# Patient Record
Sex: FEMALE | Race: WHITE | Employment: OTHER | URBAN - METROPOLITAN AREA
[De-identification: names, ages, dates, MRNs, and addresses within clinical notes are randomized per-mention and may not be internally consistent; named-entity substitution may affect disease eponyms.]

---

## 2017-11-08 PROBLEM — M25.511 CHRONIC RIGHT SHOULDER PAIN: Status: ACTIVE | Noted: 2017-11-08

## 2017-11-08 PROBLEM — R00.1 BRADYCARDIA: Status: ACTIVE | Noted: 2017-11-08

## 2017-11-08 PROBLEM — Z86.69 HISTORY OF MIGRAINE HEADACHES: Status: ACTIVE | Noted: 2017-11-08

## 2017-11-08 PROBLEM — Z12.11 COLON CANCER SCREENING: Status: ACTIVE | Noted: 2017-11-08

## 2017-11-08 PROBLEM — Z12.39 BREAST CANCER SCREENING, HIGH RISK PATIENT: Status: ACTIVE | Noted: 2017-11-08

## 2017-11-08 PROBLEM — E78.2 MIXED HYPERLIPIDEMIA: Status: ACTIVE | Noted: 2017-11-08

## 2017-11-08 PROBLEM — G89.29 CHRONIC RIGHT SHOULDER PAIN: Status: ACTIVE | Noted: 2017-11-08

## 2018-01-04 ENCOUNTER — HOSPITAL ENCOUNTER (OUTPATIENT)
Dept: MAMMOGRAPHY | Age: 63
Discharge: HOME | End: 2018-01-04
Attending: INTERNAL MEDICINE
Payer: COMMERCIAL

## 2018-01-04 DIAGNOSIS — R92.8 ABNORMAL MAMMOGRAM OF RIGHT BREAST: ICD-10-CM

## 2018-01-04 PROCEDURE — 77065 DX MAMMO INCL CAD UNI: CPT

## 2018-01-23 ENCOUNTER — HOSPITAL ENCOUNTER (OUTPATIENT)
Dept: LAB | Age: 63
Discharge: HOME | End: 2018-01-23

## 2018-01-23 PROCEDURE — 88305 TISSUE EXAM BY PATHOLOGIST: CPT | Performed by: INTERNAL MEDICINE

## 2018-01-23 PROCEDURE — 88312 SPECIAL STAINS GROUP 1: CPT | Performed by: INTERNAL MEDICINE

## 2018-03-21 PROBLEM — G43.009 MIGRAINE WITHOUT AURA OR STATUS MIGRAINOSUS: Status: ACTIVE | Noted: 2018-03-21

## 2018-03-21 PROBLEM — H61.21 IMPACTED CERUMEN OF RIGHT EAR: Status: ACTIVE | Noted: 2018-03-21

## 2018-03-21 PROBLEM — M25.50 ARTHRALGIA: Status: ACTIVE | Noted: 2018-03-21

## 2018-03-21 PROBLEM — R42 VERTIGO: Status: ACTIVE | Noted: 2018-03-21

## 2018-03-21 PROBLEM — M85.89 OSTEOPENIA OF MULTIPLE SITES: Status: ACTIVE | Noted: 2018-03-21

## 2018-03-21 PROBLEM — R51.9 HEADACHE: Status: ACTIVE | Noted: 2018-03-21

## 2018-06-04 ENCOUNTER — HOSPITAL ENCOUNTER (OUTPATIENT)
Dept: MAMMOGRAPHY | Age: 63
Discharge: HOME | End: 2018-06-04
Attending: INTERNAL MEDICINE
Payer: COMMERCIAL

## 2018-06-04 DIAGNOSIS — N63.10 MASS OF RIGHT BREAST: ICD-10-CM

## 2018-06-04 DIAGNOSIS — R92.8 ABNORMAL MAMMOGRAM: ICD-10-CM

## 2018-06-04 PROCEDURE — 77066 DX MAMMO INCL CAD BI: CPT

## 2018-06-04 PROCEDURE — 76642 ULTRASOUND BREAST LIMITED: CPT

## 2018-06-15 PROBLEM — F41.9 ANXIETY AND DEPRESSION: Status: ACTIVE | Noted: 2018-06-15

## 2018-06-15 PROBLEM — F32.A ANXIETY AND DEPRESSION: Status: ACTIVE | Noted: 2018-06-15

## 2018-09-25 PROBLEM — H91.92 DECREASED HEARING OF LEFT EAR: Status: ACTIVE | Noted: 2018-09-25

## 2018-09-25 PROBLEM — H61.22 IMPACTED CERUMEN OF LEFT EAR: Status: ACTIVE | Noted: 2018-09-25

## 2018-09-25 PROBLEM — R09.81 SINUS CONGESTION: Status: ACTIVE | Noted: 2018-09-25

## 2018-11-14 ENCOUNTER — HOSPITAL ENCOUNTER (OUTPATIENT)
Dept: CT IMAGING | Age: 63
Discharge: HOME | End: 2018-11-14
Payer: COMMERCIAL

## 2018-11-14 DIAGNOSIS — R10.84 ABDOMINAL PAIN, GENERALIZED: ICD-10-CM

## 2018-11-14 DIAGNOSIS — K21.9 ESOPHAGEAL REFLUX: ICD-10-CM

## 2018-11-14 DIAGNOSIS — K58.0 IRRITABLE BOWEL SYNDROME WITH DIARRHEA: ICD-10-CM

## 2018-11-14 LAB — CREAT BLD-MCNC: 0.6 MG/DL (ref 0.8–1.5)

## 2018-11-14 PROCEDURE — 74177 CT ABD & PELVIS W/CONTRAST: CPT

## 2018-11-14 PROCEDURE — 74011000258 HC RX REV CODE- 258

## 2018-11-14 PROCEDURE — 82565 ASSAY OF CREATININE: CPT

## 2018-11-14 PROCEDURE — 74011636320 HC RX REV CODE- 636/320

## 2018-11-14 RX ORDER — SODIUM CHLORIDE 0.9 % (FLUSH) 0.9 %
10 SYRINGE (ML) INJECTION
Status: COMPLETED | OUTPATIENT
Start: 2018-11-14 | End: 2018-11-14

## 2018-11-14 RX ADMIN — DIATRIZOATE MEGLUMINE AND DIATRIZOATE SODIUM 15 ML: 660; 100 LIQUID ORAL; RECTAL at 10:40

## 2018-11-14 RX ADMIN — IOPAMIDOL 100 ML: 755 INJECTION, SOLUTION INTRAVENOUS at 10:40

## 2018-11-14 RX ADMIN — Medication 10 ML: at 10:40

## 2018-11-14 RX ADMIN — SODIUM CHLORIDE 100 ML: 900 INJECTION, SOLUTION INTRAVENOUS at 10:40

## 2018-11-28 PROBLEM — G47.9 SLEEP DISTURBANCE: Status: ACTIVE | Noted: 2018-11-28

## 2019-03-22 PROBLEM — R32 URINARY INCONTINENCE: Status: ACTIVE | Noted: 2019-03-22

## 2019-08-02 ENCOUNTER — HOSPITAL ENCOUNTER (OUTPATIENT)
Dept: MAMMOGRAPHY | Age: 64
Discharge: HOME | End: 2019-08-02
Attending: INTERNAL MEDICINE
Payer: MEDICARE

## 2019-08-02 DIAGNOSIS — Z12.31 VISIT FOR SCREENING MAMMOGRAM: ICD-10-CM

## 2019-08-02 PROCEDURE — 77063 BREAST TOMOSYNTHESIS BI: CPT

## 2019-12-03 PROBLEM — Z00.00 WELCOME TO MEDICARE PREVENTIVE VISIT: Status: ACTIVE | Noted: 2019-12-03

## 2020-09-08 PROBLEM — R00.1 BRADYCARDIA: Status: RESOLVED | Noted: 2017-11-08 | Resolved: 2020-09-08

## 2020-09-08 PROBLEM — Z12.39 BREAST CANCER SCREENING, HIGH RISK PATIENT: Status: RESOLVED | Noted: 2017-11-08 | Resolved: 2020-09-08

## 2020-09-08 PROBLEM — G47.00 INSOMNIA: Status: ACTIVE | Noted: 2020-09-08

## 2020-09-08 PROBLEM — J30.1 SEASONAL ALLERGIC RHINITIS DUE TO POLLEN: Status: ACTIVE | Noted: 2018-09-25

## 2020-09-08 PROBLEM — H61.21 IMPACTED CERUMEN OF RIGHT EAR: Status: RESOLVED | Noted: 2018-03-21 | Resolved: 2020-09-08

## 2020-09-08 PROBLEM — Z00.00 WELCOME TO MEDICARE PREVENTIVE VISIT: Status: RESOLVED | Noted: 2019-12-03 | Resolved: 2020-09-08

## 2020-09-08 PROBLEM — Z86.69 HISTORY OF MIGRAINE HEADACHES: Status: RESOLVED | Noted: 2017-11-08 | Resolved: 2020-09-08

## 2020-09-08 PROBLEM — Z78.0 MENOPAUSE: Status: ACTIVE | Noted: 2020-09-08

## 2020-09-08 PROBLEM — M25.50 ARTHRALGIA: Status: RESOLVED | Noted: 2018-03-21 | Resolved: 2020-09-08

## 2020-09-08 PROBLEM — G47.9 SLEEP DISTURBANCE: Status: RESOLVED | Noted: 2018-11-28 | Resolved: 2020-09-08

## 2020-09-08 PROBLEM — H61.22 IMPACTED CERUMEN OF LEFT EAR: Status: RESOLVED | Noted: 2018-09-25 | Resolved: 2020-09-08

## 2020-09-08 PROBLEM — H91.92 DECREASED HEARING OF LEFT EAR: Status: RESOLVED | Noted: 2018-09-25 | Resolved: 2020-09-08

## 2020-09-08 PROBLEM — Z12.11 COLON CANCER SCREENING: Status: RESOLVED | Noted: 2017-11-08 | Resolved: 2020-09-08

## 2020-09-08 PROBLEM — R51.9 HEADACHE: Status: RESOLVED | Noted: 2018-03-21 | Resolved: 2020-09-08

## 2020-09-08 PROBLEM — I10 ESSENTIAL HYPERTENSION: Status: ACTIVE | Noted: 2020-09-08

## 2020-10-12 ENCOUNTER — HOSPITAL ENCOUNTER (OUTPATIENT)
Dept: MAMMOGRAPHY | Age: 65
Discharge: HOME | End: 2020-10-12
Attending: INTERNAL MEDICINE
Payer: MEDICARE

## 2020-10-12 DIAGNOSIS — N95.1 POSTMENOPAUSAL DISORDER: ICD-10-CM

## 2020-10-12 DIAGNOSIS — Z12.31 VISIT FOR SCREENING MAMMOGRAM: ICD-10-CM

## 2020-10-12 DIAGNOSIS — M85.89 OSTEOPENIA OF MULTIPLE SITES: ICD-10-CM

## 2020-10-12 PROCEDURE — 77063 BREAST TOMOSYNTHESIS BI: CPT

## 2020-10-12 PROCEDURE — 77080 DXA BONE DENSITY AXIAL: CPT

## 2020-10-13 NOTE — PROGRESS NOTES
This has been fully explained to the patient, who indicates understanding that per Ollie Chu noted mammo stable, recheck in 1 year

## 2020-10-13 NOTE — PROGRESS NOTES
This has been fully explained to the patient, who indicates understanding that per Dr. Sarabjit Aquino noted mammo stable, recheck in 1 year

## 2021-05-17 ENCOUNTER — HOSPITAL ENCOUNTER (OUTPATIENT)
Dept: CT IMAGING | Age: 66
Discharge: HOME | End: 2021-05-17
Attending: INTERNAL MEDICINE
Payer: MEDICARE

## 2021-05-17 DIAGNOSIS — G44.89 OTHER HEADACHE SYNDROME: ICD-10-CM

## 2021-05-17 DIAGNOSIS — R41.3 MEMORY CHANGES: ICD-10-CM

## 2021-05-17 PROCEDURE — 70450 CT HEAD/BRAIN W/O DYE: CPT

## 2021-05-20 NOTE — PROGRESS NOTES
LMOVM informing pt, per Dr. Tomás Zuleta, CT head negative for mass, hematoma, or acute abnormality.

## 2021-10-22 ENCOUNTER — HOSPITAL ENCOUNTER (OUTPATIENT)
Dept: GENERAL RADIOLOGY | Age: 66
Discharge: HOME | End: 2021-10-22
Payer: MEDICARE

## 2021-10-22 DIAGNOSIS — M54.31 SCIATICA, RIGHT SIDE: ICD-10-CM

## 2021-10-22 DIAGNOSIS — M25.551 ACUTE HIP PAIN, RIGHT: ICD-10-CM

## 2021-10-22 PROCEDURE — 73502 X-RAY EXAM HIP UNI 2-3 VIEWS: CPT

## 2021-10-22 PROCEDURE — 72100 X-RAY EXAM L-S SPINE 2/3 VWS: CPT

## 2021-10-22 NOTE — PROGRESS NOTES
Please let Troy Mercado know that her xrays are normal. If her symptoms persist please have her schedule a follow up in the office for further evaluation.

## 2021-10-22 NOTE — PROGRESS NOTES
Pt notified that her xrays are normal. If her symptoms persist please have her schedule a follow up in the office for further evaluation. Pt verbalized understanding.

## 2021-11-04 ENCOUNTER — HOSPITAL ENCOUNTER (OUTPATIENT)
Dept: CT IMAGING | Age: 66
Discharge: HOME | End: 2021-11-04
Attending: PHYSICIAN ASSISTANT
Payer: MEDICARE

## 2021-11-04 DIAGNOSIS — R10.30 LOWER ABDOMINAL PAIN: ICD-10-CM

## 2021-11-04 DIAGNOSIS — Z91.041 ALLERGY TO IODINATED CONTRAST MEDIA: ICD-10-CM

## 2021-11-04 PROCEDURE — 74176 CT ABD & PELVIS W/O CONTRAST: CPT

## 2021-11-04 PROCEDURE — 74011000250 HC RX REV CODE- 250: Performed by: PHYSICIAN ASSISTANT

## 2021-11-04 RX ORDER — BARIUM SULFATE 20 MG/ML
450 SUSPENSION ORAL
Status: COMPLETED | OUTPATIENT
Start: 2021-11-04 | End: 2021-11-04

## 2021-11-04 RX ADMIN — BARIUM SULFATE 450 ML: 20 SUSPENSION ORAL at 10:23

## 2021-11-04 NOTE — PROGRESS NOTES
Pt notified to temporarily discontinue calcium supplements while taking antibiotic to avoid drug interactions that can reduce efficacy of antibiotic. She may resume them once she is done with antibiotics. Pt verbalized understanding.

## 2021-11-04 NOTE — PROGRESS NOTES
Addended by: NIDIA OLMEDO on: 7/29/2019 03:40 PM     Modules accepted: Orders     Pt notified that her scan is suggestive of some inflammation in her colon. Saadia Duy would like her to take Cipro x 10 days and see if her symptoms resolve. Pt verbalized understanding and is scheduled for a follow-up.

## 2021-11-04 NOTE — PROGRESS NOTES
Her scan is suggestive of some inflammation in her colon. I would like her to take Cipro x 10 days and see if her symptoms resolve. I will send in the antibiotic. Please schedule her a follow-up appt in about 4 weeks.

## 2021-11-04 NOTE — PROGRESS NOTES
Prescription sent but please advise patient that she will need to temporarily discontinue calcium supplements while taking antibiotic to avoid drug interactions that can reduce efficacy of antibiotic. She may resume them once she is done with antibiotics.

## 2021-11-04 NOTE — PROGRESS NOTES
Correction - follow-up in 3 weeks. Also need to know which local pharmacy she wants antibiotic sent to. She has 2 in her chart.

## 2021-12-15 ENCOUNTER — TRANSCRIBE ORDER (OUTPATIENT)
Dept: SCHEDULING | Age: 66
End: 2021-12-15

## 2021-12-15 DIAGNOSIS — Z12.31 VISIT FOR SCREENING MAMMOGRAM: Primary | ICD-10-CM

## 2022-02-14 ENCOUNTER — HOSPITAL ENCOUNTER (OUTPATIENT)
Dept: MAMMOGRAPHY | Age: 67
Discharge: HOME | End: 2022-02-14
Attending: INTERNAL MEDICINE
Payer: MEDICARE

## 2022-02-14 DIAGNOSIS — Z12.31 VISIT FOR SCREENING MAMMOGRAM: ICD-10-CM

## 2022-02-14 PROCEDURE — 77063 BREAST TOMOSYNTHESIS BI: CPT

## 2022-03-18 PROBLEM — R32 URINARY INCONTINENCE: Status: ACTIVE | Noted: 2019-03-22

## 2022-03-18 PROBLEM — I10 ESSENTIAL HYPERTENSION: Status: ACTIVE | Noted: 2020-09-08

## 2022-03-18 PROBLEM — Z78.0 MENOPAUSE: Status: ACTIVE | Noted: 2020-09-08

## 2022-03-18 PROBLEM — G47.00 INSOMNIA: Status: ACTIVE | Noted: 2020-09-08

## 2022-03-19 PROBLEM — M25.511 CHRONIC RIGHT SHOULDER PAIN: Status: ACTIVE | Noted: 2017-11-08

## 2022-03-19 PROBLEM — F41.9 ANXIETY AND DEPRESSION: Status: ACTIVE | Noted: 2018-06-15

## 2022-03-19 PROBLEM — J30.1 SEASONAL ALLERGIC RHINITIS DUE TO POLLEN: Status: ACTIVE | Noted: 2018-09-25

## 2022-03-19 PROBLEM — R42 VERTIGO: Status: ACTIVE | Noted: 2018-03-21

## 2022-03-19 PROBLEM — G43.009 MIGRAINE WITHOUT AURA OR STATUS MIGRAINOSUS: Status: ACTIVE | Noted: 2018-03-21

## 2022-03-19 PROBLEM — M85.89 OSTEOPENIA OF MULTIPLE SITES: Status: ACTIVE | Noted: 2018-03-21

## 2022-03-19 PROBLEM — G89.29 CHRONIC RIGHT SHOULDER PAIN: Status: ACTIVE | Noted: 2017-11-08

## 2022-03-19 PROBLEM — F32.A ANXIETY AND DEPRESSION: Status: ACTIVE | Noted: 2018-06-15

## 2022-03-20 PROBLEM — E78.2 MIXED HYPERLIPIDEMIA: Status: ACTIVE | Noted: 2017-11-08

## 2022-03-31 ENCOUNTER — HOSPITAL ENCOUNTER (OUTPATIENT)
Dept: PHYSICAL THERAPY | Age: 67
Discharge: HOME | End: 2022-03-31
Payer: MEDICARE

## 2022-03-31 DIAGNOSIS — M54.41 ACUTE MIDLINE LOW BACK PAIN WITH RIGHT-SIDED SCIATICA: ICD-10-CM

## 2022-03-31 PROCEDURE — 97162 PT EVAL MOD COMPLEX 30 MIN: CPT

## 2022-03-31 NOTE — THERAPY EVALUATION
: 1955      Payor: Cindy Scruggs / Plan: [address] HMO / Product Type: Managed Care Medicare /  [id number] [facility] [address]. [facility] [address], [facility], [id number] [place] [address]  [phone]   [phone]       OUTPATIENT PHYSICAL THERAPY:Initial Assessment 3/31/2022      ICD-10: Treatment Diagnosis:   Low back pain (M54.5)  Muscle Weakness, Generalized (M62.81)                 Precautions/Allergies:   Adhesive, Codeine, Iodinated contrast media, and Other medication   Fall Risk Score: 0 (? 5 = High Risk)  MD Orders: Eval and Treat  MEDICAL/REFERRING DIAGNOSIS:  Acute midline low back pain with right-sided sciatica [M54.41]   DATE OF ONSET: one month  REFERRING PHYSICIAN: Edith Castillo NP  RETURN PHYSICIAN APPOINTMENT: TBD     INITIAL ASSESSMENT:  Ms. Yanet Goodwin presents to physical therapy with decreased postural and hip/core strength, ROM, joint mobility, flexibility, functional mobility, and increased pain. No pelvic malalignment upon initial evaluation but decreased posture. These S/S are consistent with referring diagnosis. Yanet Goodwin will benefit from skilled physical therapy (medically necessary) to address above deficits affecting participation in basic ADLs and functional mobility/tolerance. Patient will benefit from manual therapeutic techniques (stretching, joint mobilizations, soft tissue mobilization/myofascial release), therapeutic exercises and activities, postural strengthening/education, and comprehensive home exercises program to address current impairments and functional limitations. PROBLEM LIST (Impacting functional limitations):  1. Decreased Strength  2. Decreased ADL/Functional Activities  3. Decreased Transfer Abilities  4. Decreased Ambulation Ability/Technique  5. Decreased Balance  6. Increased Pain  7. Decreased Activity Tolerance  8. Decreased Flexibility/Joint Mobility  9.  Decreased [place] with Home Exercise Program INTERVENTIONS PLANNED:    1. Family Education  2. Home Exercise Program (HEP)  3. Manual Therapy  4. Range of Motion (ROM)  5. Therapeutic Activites  6. Therapeutic Exercise/Strengthening     TREATMENT PLAN:  Effective Dates: 3/31/2022 TO 5/30/2022 (60 days). Frequency/Duration: 2 times a week for 60 Days  GOALS: (Goals have been discussed and agreed upon with patient.)     Short-Term Goals~4-6 weeks  Goal Met   1. Ava Castellon will be independent with HEP 1.  [] Date:   2. Ava Castellon will participate in LE stretching program to increase flexibility    2. [] Date:   3. Ava Castellon will improve NEREYDA by 5 points. 3.  [] Date:   4. Ava Castellon will improve 30 sec Sit to Stand Test by 5 reps indicating increase in overall functional strength. 4.  [] Date:   5. Ava Castellon will be able to lift and carry 30 pounds without difficulty. 5.  [] Date:   6. Ava Castellon will return to [facility] for regular strength training. 6.  [] Date:                 Outcome Measure: Tool Used: Modified Oswestry Low Back Pain Questionnaire    Score:  Initial: 13/50  Most Recent: X/50 (Date: -- )   Interpretation of Score: Each section is scored on a 0-5 scale, 5 representing the greatest disability. The scores of each section are added together for a total score of 50. Medical Necessity:   · Skilled intervention continues to be required due to above deficits affecting participation in basic ADLs and overall functional tolerance. Reason for Services/Other Comments:  · Patient continues to require skilled intervention due to  above deficits affecting participation in basic ADLs and overall functional tolerance.     Total Treatment Duration:  PT Patient Time In/Time Out  Time In: 0900  Time Out: 0945    Rehabilitation Potential For Stated Goals: GOOD  Regarding Vera 6 therapy, I certify that the treatment plan above will be carried out by a therapist or under their direction. Thank you for this referral,  Madina Perry, PT     Referring Physician Signature: Cheryl Ramirez, KATIE _______________________________ Date _____________            HISTORY:   History of Present Injury/Illness (Reason for Referral): Two month history of low back pain with radiation into right thigh. States that she had been doing a lot of painting in her house. Pain not affected by time of day. Was doing some yard work until a month ago. -Present symptoms/complaints (on day of evaluation) bilateral hip and back pain  Pain Scale:  · Current: 4/10  · Best: 2/10  · Worst: 8/10      · Aggravating factors: bending, Lifting and twisting motions right leg can get pain in groin   · Relieving factors: Rest, Heat and Tens  · Irritability: Medium (Onset of pain is equal to alleviation)      Past Medical History/Comorbidities:   Ms. Eugenia Pereira  has a past medical history of Ear problems, GERD (gastroesophageal reflux disease) (2006), Hearing reduced, History of mammogram (08/2019), Hyperlipidemia (2016), Migraine headache, Osteoarthritis (1995), and Sinus problem. Ms. Eugenia Pereira  has a past surgical history that includes hx shoulder arthroscopy (Right, 02/2017); hx orthopaedic (Right, 03/2016); hx orthopaedic (Left, 10/2015); hx tonsillectomy (1959); hx bunionectomy (Left, 2004); hx total laparoscopic hysterectomy (2005); hx other surgical (1991); hx breast biopsy (Right); and hx colonoscopy (2017). Social History/Living Environment:    Walks a mile a day, has not done any strength training in 4 months. Used to go to [facility].     Social History     Socioeconomic History    Marital status:      Spouse name: Not on file    Number of children: 1    Years of education: Not on file    Highest education level: Not on file   Occupational History    Occupation: retired, laser optic technician   Tobacco Use    Smoking status: Former Smoker     Years: 22.00     Types: Cigarettes     Quit date: 1/1/1991 Years since quittin.2    Smokeless tobacco: Never Used   Vaping Use    Vaping Use: Never used   Substance and Sexual Activity    Alcohol use: Yes     Alcohol/week: 7.0 - 8.0 standard drinks     Types: 7 - 8 Glasses of wine per week    Drug use: No    Sexual activity: Yes   Other Topics Concern    Not on file   Social History Narrative    Not on file     Social Determinants of Health     Financial Resource Strain:     Difficulty of Paying Living Expenses: Not on file   Food Insecurity:     Worried About Running Out of Food in the Last Year: Not on file    Soo of Food in the Last Year: Not on file   Transportation Needs:     Lack of Transportation (Medical): Not on file    Lack of Transportation (Non-Medical):  Not on file   Physical Activity:     Days of Exercise per Week: Not on file    Minutes of Exercise per Session: Not on file   Stress:     Feeling of Stress : Not on file   Social Connections:     Frequency of Communication with Friends and Family: Not on file    Frequency of Social Gatherings with Friends and Family: Not on file    Attends Oriental orthodox Services: Not on file    Active Member of [address] [place] [address] or Organizations: Not on file    Attends Club or Organization Meetings: Not on file    Marital Status: Not on file   Intimate Partner Violence:     Fear of Current or Ex-Partner: Not on file    Emotionally Abused: Not on file    Physically Abused: Not on file    Sexually Abused: Not on file   Housing Stability:     Unable to Pay for Housing in the Last Year: Not on file    Number of [place] in the Last Year: Not on file    Unstable Housing in the Last Year: Not on file     Prior Level of Function/Work/Activity:  Unrestricted  Previous Treatment Approach  self treat      Active Ambulatory Problems     Diagnosis Date Noted    GERD (gastroesophageal reflux disease) 2006    Osteoarthritis 1995    Mixed hyperlipidemia 2017    Chronic right shoulder pain 11/08/2017    Vertigo 03/21/2018    Migraine without aura or status migrainosus 03/21/2018    Osteopenia of multiple sites 03/21/2018    Anxiety and depression 06/15/2018    Seasonal allergic rhinitis due to pollen 09/25/2018    Urinary incontinence 03/22/2019    Essential hypertension 09/08/2020    Menopause 09/08/2020    Insomnia 09/08/2020     Resolved Ambulatory Problems     Diagnosis Date Noted    Bradycardia 11/08/2017    Breast cancer screening, high risk patient 11/08/2017    Colon cancer screening 11/08/2017    History of migraine headaches 11/08/2017    Headache 03/21/2018    Impacted cerumen of right ear 03/21/2018    Arthralgia 03/21/2018    Impacted cerumen of left ear 09/25/2018    Decreased hearing of left ear 09/25/2018    Sleep disturbance 11/28/2018    Welcome to Medicare preventive visit 12/03/2019     Past Medical History:   Diagnosis Date    Ear problems     Hearing reduced     History of mammogram 08/2019    Hyperlipidemia 2016    Migraine headache     Sinus problem      Note: Patient denies any increase of symptoms with cough, sneeze or valsalva. Patient denies any saddle paresthesia or bowel/bladder deficits. Current Medications:    Current Outpatient Medications:     omeprazole (PRILOSEC) 20 mg capsule, Take 1 Capsule by mouth daily. , Disp: 90 Capsule, Rfl: 1    naproxen (NAPROSYN) 500 mg tablet, Take 1 Tablet by mouth two (2) times daily (with meals). , Disp: 30 Tablet, Rfl: 1    rizatriptan (MAXALT-MLT) 5 mg rapid dissolve tablet, , Disp: , Rfl:     traZODone (DESYREL) 50 mg tablet, TAKE 1 TABLET BY MOUTH EVERY DAY AT NIGHT, Disp: 90 Tablet, Rfl: 3    estradioL (ESTRACE) 0.01 % (0.1 mg/gram) vaginal cream, INSERT 2 G INTO VAGINA DAILY. , Disp: 42.5 g, Rfl: 0    atenoloL (TENORMIN) 50 mg tablet, Take 1 Tablet by mouth daily. , Disp: 90 Tablet, Rfl: 1    polyethylene glycol (Miralax) 17 gram/dose powder, Take 17 g by mouth daily. , Disp: , Rfl:     escitalopram oxalate (LEXAPRO) 20 mg tablet, TAKE 1 TABLET EVERY DAY, Disp: 90 Tablet, Rfl: 1    busPIRone (BUSPAR) 5 mg tablet, Take 1 Tab by mouth two (2) times daily as needed (anxiety). , Disp: 60 Tab, Rfl: 0    valACYclovir (Valtrex) 1 gram tablet, Take 1 Tab by mouth two (2) times daily as needed for Other (HSV). , Disp: 60 Tab, Rfl: 11    Bifidobacterium infantis (ALIGN PO), Take  by mouth., Disp: , Rfl:     TURMERIC PO, Take  by mouth., Disp: , Rfl:     methylcellulose (CITRUCEL PO), Take  by mouth., Disp: , Rfl:     inulin (FIBER GUMMIES PO), Take  by mouth., Disp: , Rfl:     calcium citrate-vitamin d3 (CITRACAL+D) 315-200 mg-unit tab, Take 1 Tab by mouth daily (with breakfast). , Disp: , Rfl:     Calcium-Cholecalciferol, D3, (CALCIUM 600 WITH VITAMIN D3) 600 mg(1,500mg) -400 unit cap, Take  by mouth., Disp: , Rfl:     multivitamin (ONE A DAY) tablet, Take 1 Tab by mouth daily. , Disp: , Rfl:     fluticasone (FLONASE) 50 mcg/actuation nasal spray, 2 Sprays by Both Nostrils route daily. , Disp: , Rfl:     biotin 5,000 mcg TbDi, Take  by mouth., Disp: , Rfl:     lysine 1,000 mg tab, Take  by mouth., Disp: , Rfl:       Ambulatory/Rehab Services H2 Model Falls Risk Assessment    Risk Factors:       No Risk Factors Identified Ability to Rise from Chair:       (0)  Ability to rise in a single movement    Falls Prevention Plan:       No modifications necessary   Total: (5 or greater = High Risk): 0    ©2010 [facility] [place] [address] [place] [address] States Patent #[id number].  Federal Law prohibits the replication, distribution or use without written permission from [facility] [employer]       Date Last Reviewed:  3/31/2022   Number of Personal Factors/Comorbidities that affect the Plan of Care: 1-2: MODERATE COMPLEXITY   EXAMINATION:   Observation/Orthostatic Postural Assessment:        Normal presentattion    ROM:            AROM/PROM       RIGHT LEFT   Knee Extension  WNL  WNL   Knee Flexion  WNL  WNL Hip Flexion  WNL  WNL   Hip Abduction  WNL  WNL   Hip ER  45  60   Hip IR  10  20     Lumbar ROM     Movement Range Descriptor Degrees Notes   Flexion Hands to Floor Pain with Return  Central LB   Extension Shoulder > Midline Unremarkable         Repeated Motion:  Direction    Frequency Symptoms Prior Symptons Post   Flexion Repeated 10 times  Increased Symptoms   Extension Sustained Standing  Unremarkable         Strength:      RIGHT LEFT   Knee Flexion (L5-S2)  5/5  5/5   Knee Extension (L3, L4)  4+/5  4+/5   Hip Flexion (L1, L2)  4-/5  4-/5   Hip Extension  4-/5  4-/5   Hip Abduction (L5, S1)  4-/5  4-/5   Ankle Dorsiflexion (L4)  5/5  5/5   Great Toe Extension (L5)  5/5  5/5   Ankle Plantar Flexion (S1-S2)  4+/5  4+/5       Special Tests:  Lumbar:  SLR: Negative  SLUMP: Negative   SI Joint:  Not Tested   Hip:  FADIRs:Positive for pain on right         Neurological Screen:    RADIATING SYMPTOMS: Yes groin pain right > left intermittent radiation to knee      Functional Mobility:  Affecting participation in basic ADLs and functional tasks. Squat limited by hip pain right to 100 knee flexion. Balance and Mobility:    Test Result   Timed up and Go 5.58 Seconds   30 second Sit to Stand 10   6 Minute Walk Test    Single Leg Balance Right: <30 seconds     Left:<30 seconds          Body Structures Involved:  1. Bones  2. Joints  3. Muscles   Body Functions Affected:  1. Sensory/Pain  2. Neuromusculoskeletal  3. Movement Related Activities and Participation Affected:  1.  Mobility  2.   lifting   Number of elements that affect the Plan of Care: 3: MODERATE COMPLEXITY   CLINICAL PRESENTATION:   Presentation: Evolving clinical presentation with changing clinical characteristics: MODERATE COMPLEXITY   CLINICAL DECISION MAKING:      Use of outcome tool(s) and clinical judgement create a POC that gives a: Questionable prediction of patient's progress: MODERATE COMPLEXITY     See associated treatment note for treatment provided today      Future Appointments   Date Time Provider Edmund Arriaza   4/8/2022 12:00 PM Johnnie Torres, CHRISTIAN [facility]   1/4/2023  7:45 AM MAT LAB [facility]   1/11/2023 11:00 AM Emily Hopper MD [facility]         Johnnie Torres, CHRISTIAN     remember to save as eval note

## 2022-04-08 ENCOUNTER — HOSPITAL ENCOUNTER (OUTPATIENT)
Dept: PHYSICAL THERAPY | Age: 67
Discharge: HOME | End: 2022-04-08
Payer: MEDICARE

## 2022-04-08 PROCEDURE — 97110 THERAPEUTIC EXERCISES: CPT

## 2022-04-08 NOTE — PROGRESS NOTES
Whitney Prajapati  : 1955  Payor: Sonia Kaufman / Plan: [address] HMO / Product Type: Managed Care Medicare /  Jess Apley at [address] [place]. [address] [place] [address], [facility], [address] [place] [address] Road  [phone]   [phone]                                                          Trell Raymond NP      OUTPATIENT PHYSICAL THERAPY: Daily Treatment Note 2022 Visit Count:  2    Tx Diagnosis:  *Low back pain (M54.5)  Muscle Weakness, Generalized (M62.81)**      Pre-treatment Symptoms/Complaints: bent over to move table strained back yesterday   Pain: Initial:3/10  Medications Last Reviewed:  2022     Post Session: 2/10   Updated Objective Findings:  lumbar flexion to toes, extension scapula past mid line. Right hip external rotation restrictions. Central back pain with extension        TREATMENT:   THERAPEUTIC EXERCISE: (33 minutes):  Exercises per grid below to improve mobility, strength and balance. Required minimal visual, verbal and manual cues to promote proper body alignment and promote proper body posture. Progressed resistance and complexity of movement as indicated. Date:  2022 Date:   Date:     Activity/Exercise Parameters Parameters Parameters   Education HEP, POC, PT goals, anatomy/pathology     L stretch 10 x     Cat camel  10 x     Hip hinge quadruped 10 x     LTR 10 x ea     Open book 10 x ea     Figure 4 stretch 1 min ea     Standing hip flexion with band 10 x ea     Resisted sidewalking 30' yellow heavy                 THERAPEUTIC ACTIVITY: ( 0 minutes): Activities per gid below to improve functional movement related mobility, strength and balance to improve neuro-muscular carryover to daily functional activities for improving patient's quality of life. Required visual, verbal and manual cues to promote proper body alignment and promote proper body posture/mechanics. Progressed resistance and complexity of movement as indicated. Date:  4/8/2022 Date:   Date:     Activity/Exercise Parameters Parameters Parameters                                                                               MANUAL THERAPY: (0 minutes): Joint mobilization, Soft tissue mobilization was utilized and necessary because of the patient's restricted joint motion and restricted motion of soft tissue mobility. Date  4/8/2022    Technique Used Grade  Level # Time(s) Effect while being performed                                                                 HEP Log Date 1. See treatment flowsheet  4/8/2022   2.  4/8/2022   3. 4/8/2022   4.    5.           [employer] [facility]  Treatment/Session Summary:    Response to Treatment: Pt demonstrated understanding of POC and initial HEP. No increase in pain or adverse reactions. Communication/Consultation:  POC, HEP, PT goals, Faxed initial evaluation to MD.   Equipment provided today: HEP Handout   Recommendations/Intent for next treatment session:   Next visit will focus on back mobility, functional strengthening. Treatment Plan of Care Effective Dates: 3/31/2022 TO 5/302022 (60 days).   Frequency/Duration: 2 times a week for 60 Days             Total Treatment Billable Duration:   33  Rx  PT Patient Time In/Time Out  Time In: 1211  Time Out: Deuce 258, PT    Future Appointments   Date Time Provider Edmund Arriaza   1/4/2023  7:45 AM MAT LAB [facility]   1/11/2023 11:00 AM Yu Acharya MD [facility]

## 2022-04-12 ENCOUNTER — HOSPITAL ENCOUNTER (OUTPATIENT)
Dept: PHYSICAL THERAPY | Age: 67
Discharge: HOME | End: 2022-04-12
Payer: MEDICARE

## 2022-04-12 PROCEDURE — 97110 THERAPEUTIC EXERCISES: CPT

## 2022-04-12 NOTE — PROGRESS NOTES
Gina Channel  : 1955  Payor: Simeon Geigernghia / Plan: [address] HMO / Product Type: Managed Care Medicare /  [address] Demetrice Inocente, [address] [facility], [facility], [address] [place] [address] Road  [phone]   [phone]                                                          Ritika Zacarias NP      OUTPATIENT PHYSICAL THERAPY: Daily Treatment Note 2022 Visit Count:  3    Tx Diagnosis:  *Low back pain (M54.5)  Muscle Weakness, Generalized (M62.81)**      Pre-treatment Symptoms/Complaints: bent over to move table strained back yesterday   Pain: Initial:3/10  Medications Last Reviewed:  2022     Post Session: 2/10   Updated Objective Findings:  lumbar flexion to toes, extension scapula past mid line. Right hip external rotation restrictions. TREATMENT:   THERAPEUTIC EXERCISE: (38 minutes):  Exercises per grid below to improve mobility, strength and balance. Required minimal visual, verbal and manual cues to promote proper body alignment and promote proper body posture. Progressed resistance and complexity of movement as indicated. Date:  2022 Date:  22 Date:     Activity/Exercise Parameters Parameters Parameters   Education HEP, POC, PT goals, anatomy/pathology Post ex recovery walking    L stretch 10 x 10 x    Cat camel  10 x 10 x  Pre and post work out    Hip hinge quadruped 10 x 10 x    LTR 10 x ea     Open book 10 x ea     Figure 4 stretch 1 min ea     Standing hip flexion with band 10 x ea 2 x 10 yellow    Resisted sidewalking 30' yellow heavy 50' purple band    Bird dog  10 x ea side    Hip hinge with pole  10 x     Box squat with overhead reach   2 x10    RDL   7# 2 x 10                THERAPEUTIC ACTIVITY: ( 0 minutes): Activities per gid below to improve functional movement related mobility, strength and balance to improve neuro-muscular carryover to daily functional activities for improving patient's quality of life. Required visual, verbal and manual cues to promote proper body alignment and promote proper body posture/mechanics. Progressed resistance and complexity of movement as indicated. Date:  4/12/2022 Date:   Date:     Activity/Exercise Parameters Parameters Parameters                                                                               MANUAL THERAPY: (0 minutes): Joint mobilization, Soft tissue mobilization was utilized and necessary because of the patient's restricted joint motion and restricted motion of soft tissue mobility. Date  4/12/2022    Technique Used Grade  Level # Time(s) Effect while being performed                                                                 HEP Log Date 1. See treatment flowsheet  4/8/2022   2.  4/12/2022   3. 4/12/2022   4.    5.           [employer] Portal  Treatment/Session Summary:    Response to Treatment: No pain complaints   Communication/Consultation:  POC, HEP, PT goals, Faxed initial evaluation to MD.   Equipment provided today: None today   Recommendations/Intent for next treatment session:   Next visit will focus on back mobility, functional strengthening. Treatment Plan of Care Effective Dates: 3/31/2022 TO 5/302022 (60 days).   Frequency/Duration: 2 times a week for 60 Days             Total Treatment Billable Duration:   38  Rx  PT Patient Time In/Time Out  Time In: 1115  Time Out: [address] [id number], PT    Future Appointments   Date Time Provider Edmund Arriaza   4/15/2022  9:45 AM James Torres, PT [facility]   4/19/2022 11:15 AM James Torres, PT [facility]   4/21/2022 10:30 AM James Torres, PT [facility]   4/26/2022 11:15 AM James Torres, PT [facility]   4/28/2022  4:30 PM Klever Wright, PT [facility]   5/3/2022 11:15 AM James Torres, PT [facility]   5/5/2022 11:15 AM James Torres, PT [facility]   5/10/2022 11:15 AM Fede Torres, PT [facility]   5/12/2022 11:15 AM Fede Torres PT [facility]   1/4/2023  7:45 AM MAT LAB [facility]   1/11/2023 11:00 AM Linus Ellis MD [facility]

## 2022-04-15 ENCOUNTER — HOSPITAL ENCOUNTER (OUTPATIENT)
Dept: PHYSICAL THERAPY | Age: 67
Discharge: HOME | End: 2022-04-15
Payer: MEDICARE

## 2022-04-15 PROCEDURE — 97110 THERAPEUTIC EXERCISES: CPT

## 2022-04-15 NOTE — PROGRESS NOTES
Whitney Prajapati  : 1955  Payor: Sonia Kaufman / Plan: [address] HMO / Product Type: Managed Care Medicare /  Jess Apley at [address]. [address], [facility] [place], [address] [place] [address] Road  [phone]   [phone]                                                          Trell Raymond NP      OUTPATIENT PHYSICAL THERAPY: Daily Treatment Note 4/15/2022 Visit Count:  4    Tx Diagnosis:  *Low back pain (M54.5)  Muscle Weakness, Generalized (M62.81)**      Pre-treatment Symptoms/Complaints: bent over to move table strained back yesterday   Pain: Initial:3/10  Medications Last Reviewed:  4/15/2022     Post Session: 2/10   Updated Objective Findings:  lumbar flexion to toes, extension scapula past mid line. Right hip external rotation restrictions. TREATMENT:   THERAPEUTIC EXERCISE: (38 minutes):  Exercises per grid below to improve mobility, strength and balance. Required minimal visual, verbal and manual cues to promote proper body alignment and promote proper body posture. Progressed resistance and complexity of movement as indicated. Date:  2022 Date:  22 Date:  4/15/22   Activity/Exercise Parameters Parameters Parameters   Education HEP, POC, PT goals, anatomy/pathology Post ex recovery walking    L stretch 10 x 10 x 10 x   Cat camel  10 x 10 x  Pre and post work out 20 x   Hip hinge quadruped 10 x 10 x 10 x   LTR 10 x ea     Open book 10 x ea     Figure 4 stretch 1 min ea     Standing hip flexion with band 10 x ea 2 x 10 yellow 20 x    Resisted sidewalking 30' yellow heavy 50' purple band 75# purple band   Bird dog  10 x ea side 10 x ea   Hip hinge with pole  10 x  10 x    Box squat with overhead reach   2 x10 Box squat 5# 4 x 5 RPE 7   RDL   7# 2 x 10 10# 10 x  15# 2 x 5   Walking treadmill   5 min warmup         THERAPEUTIC ACTIVITY: ( 8 minutes):  Activities per gid below to improve functional movement related mobility, strength and balance to improve neuro-muscular carryover to daily functional activities for improving patient's quality of life. Required visual, verbal and manual cues to promote proper body alignment and promote proper body posture/mechanics. Progressed resistance and complexity of movement as indicated. Date:  4/15/2022 Date:   Date:     Activity/Exercise Parameters Parameters Parameters    Sled push pull   50# 180'         Benavidez carry  9#/8# 380'                                                               MANUAL THERAPY: (0 minutes): Joint mobilization, Soft tissue mobilization was utilized and necessary because of the patient's restricted joint motion and restricted motion of soft tissue mobility. Date  4/15/2022    Technique Used Grade  Level # Time(s) Effect while being performed                                                                 HEP Log Date 1. See treatment flowsheet  4/8/2022   2.  4/15/2022   3. 4/15/2022   4.    5.           [employer] Portal  Treatment/Session Summary:    Response to Treatment: No pain complaints fatigue only   Communication/Consultation:  Band ex and squats at home   Equipment provided today: None today   Recommendations/Intent for next treatment session:   Next visit will focus on back mobility, functional strengthening. Treatment Plan of Care Effective Dates: 3/31/2022 TO 5/302022 (60 days).   Frequency/Duration: 2 times a week for 60 Days             Total Treatment Billable Duration:  46    Rx  PT Patient Time In/Time Out  Time In: 1863  Time Out: 1 Mack Garsia PT    Future Appointments   Date Time Provider Edmund Arriaza   4/19/2022 11:15 AM Johnnie Torres, PT [facility]   4/21/2022 10:30 AM Johnnie Torres, PT [facility]   4/26/2022 11:15 AM Johnnie Torres, PT [facility]   4/28/2022  4:30 PM Edmundo Kawasaki, PT [facility]   5/3/2022 11:15 AM Johnnie Torres, PT [facility]   5/5/2022 11:15 AM Siomara Torres, PT [facility]   5/10/2022 11:15 AM Siomara Torres, PT [facility]   5/12/2022 11:15 AM Siomara Torres, PT [facility]   1/4/2023  7:45 AM MAT LAB [facility]   1/11/2023 11:00 AM Pippa Hall MD [facility]

## 2022-04-19 ENCOUNTER — HOSPITAL ENCOUNTER (OUTPATIENT)
Dept: PHYSICAL THERAPY | Age: 67
Discharge: HOME | End: 2022-04-19
Payer: MEDICARE

## 2022-04-19 PROCEDURE — 97530 THERAPEUTIC ACTIVITIES: CPT

## 2022-04-19 PROCEDURE — 97110 THERAPEUTIC EXERCISES: CPT

## 2022-04-19 NOTE — PROGRESS NOTES
Fany Lisapresley  : 1955  Payor: Santo Kapoor / Plan: [address] HMO / Product Type: Managed Care Medicare /  [address]. [address], [address] [facility]  [phone]   [phone]                                                          Ivette Mcclain NP      OUTPATIENT PHYSICAL THERAPY: Daily Treatment Note 2022 Visit Count:  5    Tx Diagnosis:  *Low back pain (M54.5)  Muscle Weakness, Generalized (M62.81)**      Pre-treatment Symptoms/Complaints: Mild muscle soreness after ex today   Pain: Initial:3/10  Medications Last Reviewed:  2022     Post Session: 2/10   Updated Objective Findings:  lumbar flexion to toes, extension scapula past mid line. Right hip external rotation restrictions. TREATMENT:   THERAPEUTIC EXERCISE: (23 minutes):  Exercises per grid below to improve mobility, strength and balance. Required minimal visual, verbal and manual cues to promote proper body alignment and promote proper body posture. Progressed resistance and complexity of movement as indicated. Date:  2022 Date:  22 Date:  4/15/22 Date  22   Activity/Exercise Parameters Parameters Parameters    Education HEP, POC, PT goals, anatomy/pathology Post ex recovery walking     L stretch 10 x 10 x 10 x 10 x   Cat camel  10 x 10 x  Pre and post work out 20 x 10 x   Hip hinge quadruped 10 x 10 x 10 x    LTR 10 x ea      Open book 10 x ea      Figure 4 stretch 1 min ea      Standing hip flexion with band 10 x ea 2 x 10 yellow 20 x  20 x ea   Resisted sidewalking 30' yellow heavy 50' purple band 75# purple band Purple 60'    Bird dog  10 x ea side 10 x ea    Hip hinge with pole  10 x  10 x  10 x   Box squat with overhead reach   2 x10 Box squat 5# 4 x 5 RPE 7 Paused box squat 3 x 8    RDL   7# 2 x 10 10# 10 x  15# 2 x 5    Walking treadmill   5 min warmup 5 min          THERAPEUTIC ACTIVITY: ( 20 minutes):  Activities per gid below to improve functional movement related mobility, strength and balance to improve neuro-muscular carryover to daily functional activities for improving patient's quality of life. Required visual, verbal and manual cues to promote proper body alignment and promote proper body posture/mechanics. Progressed resistance and complexity of movement as indicated. Date:  4/15/2022 Date:  4/19/22 Date:     Activity/Exercise Parameters Parameters Parameters    Sled push pull   50# 180'   75# 61'      Nick Batista carry  9#/8# 380'  15# 200'      Rack pull to RDL     20# 2 x 5   30# 3 x 5 RPE 7                                                   MANUAL THERAPY: (0 minutes): Joint mobilization, Soft tissue mobilization was utilized and necessary because of the patient's restricted joint motion and restricted motion of soft tissue mobility. Date  4/19/2022    Technique Used Grade  Level # Time(s) Effect while being performed                                                                 HEP Log Date 1. See treatment flowsheet  4/8/2022   2.  4/19/2022   3. 4/19/2022   4.    5.           [employer] Portal  Treatment/Session Summary:    Response to Treatment: Mild soreness and fatigue   Communication/Consultation:  Band ex and squats at home   Equipment provided today: None today   Recommendations/Intent for next treatment session:   Next visit will focus on back mobility, functional strengthening. Treatment Plan of Care Effective Dates: 3/31/2022 TO 5/302022 (60 days).   Frequency/Duration: 2 times a week for 60 Days             Total Treatment Billable Duration: 43    Rx  PT Patient Time In/Time Out  Time In: [address], PT    Future Appointments   Date Time Provider Edmund Arriaza   4/21/2022 10:30 AM Sabino Torres PT [facility]   4/26/2022 11:15 AM Sabino Torres, PT [facility]   4/28/2022  4:30 PM CHRISTIAN Benavidez [facility]   5/3/2022 11:15 AM Ev Torres, PT [facility]   5/5/2022 11:15 AM Ev Torres, PT [facility]   5/10/2022 11:15 AM [facility] Torres[facility]   5/12/2022 11:15 AM Ev Torres, PT [facility]   1/4/2023  7:45 AM MAT LAB [facility]   1/11/2023 11:00 AM Gaston Kim MD [facility]

## 2022-04-21 ENCOUNTER — HOSPITAL ENCOUNTER (OUTPATIENT)
Dept: PHYSICAL THERAPY | Age: 67
Discharge: HOME | End: 2022-04-21
Payer: MEDICARE

## 2022-04-21 PROCEDURE — 97110 THERAPEUTIC EXERCISES: CPT

## 2022-04-21 PROCEDURE — 97530 THERAPEUTIC ACTIVITIES: CPT

## 2022-04-21 NOTE — PROGRESS NOTES
Wagner Toro  : 1955  Payor: Ruslan Smiths / Plan: [address] HMO / Product Type: Managed Care Medicare /  [address] Lucinda Garcia, [address], [address]  [phone]   [phone]                                                          Roque Hernandez NP      OUTPATIENT PHYSICAL THERAPY: Daily Treatment Note 2022 Visit Count:  6    Tx Diagnosis:  *Low back pain (M54.5)  Muscle Weakness, Generalized (M62.81)**      Pre-treatment Symptoms/Complaints: Mild muscle soreness after ex today   Pain: Initial:3/10  Medications Last Reviewed:  2022     Post Session: 2/10   Updated Objective Findings:  lumbar flexion to toes, extension scapula past mid line. Right hip external rotation restrictions. TREATMENT:   THERAPEUTIC EXERCISE: (13 minutes):  Exercises per grid below to improve mobility, strength and balance. Required minimal visual, verbal and manual cues to promote proper body alignment and promote proper body posture. Progressed resistance and complexity of movement as indicated.      Date:  2022 Date:  22 Date:  4/15/22 Date  22 Date  22   Activity/Exercise Parameters Parameters Parameters     Education HEP, POC, PT goals, anatomy/pathology Post ex recovery walking      L stretch 10 x 10 x 10 x 10 x 10   Cat camel  10 x 10 x  Pre and post work out 20 x 10 x    Hip hinge quadruped 10 x 10 x 10 x     LTR 10 x ea       Open book 10 x ea       Figure 4 stretch 1 min ea       Standing hip flexion with band 10 x ea 2 x 10 yellow 20 x  20 x ea    Resisted sidewalking 30' yellow heavy 50' purple band 75# purple band Purple 60'     Bird dog  10 x ea side 10 x ea     Hip hinge with pole  10 x  10 x  10 x    Box squat with overhead reach   2 x10 Box squat 5# 4 x 5 RPE 7 Paused box squat 3 x 8  3 x 10 16\"   RDL   7# 2 x 10 10# 10 x  15# 2 x 5     Walking treadmill   5 min warmup 5 min  5 min THERAPEUTIC ACTIVITY: (30 minutes): Activities per gid below to improve functional movement related mobility, strength and balance to improve neuro-muscular carryover to daily functional activities for improving patient's quality of life. Required visual, verbal and manual cues to promote proper body alignment and promote proper body posture/mechanics. Progressed resistance and complexity of movement as indicated. Date:  4/15/2022 Date:  4/19/22 Date:  4/21/22   Activity/Exercise Parameters Parameters Parameters    Sled push pull   50# 180'   75# 61'      Benavidez carry  9#/8# 380'  15# 200'      Rack pull to RDL     20# 2 x 5   30# 3 x 5 RPE 7      Assisted box squat      Rings to 12\" box 2 x 10     Dead lift      20# 5 x   25# 5x   30# 5 x    Modified burbee       From floor 6 x    Split squat      2 x 5 ea use of bar for support         MANUAL THERAPY: (0 minutes): Joint mobilization, Soft tissue mobilization was utilized and necessary because of the patient's restricted joint motion and restricted motion of soft tissue mobility. Date  4/21/2022    Technique Used Grade  Level # Time(s) Effect while being performed                                                                 HEP Log Date 1. See treatment flowsheet  4/8/2022   2.  4/21/2022   3. 4/21/2022   4.    5.           [employer] Portal  Treatment/Session Summary:    Response to Treatment: Mild soreness and fatigue requires assist of rings to get up from 12\" box    Communication/Consultation:  Band ex and squats at home, modified burpee   Equipment provided today: None today   Recommendations/Intent for next treatment session:   Next visit will focus on back mobility, functional strengthening. Treatment Plan of Care Effective Dates: 3/31/2022 TO 5/302022 (60 days).   Frequency/Duration: 2 times a week for 60 Days             Total Treatment Billable Duration: 43    Rx  PT Patient Time In/Time Out  Time In: 1030  Time Out: 115 Celeste Garsia, PT    Future Appointments   Date Time Provider Edmund Sofiya   4/26/2022 11:15 AM Papenfuss, Orson Mcburney, PT [facility] AND [facility]   4/28/2022  4:30 PM Dilia Dhillon, PT [facility] AND [facility]   5/3/2022 11:15 AM Papenfuss, Orson Mcburney, PT [facility]   5/5/2022 11:15 AM Papenfuss, Orson Mcburney, PT [facility]   5/10/2022 11:15 AM Papenfuss, Orson Mcburney, PT [facility]   5/12/2022 11:15 AM Papenfuss, Orson Mcburney, PT [facility]   1/4/2023  7:45 AM MAT LAB [facility]   1/11/2023 11:00 AM aSm Schaefer MD [facility]

## 2022-04-26 ENCOUNTER — HOSPITAL ENCOUNTER (OUTPATIENT)
Dept: PHYSICAL THERAPY | Age: 67
Discharge: HOME | End: 2022-04-26
Payer: MEDICARE

## 2022-04-26 PROCEDURE — 97110 THERAPEUTIC EXERCISES: CPT

## 2022-04-26 PROCEDURE — 97530 THERAPEUTIC ACTIVITIES: CPT

## 2022-04-26 NOTE — PROGRESS NOTES
Arin Valencia  : 1955  Payor: Shira Brown / Plan: [address] HMO / Product Type: Managed Care Medicare /  [address] [place], [address] [facility], [facility], [address] [place] [address] Road  [phone]   [phone]                                                          Jonathan Walters NP      OUTPATIENT PHYSICAL THERAPY: Daily Treatment Note 2022 Visit Count:  7    Tx Diagnosis:  *Low back pain (M54.5)  Muscle Weakness, Generalized (M62.81)**      Pre-treatment Symptoms/Complaints: Mild muscle soreness after ex today. Patient goal to be able to  her grandchild. Pain: Initial:3/10  Medications Last Reviewed:  2022     Post Session: 2/10   Updated Objective Findings:  lumbar flexion to toes, extension scapula past mid line. Squat to 12\" box. Hip  dominant squat with bilateral knee valgus. TREATMENT:   THERAPEUTIC EXERCISE: (15 minutes):  Exercises per grid below to improve mobility, strength and balance. Required minimal visual, verbal and manual cues to promote proper body alignment and promote proper body posture. Progressed resistance and complexity of movement as indicated.      Date:  2022 Date:  22 Date:  4/15/22 Date  22 Date  22 Date  22   Activity/Exercise Parameters Parameters Parameters      Education HEP, POC, PT goals, anatomy/pathology Post ex recovery walking       L stretch 10 x 10 x 10 x 10 x 10 10 x   Cat camel  10 x 10 x  Pre and post work out 20 x 10 x     Hip hinge quadruped 10 x 10 x 10 x      LTR 10 x ea        Open book 10 x ea        Figure 4 stretch 1 min ea        Standing hip flexion with band 10 x ea 2 x 10 yellow 20 x  20 x ea     Resisted sidewalking 30' yellow heavy 50' purple band 75# purple band Purple 60'   60'   Bird dog  10 x ea side 10 x ea      Hip hinge with pole  10 x  10 x  10 x     Box squat with overhead reach   2 x10 Box squat 5# 4 x 5 RPE 7 Paused box squat 3 x 8 3 x 10 16\" 3 x 10 16\"    RDL   7# 2 x 10 10# 10 x  15# 2 x 5      Walking treadmill   5 min warmup 5 min  5 min 5 min          THERAPEUTIC ACTIVITY: (25 minutes): Activities per gid below to improve functional movement related mobility, strength and balance to improve neuro-muscular carryover to daily functional activities for improving patient's quality of life. Required visual, verbal and manual cues to promote proper body alignment and promote proper body posture/mechanics. Progressed resistance and complexity of movement as indicated. Date:  4/15/2022 Date:  4/19/22 Date:  4/21/22 Date  4/26/22   Activity/Exercise Parameters Parameters Parameters     Sled push pull   50# 180'   75# 61'    50# 80'    Benavidez carry  9#/8# 380'  15# 200'        Rack pull to RDL     20# 2 x 5   30# 3 x 5 RPE 7        Assisted box squat      Rings to 12\" box 2 x 10   16\" to 12\" 2 x 5    Dead lift      20# 5 x   25# 5x   30# 5 x  30# 5 x    45# 3 x 5    Modified burbee       From floor 6 x      Split squat      2 x 5 ea use of bar for support           MANUAL THERAPY: (0 minutes): Joint mobilization, Soft tissue mobilization was utilized and necessary because of the patient's restricted joint motion and restricted motion of soft tissue mobility. Date  4/26/2022    Technique Used Grade  Level # Time(s) Effect while being performed                                                                 HEP Log Date 1. See treatment flowsheet  4/8/2022   2.  4/26/2022   3. 4/26/2022   4.    5.           [employer] Portal  Treatment/Session Summary:    Response to Treatment: Mild soreness and fatigue did not require cherry todayt get up from 12\" box. Used green band for cueing during box squat. Patient removed shoes during deadlift to allow better balance over feet. Noted improvement of bar path without shoes.     Communication/Consultation:  Work on squat from low chair or stool without hands Equipment provided today: None today   Recommendations/Intent for next treatment session:   Next visit will focus on functional strengthening. Treatment Plan of Care Effective Dates: 3/31/2022 TO 5/302022 (60 days).   Frequency/Duration: 2 times a week for 60 Days             Total Treatment Billable Duration: 40 min    Rx  PT Patient Time In/Time Out  Time In: 1115  Time Out: [address], PT    Future Appointments   Date Time Provider Edmund Arriaza   4/28/2022  4:30 PM Eryn Tomlin, PT [facility] AND [facility]   5/3/2022 11:15 AM PapRoxanne barrow, PT [facility]   5/5/2022 11:15 AM PapRoxanne barrow, PT [facility]   5/10/2022 11:15 AM Pap[facility]oxanne barrow, PT [facility]   5/12/2022 11:15 AM PapRoxanne barrow, PT [facility]   1/4/2023  7:45 AM MAT LAB [facility]   1/11/2023 11:00 AM Stephanie Cardozo MD [facility]

## 2022-04-28 ENCOUNTER — HOSPITAL ENCOUNTER (OUTPATIENT)
Dept: PHYSICAL THERAPY | Age: 67
Discharge: HOME | End: 2022-04-28
Payer: MEDICARE

## 2022-04-28 PROCEDURE — 97110 THERAPEUTIC EXERCISES: CPT

## 2022-04-28 PROCEDURE — 97530 THERAPEUTIC ACTIVITIES: CPT

## 2022-04-28 NOTE — PROGRESS NOTES
Stephany Rodriguez  : 1955  Payor: Brandon Faith / Plan: [address] HMO / Product Type: Managed Care Medicare /  [address] at [address] [place]. [address], [facility], [address] [place] [address] Road  [phone]   [phone]                                                          Allyson Dempsey NP      OUTPATIENT PHYSICAL THERAPY: Daily Treatment Note 2022 Visit Count:  8    Tx Diagnosis:  *Low back pain (M54.5)  Muscle Weakness, Generalized (M62.81)**      Pre-treatment Symptoms/Complaints: Pt reports that her back has been feeling much better and she feels stronger. Today is the first time she has had pain going to her knee was a couple of hour ago but since has improved. Pain: Initial:3/10  Medications Last Reviewed:  2022     Post Session: 2/10   Updated Objective Findings:  lumbar flexion to toes, extension scapula past mid line. Squat to 12\" box. Hip  dominant squat with bilateral knee valgus. TREATMENT:   THERAPEUTIC EXERCISE: (10 minutes):  Exercises per grid below to improve mobility, strength and balance. Required minimal visual, verbal and manual cues to promote proper body alignment and promote proper body posture. Progressed resistance and complexity of movement as indicated.      Date:  22 Date:  4/15/22 Date  22 Date  22 Date  22 Date  2022   Activity/Exercise Parameters Parameters       Education Post ex recovery walking        L stretch 10 x 10 x 10 x 10 10 x 10xs   Cat camel  10 x  Pre and post work out 20 x 10 x      Hip hinge quadruped 10 x 10 x       LTR         Open book         Figure 4 stretch         Standing hip flexion with band 2 x 10 yellow 20 x  20 x ea      Resisted sidewalking 50' purple band 75# purple band Purple 60'   60' Purple 60 ft    Bird dog 10 x ea side 10 x ea       Hip hinge with pole 10 x  10 x  10 x      Box squat with overhead reach  2 x10 Box squat 5# 4 x 5 RPE 7 Paused box squat 3 x 8  3 x 10 16\" 3 x 10 16\"     RDL  7# 2 x 10 10# 10 x  15# 2 x 5       Walking treadmill  5 min warmup 5 min  5 min 5 min  5 min         THERAPEUTIC ACTIVITY: (30 minutes): Activities per gid below to improve functional movement related mobility, strength and balance to improve neuro-muscular carryover to daily functional activities for improving patient's quality of life. Required visual, verbal and manual cues to promote proper body alignment and promote proper body posture/mechanics. Progressed resistance and complexity of movement as indicated. Date:  4/15/2022 Date:  4/19/22 Date:  4/21/22 Date  4/26/22 Date  4/28/2022   Activity/Exercise Parameters Parameters Parameters      Sled push pull   50# 180'   75# 60'    50# 120' 50 lbs 210    Benavidez carry  9#/8# 380'  15# 200'          Rack pull to RDL     20# 2 x 5   30# 3 x 5 RPE 7          Assisted box squat      Rings to 12\" box 2 x 10   16\" to 12\" 2 x 5  16\" to 12\" 2 x 5    Dead lift      20# 5 x   25# 5x   30# 5 x  30# 5 x    45# 3 x 5 45lbs 1x5  50lbs 3x5    Modified burbee       From floor 6 x        Split squat      2 x 5 ea use of bar for support       Rolling Patter         LE 7xs each side         MANUAL THERAPY: (0 minutes): Joint mobilization, Soft tissue mobilization was utilized and necessary because of the patient's restricted joint motion and restricted motion of soft tissue mobility. Date  4/28/2022    Technique Used Grade  Level # Time(s) Effect while being performed                                                                 HEP Log Date 1. See treatment flowsheet  4/8/2022   2.  4/28/2022   3. 4/28/2022   4.    5.           [employer] Portal  Treatment/Session Summary:    Response to Treatment: Mild soreness post SLED work improved with rolling patterns. Discussed application of rolling patterns pre and post workouts.  Focused on breathing and bracing cues with deadlifts Communication/Consultation:  Work on squat from low chair or stool without hands   Equipment provided today: None today   Recommendations/Intent for next treatment session:   Next visit will focus on functional strengthening. Treatment Plan of Care Effective Dates: 3/31/2022 TO 5/302022 (60 days).   Frequency/Duration: 2 times a week for 60 Days             Total Treatment Billable Duration: 40 min    Rx  PT Patient Time In/Time Out  Time In: 1630  Time Out: [address] PT    Future Appointments   Date Time Provider Edmund Arriaza   5/3/2022 11:15 AM Gabby Torres, PT [facility] AND [facility]   5/5/2022 11:15 AM Gabby Torres, PT [facility]   5/10/2022 11:15 AM Pap[facility]by barrow[facility]   5/12/2022 11:15 AM PapGabby barrow, PT [facility]   1/4/2023  7:45 AM MAT LAB [facility]   1/11/2023 11:00 AM Yu Acharya MD [facility]

## 2022-05-02 ENCOUNTER — HOSPITAL ENCOUNTER (OUTPATIENT)
Dept: PHYSICAL THERAPY | Age: 67
Setting detail: RECURRING SERIES
Discharge: HOME | End: 2022-05-05

## 2022-05-03 ENCOUNTER — HOSPITAL ENCOUNTER (OUTPATIENT)
Dept: PHYSICAL THERAPY | Age: 67
Discharge: HOME | End: 2022-05-03
Payer: MEDICARE

## 2022-05-03 PROCEDURE — 97530 THERAPEUTIC ACTIVITIES: CPT

## 2022-05-03 PROCEDURE — 97110 THERAPEUTIC EXERCISES: CPT

## 2022-05-03 NOTE — PROGRESS NOTES
Wagner Toro  : 1955  Payor: Ruslan Nunez / Plan: [address] HMO / Product Type: Managed Care Medicare /  [address] [place]. [address], [address]  [phone]   [phone]                                                          Roque Hernandez NP      OUTPATIENT PHYSICAL THERAPY: Daily Treatment Note 5/3/2022 Visit Count:  9    Tx Diagnosis:  *Low back pain (M54.5)  Muscle Weakness, Generalized (M62.81)**      Pre-treatment Symptoms/Complaints: Pt reports that her back rosa good today   Pain: Initial:3/10  Medications Last Reviewed:  5/3/2022     Post Session: 2/10   Updated Objective Findings:  lumbar flexion to toes, extension scapula past mid line. Squat to 12\" box. Hip  dominant squat with bilateral knee valgus. TREATMENT:   THERAPEUTIC EXERCISE: (10 minutes):  Exercises per grid below to improve mobility, strength and balance. Required minimal visual, verbal and manual cues to promote proper body alignment and promote proper body posture. Progressed resistance and complexity of movement as indicated.      Date 4/12/22 4/15/22 4/19/22 4/21/22 4/26/22 4/28/22 Date 5/3/22   Activity/Exercise Parameters Parameters        Education Post ex recovery walking         L stretch 10 x 10 x 10 x 10 10 x 10xs 10 x    Cat camel  10 x  Pre and post work out 20 x 10 x       Hip hinge quadruped 10 x 10 x        LTR          Open book          Figure 4 stretch          Standing hip flexion with band 2 x 10 yellow 20 x  20 x ea    20 x 2 yellow   Resisted sidewalking 50' purple band 75# purple band Purple 60'   60' Purple 60 ft  60 ft purple   Bird dog 10 x ea side 10 x ea        Hip hinge with pole 10 x  10 x  10 x       Box squat with overhead reach  2 x10 Box squat 5# 4 x 5 RPE 7 Paused box squat 3 x 8  3 x 10 16\" 3 x 10 16\"      RDL  7# 2 x 10 10# 10 x  15# 2 x 5        Walking treadmill  5 min warmup 5 min  5 min 5 min  5 min 5 min          THERAPEUTIC ACTIVITY: (30 minutes): Activities per gid below to improve functional movement related mobility, strength and balance to improve neuro-muscular carryover to daily functional activities for improving patient's quality of life. Required visual, verbal and manual cues to promote proper body alignment and promote proper body posture/mechanics. Progressed resistance and complexity of movement as indicated. Date:  4/15/2022 Date:  4/19/22 Date:  4/21/22 Date  4/26/22 Date  4/28/2022 Date  5/3/22   Activity/Exercise Parameters Parameters Parameters       Sled push pull   50# 180'   75# 61'    50# 120' 50 lbs 210' 60# 80'    Benavidez carry  9#/8# 380'  15# 200'            Rack pull to RDL     20# 2 x 5   30# 3 x 5 RPE 7            Assisted box squat      Rings to 12\" box 2 x 10   16\" to 12\" 2 x 5  16\" to 12\" 2 x 5  14\" 3 x 5     Dead lift      20# 5 x   25# 5x   30# 5 x  30# 5 x    45# 3 x 5 45lbs 1x5  50lbs 3x5 45# 5 x  55# 3 x5    Modified burbee       From floor 6 x      8 x with hop    Split squat      2 x 5 ea use of bar for support         Rolling Patterns         LE 7xs each side    Step ups          12\" 3 x 5         MANUAL THERAPY: (0 minutes): Joint mobilization, Soft tissue mobilization was utilized and necessary because of the patient's restricted joint motion and restricted motion of soft tissue mobility. Date  5/3/2022    Technique Used Grade  Level # Time(s) Effect while being performed                                                                 HEP Log Date 1.    See treatment flowsheet  4/8/2022   2.  5/3/2022   3. 5/3/2022   4.    5.           [employer] Portal  Treatment/Session Summary:    Response to Treatment:  Focused on breathing and bracing cues with deadlifts   Communication/Consultation:  Work on squat from low chair or stool without hands   Equipment provided today: None today Recommendations/Intent for next treatment session:   Next visit will focus on functional strengthening. Treatment Plan of Care Effective Dates: 3/31/2022 TO 5/302022 (60 days).   Frequency/Duration: 2 times a week for 60 Days             Total Treatment Billable Duration: 40 min    Rx  PT Patient Time In/Time Out  Time In: 1115  Time Out: [address] Angela, PT    Future Appointments   Date Time Provider Edmund Arriaza   5/5/2022 11:15 AM Ronaldo Torres, PT [facility] AND [facility]   5/10/2022 11:15 AM PapRonaldo barrow, PT [facility]   5/12/2022 11:15 AM PapRonaldo barrow, PT [facility]   1/4/2023  7:45 AM MAT LAB [facility]   1/11/2023 11:00 AM Mariel Hough MD [facility]

## 2022-05-05 ENCOUNTER — HOSPITAL ENCOUNTER (OUTPATIENT)
Dept: PHYSICAL THERAPY | Age: 67
Discharge: HOME | End: 2022-05-05
Payer: MEDICARE

## 2022-05-05 PROCEDURE — 97530 THERAPEUTIC ACTIVITIES: CPT

## 2022-05-05 PROCEDURE — 97110 THERAPEUTIC EXERCISES: CPT

## 2022-05-05 NOTE — PROGRESS NOTES
Megan Meyer  : 1955  Payor: Ramilaregina Arsenio / Plan: [address] HMO / Product Type: Managed Care Medicare /  [address] [facility] at [address] [facility]. [address] [place] [address] [address] [place] [address], [facility], [address] [place] [address] Road  [phone]   [phone]                                                          Alix Cruz NP      OUTPATIENT PHYSICAL THERAPY: Daily Treatment Note 2022 Visit Count:  10    Tx Diagnosis:  *Low back pain (M54.5)  Muscle Weakness, Generalized (M62.81)**      Pre-treatment Symptoms/Complaints: Pt reports that she is having some left lateral hip pain today. Didn't do anything different. Pain: Initial: 4/10   Medications Last Reviewed:  2022     Post Session:  3/10   Updated Objective Findings:  lumbar flexion to toes, extension scapula past mid line. Squat to 12\" box. Hip  dominant squat with bilateral knee valgus. Hip range of motion WNL mild pain with FADIR        TREATMENT:   THERAPEUTIC EXERCISE: (30 minutes):  Exercises per grid below to improve mobility, strength and balance. Required minimal visual, verbal and manual cues to promote proper body alignment and promote proper body posture. Progressed resistance and complexity of movement as indicated.      Date 4/12/22 4/15/22 4/19/22 4/21/22 4/26/22 4/28/22 Date 5/3/22 Date  22   Activity/Exercise Parameters Parameters         Education Post ex recovery walking       Pain science ed    L stretch 10 x 10 x 10 x 10 10 x 10xs 10 x  10 x   Cat camel  10 x  Pre and post work out 20 x 10 x     20 x   Hip hinge quadruped 10 x 10 x      20 x   LTR           Open book           Figure 4 stretch           Standing hip flexion with band 2 x 10 yellow 20 x  20 x ea    20 x 2 yellow 20 x yellow   Resisted sidewalking 50' purple band 75# purple band Purple 60'   60' Purple 60 ft  60 ft purple    Bird dog 10 x ea side 10 x ea         Hip hinge with pole 10 x  10 x  10 x        Box squat with overhead reach  2 x10 Box squat 5# 4 x 5 RPE 7 Paused box squat 3 x 8  3 x 10 16\" 3 x 10 16\"       RDL  7# 2 x 10 10# 10 x  15# 2 x 5         Walking treadmill  5 min warmup 5 min  5 min 5 min  5 min 5 min  5 min    Bridging         5 sec hold 20 x    Clam shells        20 x  ea         THERAPEUTIC ACTIVITY: (10 minutes): Activities per gid below to improve functional movement related mobility, strength and balance to improve neuro-muscular carryover to daily functional activities for improving patient's quality of life. Required visual, verbal and manual cues to promote proper body alignment and promote proper body posture/mechanics. Progressed resistance and complexity of movement as indicated. Date:  4/15/2022 Date:  4/19/22 Date:  4/21/22 Date  4/26/22 Date  4/28/2022 Date  5/3/22 Date  5/5/22   Activity/Exercise Parameters Parameters Parameters        Sled push pull   50# 180'   75# 60'    50# 120' 50 lbs 210' 60# 180' 50# 2 min     Benavidez carry  9#/8# 380'  15# 200'              Rack pull to RDL     20# 2 x 5   30# 3 x 5 RPE 7              Assisted box squat      Rings to 12\" box 2 x 10   16\" to 12\" 2 x 5  16\" to 12\" 2 x 5  14\" 3 x 5   12\" 10 x    Dead lift      20# 5 x   25# 5x   30# 5 x  30# 5 x    45# 3 x 5 45lbs 1x5  50lbs 3x5 45# 5 x  55# 3 x5     Modified burbee       From floor 6 x      8 x with hop      Split squat      2 x 5 ea use of bar for support           Rolling Patterns         LE 7xs each side     Step ups          12\" 3 x 5          MANUAL THERAPY: (0 minutes): Joint mobilization, Soft tissue mobilization was utilized and necessary because of the patient's restricted joint motion and restricted motion of soft tissue mobility. Date  5/5/2022    Technique Used Grade  Level # Time(s) Effect while being performed                                                                 HEP Log Date 1. See treatment flowsheet  4/8/2022   2.  5/5/2022   3. 5/5/2022   4.    5.           [employer] Portal  Treatment/Session Summary:    Response to Treatment:  decrease in pain with ex   Communication/Consultation:  Modified program due to pain    Equipment provided today: None today   Recommendations/Intent for next treatment session:   Next visit will focus on functional strengthening. Treatment Plan of Care Effective Dates: 3/31/2022 TO 5/302022 (60 days).   Frequency/Duration: 2 times a week for 60 Days             Total Treatment Billable Duration: 40 min    Rx     Judti Santos, PT    Future Appointments   Date Time Provider Edmund Arriaza   5/10/2022 11:15 AM Tessy Torres, PT [facility] AND [facility]   5/12/2022 11:15 AM Tessy Torres, PT [facility]   1/4/2023  7:45 AM MAT LAB [facility]   1/11/2023 11:00 AM MD JOSH [facility]

## 2022-05-10 ENCOUNTER — HOSPITAL ENCOUNTER (OUTPATIENT)
Dept: PHYSICAL THERAPY | Age: 67
Discharge: HOME | End: 2022-05-10
Payer: MEDICARE

## 2022-05-10 PROCEDURE — 97110 THERAPEUTIC EXERCISES: CPT

## 2022-05-10 PROCEDURE — 97530 THERAPEUTIC ACTIVITIES: CPT

## 2022-05-10 NOTE — PROGRESS NOTES
Stephany Rodriguez  : 1955  Payor: Brandon Faith / Plan: [address] HMO / Product Type: Managed Care Medicare /  [address] [place] [address] at [address] [place] [address]. Erika Good, [address] [place] [address], [address] [place] [address]  [phone]   [phone]                                                          Allyson Dempsey NP      OUTPATIENT PHYSICAL THERAPY: Daily Treatment Note 5/10/2022 Visit Count:  11    Tx Diagnosis:  *Low back pain (M54.5)  Muscle Weakness, Generalized (M62.81)**      Pre-treatment Symptoms/Complaints: Pt reports that she is feeling much better   Pain: Initial: 4/10   Medications Last Reviewed:  5/10/2022     Post Session:  3/10   Updated Objective Findings:  lumbar flexion to toes, extension scapula past mid line. Squat to 12\" box. Hip  dominant squat with bilateral knee valgus. Hip range of motion WNL mild pain with FADIR see progress note        TREATMENT:   THERAPEUTIC EXERCISE: (15 minutes):  Exercises per grid below to improve mobility, strength and balance. Required minimal visual, verbal and manual cues to promote proper body alignment and promote proper body posture. Progressed resistance and complexity of movement as indicated.      Date 4/12/22 4/15/22 4/19/22 4/21/22 4/26/22 4/28/22 Date 5/3/22 Date  5/5/22 5/10/22   Activity/Exercise Parameters Parameters          Education Post ex recovery walking       Pain science ed  Pain science ed   L stretch 10 x 10 x 10 x 10 10 x 10xs 10 x  10 x 10 x    Cat camel  10 x  Pre and post work out 20 x 10 x     20 x 20   Hip hinge quadruped 10 x 10 x      20 x 20   LTR            Open book            Figure 4 stretch            Standing hip flexion with band 2 x 10 yellow 20 x  20 x ea    20 x 2 yellow 20 x yellow    Resisted sidewalking 50' purple band 75# purple band Purple 60'   60' Purple 60 ft  60 ft purple     Bird dog 10 x ea side 10 x ea          Hip hinge with pole 10 x  10 x  10 x         Box squat with overhead reach  2 x10 Box squat 5# 4 x 5 RPE 7 Paused box squat 3 x 8  3 x 10 16\" 3 x 10 16\"        RDL  7# 2 x 10 10# 10 x  15# 2 x 5          Walking treadmill  5 min warmup 5 min  5 min 5 min  5 min 5 min  5 min  5 min    Bridging         5 sec hold 20 x     Clam shells        20 x  ea          THERAPEUTIC ACTIVITY: (23 minutes): Activities per gid below to improve functional movement related mobility, strength and balance to improve neuro-muscular carryover to daily functional activities for improving patient's quality of life. Required visual, verbal and manual cues to promote proper body alignment and promote proper body posture/mechanics. Progressed resistance and complexity of movement as indicated. Date:  4/15/2022 Date:  4/19/22 Date:  4/21/22 Date  4/26/22 Date  4/28/2022 Date  5/3/22 Date  5/5/22 Date  5/10/22   Activity/Exercise Parameters Parameters Parameters         Sled push pull   50# 180'   75# 60'    50# 120' 50 lbs 210' 60# 180' 50# 2 min      Benavidez carry  9#/8# 380'  15# 200'                Rack pull to RDL     20# 2 x 5   30# 3 x 5 RPE 7                Assisted box squat      Rings to 12\" box 2 x 10   16\" to 12\" 2 x 5  16\" to 12\" 2 x 5  14\" 3 x 5   12\" 10 x  12\" 10x   10# 3 x 5    Dead lift      20# 5 x   25# 5x   30# 5 x  30# 5 x    45# 3 x 5 45lbs 1x5  50lbs 3x5 45# 5 x  55# 3 x5  45# 5 x  55# 3 x  65#    Modified burbee       From floor 6 x      8 x with hop    10 x    Split squat      2 x 5 ea use of bar for support             Rolling Patterns         LE 7xs each side      Step ups          12\" 3 x 5           MANUAL THERAPY: (0 minutes): Joint mobilization, Soft tissue mobilization was utilized and necessary because of the patient's restricted joint motion and restricted motion of soft tissue mobility.         Date  5/10/2022    Technique Used Grade  Level # Time(s) Effect while being performed HEP Log Date 1. See treatment flowsheet  4/8/2022   2.  5/10/2022   3. 5/10/2022   4.    5.           [employer] Portal  Treatment/Session Summary:    Response to Treatment:  decrease in pain with ex   Communication/Consultation:  Modified program due to pain    Equipment provided today: None today   Recommendations/Intent for next treatment session:   Next visit will focus on functional strengthening. Treatment Plan of Care Effective Dates: 3/31/2022 TO 5/302022 (60 days).   Frequency/Duration: 2 times a week for 60 Days             Total Treatment Billable Duration: 38 min    Rx  PT Patient Time In/Time Out  Time In: 0945  Time Out: 1430 [facility], PT    Future Appointments   Date Time Provider Edmund Arriaza   5/12/2022 11:15 AM Chava Torres, PT [facility]

## 2022-05-10 NOTE — PROGRESS NOTES
Bryanna Chong  : 1955      Payor: John Chavirar / Plan: [address] [place] [address] HMO / Product Type: Managed Care Medicare /  [address], [place], [address]  [phone]   [phone]       OUTPATIENT PHYSICAL THERAPY:Progress Report 5/10/2022      ICD-10: Treatment Diagnosis:   Low back pain (M54.5)  Muscle Weakness, Generalized (M62.81)                 Precautions/Allergies:   Adhesive, Codeine, Iodinated contrast media, and Other medication   Fall Risk Score: 0 (? 5 = High Risk)  MD Orders: Eval and Treat  MEDICAL/REFERRING DIAGNOSIS:  Acute midline low back pain with right-sided sciatica   DATE OF ONSET: one month  REFERRING PHYSICIAN: Misty mAador NP  RETURN PHYSICIAN APPOINTMENT: TBD     CURRENT ASSESSMENT:  Ms. Yessi Coleman has improved her Oswestry score by 9 points, her 30 second Sit to Stand score by 5, and the TUG score by 1.5 seconds. Yessi Coleman will benefit from continued skilled physical therapy (medically necessary) to address above deficits affecting participation in basic ADLs and functional mobility/tolerance. Patient will benefit from  therapeutic exercises and activities, postural strengthening/education, and comprehensive home exercises program to address current impairments and functional limitations. PROBLEM LIST (Impacting functional limitations):  1. Decreased Strength  2. Decreased ADL/Functional Activities  3. Decreased Transfer Abilities  4. Decreased Ambulation Ability/Technique  5. Decreased Balance  6. Increased Pain  7. Decreased Activity Tolerance  8. Decreased Flexibility/Joint Mobility  9. Decreased [place] with Home Exercise Program INTERVENTIONS PLANNED:    1. Family Education  2. Home Exercise Program (HEP)  3. Range of Motion (ROM)  4. Therapeutic Activites  5.  Therapeutic Exercise/Strengthening     TREATMENT PLAN:  Effective Dates: 3/31/2022 TO 5/30/2022 (60 days). Frequency/Duration: 2 times a week for 60 Days  GOALS: (Goals have been discussed and agreed upon with patient.)     Short-Term Goals~4-6 weeks  Goal Met   1. Beny Daily will be independent with HEP 1.  [] Date:   2. Beny Daily will participate in LE stretching program to increase flexibility    2. [x] Date:    3. Beny Daily will improve NEREYDA by 5 points. 3.  [x] Date:   4. Beny Daily will improve 30 sec Sit to Stand Test by 5 reps indicating increase in overall functional strength. 4.  [x] Date:   5. Beny Daily will be able to lift and carry 30 pounds without difficulty. 5.  [] Date:   6. Beny Daily will return to [facility] for regular strength training. 6.  [] Date:                 Outcome Measure: Tool Used: Modified Oswestry Low Back Pain Questionnaire    Score:  Initial: 13/50  Most Recent:4/50 (Date:5/10/22)   Interpretation of Score: Each section is scored on a 0-5 scale, 5 representing the greatest disability. The scores of each section are added together for a total score of 50. Medical Necessity:   · Skilled intervention continues to be required due to above deficits affecting participation in basic ADLs and overall functional tolerance. Reason for Services/Other Comments:  · Patient continues to require skilled intervention due to  above deficits affecting participation in basic ADLs and overall functional tolerance. Total Treatment Duration:  PT Patient Time In/Time Out  Time In: 0945  Time Out: 1030    Rehabilitation Potential For Stated Goals: GOOD  Regarding [address] therapy, I certify that the treatment plan above will be carried out by a therapist or under their direction. Thank you for this referral,  Barry Urbina PT     Referring Physician Signature: Bijal Salas NP _______________________________ Date _____________            HISTORY:   History of Present Injury/Illness (Reason for Referral):   Two month history of low back pain with radiation into right thigh. States that she had been doing a lot of painting in her house. Pain not affected by time of day. Was doing some yard work until a month ago. -Present symptoms/complaints (on day of evaluation) bilateral hip and back pain  Pain Scale:  · Current: 0/10  · Best: 0/10  · Worst:4/10      · Aggravating factors: bending, Lifting and twisting motions right leg can get pain in groin   · Relieving factors: Rest and Exercise  · Irritability: Low (Onset of pain is is longer than the time it takes for Pain to go away)      Past Medical History/Comorbidities:   Ms. Nelly Zhou  has a past medical history of Ear problems, GERD (gastroesophageal reflux disease) (), Hearing reduced, History of mammogram (2019), Hyperlipidemia (), Migraine headache, Osteoarthritis (), and Sinus problem. Ms. Nelly Zhou  has a past surgical history that includes hx shoulder arthroscopy (Right, 2017); hx orthopaedic (Right, 2016); hx orthopaedic (Left, 10/2015); hx tonsillectomy (); hx bunionectomy (Left, ); hx total laparoscopic hysterectomy (); hx other surgical (); hx breast biopsy (Right); and hx colonoscopy (). Social History/Living Environment:    Walks a mile a day, has not done any strength training in 4 months. Used to go to [facility]. Social History     Socioeconomic History    Marital status:      Spouse name: Not on file    Number of children: 1    Years of education: Not on file    Highest education level: Not on file   Occupational History    Occupation: retired, laser optic technician   Tobacco Use    Smoking status: Former Smoker     Years: 22.00     Types: Cigarettes     Quit date: 1991     Years since quittin.3    Smokeless tobacco: Never Used   Vaping Use    Vaping Use: Never used   Substance and Sexual Activity    Alcohol use:  Yes     Alcohol/week: 7.0 - 8.0 standard drinks     Types: 7 - 8 Glasses of wine per week    Drug use: No    Sexual activity: Yes   Other Topics Concern    Not on file   Social History Narrative    Not on file     Social Determinants of Health     Financial Resource Strain:     Difficulty of Paying Living Expenses: Not on file   Food Insecurity:     Worried About Running Out of Food in the Last Year: Not on file    Soo of Food in the Last Year: Not on file   Transportation Needs:     Lack of Transportation (Medical): Not on file    Lack of Transportation (Non-Medical):  Not on file   Physical Activity:     Days of Exercise per Week: Not on file    Minutes of Exercise per Session: Not on file   Stress:     Feeling of Stress : Not on file   Social Connections:     Frequency of Communication with Friends and Family: Not on file    Frequency of Social Gatherings with Friends and Family: Not on file    Attends Yazidism Services: Not on file    Active Member of [address] [place] [address] [employer] or Organizations: Not on file    Attends Club or Organization Meetings: Not on file    Marital Status: Not on file   Intimate Partner Violence:     Fear of Current or Ex-Partner: Not on file    Emotionally Abused: Not on file    Physically Abused: Not on file    Sexually Abused: Not on file   Housing Stability:     Unable to Pay for Housing in the Last Year: Not on file    Number of [place] in the Last Year: Not on file    Unstable Housing in the Last Year: Not on file     Prior Level of Function/Work/Activity:  Unrestricted  Previous Treatment Approach  self treat      Active Ambulatory Problems     Diagnosis Date Noted    GERD (gastroesophageal reflux disease) 01/01/2006    Osteoarthritis 01/01/1995    Mixed hyperlipidemia 11/08/2017    Chronic right shoulder pain 11/08/2017    Vertigo 03/21/2018    Migraine without aura or status migrainosus 03/21/2018    Osteopenia of multiple sites 03/21/2018    Anxiety and depression 06/15/2018    Seasonal allergic rhinitis due to pollen 09/25/2018    Urinary incontinence 03/22/2019    Essential hypertension 09/08/2020    Menopause 09/08/2020    Insomnia 09/08/2020     Resolved Ambulatory Problems     Diagnosis Date Noted    Bradycardia 11/08/2017    Breast cancer screening, high risk patient 11/08/2017    Colon cancer screening 11/08/2017    History of migraine headaches 11/08/2017    Headache 03/21/2018    Impacted cerumen of right ear 03/21/2018    Arthralgia 03/21/2018    Impacted cerumen of left ear 09/25/2018    Decreased hearing of left ear 09/25/2018    Sleep disturbance 11/28/2018    Welcome to Medicare preventive visit 12/03/2019     Past Medical History:   Diagnosis Date    Ear problems     Hearing reduced     History of mammogram 08/2019    Hyperlipidemia 2016    Migraine headache     Sinus problem      Note: Patient denies any increase of symptoms with cough, sneeze or valsalva. Patient denies any saddle paresthesia or bowel/bladder deficits. Current Medications:    Current Outpatient Medications:     omeprazole (PRILOSEC) 20 mg capsule, Take 1 Capsule by mouth daily. , Disp: 90 Capsule, Rfl: 1    naproxen (NAPROSYN) 500 mg tablet, Take 1 Tablet by mouth two (2) times daily (with meals). , Disp: 30 Tablet, Rfl: 1    rizatriptan (MAXALT-MLT) 5 mg rapid dissolve tablet, , Disp: , Rfl:     traZODone (DESYREL) 50 mg tablet, TAKE 1 TABLET BY MOUTH EVERY DAY AT NIGHT, Disp: 90 Tablet, Rfl: 3    estradioL (ESTRACE) 0.01 % (0.1 mg/gram) vaginal cream, INSERT 2 G INTO VAGINA DAILY. , Disp: 42.5 g, Rfl: 0    atenoloL (TENORMIN) 50 mg tablet, Take 1 Tablet by mouth daily. , Disp: 90 Tablet, Rfl: 1    polyethylene glycol (Miralax) 17 gram/dose powder, Take 17 g by mouth daily. , Disp: , Rfl:     escitalopram oxalate (LEXAPRO) 20 mg tablet, TAKE 1 TABLET EVERY DAY, Disp: 90 Tablet, Rfl: 1    busPIRone (BUSPAR) 5 mg tablet, Take 1 Tab by mouth two (2) times daily as needed (anxiety). , Disp: 60 Tab, Rfl: 0    valACYclovir (Valtrex) 1 gram tablet, Take 1 Tab by mouth two (2) times daily as needed for Other (HSV). , Disp: 60 Tab, Rfl: 11    Bifidobacterium infantis (ALIGN PO), Take  by mouth., Disp: , Rfl:     TURMERIC PO, Take  by mouth., Disp: , Rfl:     methylcellulose (CITRUCEL PO), Take  by mouth., Disp: , Rfl:     inulin (FIBER GUMMIES PO), Take  by mouth., Disp: , Rfl:     calcium citrate-vitamin d3 (CITRACAL+D) 315-200 mg-unit tab, Take 1 Tab by mouth daily (with breakfast). , Disp: , Rfl:     Calcium-Cholecalciferol, D3, (CALCIUM 600 WITH VITAMIN D3) 600 mg(1,500mg) -400 unit cap, Take  by mouth., Disp: , Rfl:     multivitamin (ONE A DAY) tablet, Take 1 Tab by mouth daily. , Disp: , Rfl:     fluticasone (FLONASE) 50 mcg/actuation nasal spray, 2 Sprays by Both Nostrils route daily. , Disp: , Rfl:     biotin 5,000 mcg TbDi, Take  by mouth., Disp: , Rfl:     lysine 1,000 mg tab, Take  by mouth., Disp: , Rfl:       Ambulatory/Rehab Services H2 Model Falls Risk Assessment    Risk Factors:       No Risk Factors Identified Ability to Rise from Chair:       (0)  Ability to rise in a single movement    Falls Prevention Plan:       No modifications necessary   Total: (5 or greater = High Risk): 0    ©2010 [facility] [address] [place] [address] Patent #[id number].  Federal Law prohibits the replication, distribution or use without written permission from [facility]       Date Last Reviewed:  5/10/2022   EXAMINATION:   Observation/Orthostatic Postural Assessment:        Normal presentattion    ROM:            AROM/PROM       RIGHT LEFT   Knee Extension  WNL  WNL   Knee Flexion  WNL  WNL   Hip Flexion  WNL  WNL   Hip Abduction  WNL  WNL   Hip ER  55  60   Hip IR  10  20     Lumbar ROM     Movement Range Descriptor Degrees Notes   Flexion Hands to Floor Pain with Return  Central LB   Extension Shoulder > Midline Unremarkable         Repeated Motion:  Direction    Frequency Symptoms Prior Symptons Post   Flexion Repeated 10 times  Unremarkable   Extension Sustained Standing  Unremarkable         Strength:      RIGHT LEFT   Knee Flexion (L5-S2)  5/5  5/5   Knee Extension (L3, L4)  4+/5  4+/5   Hip Flexion (L1, L2)  4-/5  4-/5   Hip Extension  4-/5  4-/5   Hip Abduction (L5, S1)  4/5  4/5   Ankle Dorsiflexion (L4)  5/5  5/5   Great Toe Extension (L5)  5/5  5/5   Ankle Plantar Flexion (S1-S2)  4+/5  4+/5       Special Tests:  Lumbar:  SLR: Negative  SLUMP: Negative   SI Joint:  Not Tested   Hip:  FADIRs:Positive for pain on right         Neurological Screen:    RADIATING SYMPTOMS: Yes groin pain right > left intermittent radiation to knee      Functional Mobility:  Affecting participation in basic ADLs and functional tasks. Squat limited by hip pain right to 100 knee flexion. Balance and Mobility:    Test Result   Timed up and Go 4.44 Seconds   30 second Sit to Stand 15   6 Minute Walk Test    Single Leg Balance Right: <30 seconds     Left:<30 seconds          Body Structures Involved:  1. Bones  2. Joints  3. Muscles   Body Functions Affected:  1. Sensory/Pain  2. Neuromusculoskeletal  3. Movement Related Activities and Participation Affected:  1.  Mobility  2.   lifting     See associated treatment note for treatment provided today      Future Appointments   Date Time Provider Edmund Arriaza   5/12/2022 11:15 AM Sabino Torres PT [facility]         Sabino Torres PT     remember to save as eval note

## 2022-05-12 ENCOUNTER — HOSPITAL ENCOUNTER (OUTPATIENT)
Dept: PHYSICAL THERAPY | Age: 67
Discharge: HOME | End: 2022-05-12
Payer: MEDICARE

## 2022-05-12 PROCEDURE — 97110 THERAPEUTIC EXERCISES: CPT

## 2022-05-12 PROCEDURE — 97530 THERAPEUTIC ACTIVITIES: CPT

## 2022-05-12 NOTE — PROGRESS NOTES
Billy Palomino  : 1955  Payor: [place] / Plan: [address] HMO / Product Type: Managed Care Medicare /  [address] [place] [address] at [address] [place] [address]. Jorge A Sinclair., [address] [place] [address], [address] [place] [address]  [phone]   [phone]                                                          Charo Garcia NP      OUTPATIENT PHYSICAL THERAPY: Daily Treatment Note 2022 Visit Count:  12    Tx Diagnosis:  *Low back pain (M54.5)  Muscle Weakness, Generalized (M62.81)**      Pre-treatment Symptoms/Complaints: Pt reports that she is feeling much better   Pain: Initial: 4/10   Medications Last Reviewed:  2022     Post Session:  3/10   Updated Objective Findings:  lumbar flexion to toes, extension scapula past mid line. Squat to 9\" box. Hip  dominant squat with bilateral knee valgus. TREATMENT:   THERAPEUTIC EXERCISE: (10 minutes):  Exercises per grid below to improve mobility, strength and balance. Required minimal visual, verbal and manual cues to promote proper body alignment and promote proper body posture. Progressed resistance and complexity of movement as indicated.      Date 4/12/22 4/15/22 4/19/22 4/21/22 4/26/22 4/28/22 Date 5/3/22 Date  5/5/22 5/10/22 5/12/22   Activity/Exercise Parameters Parameters           Education Post ex recovery walking       Pain science ed  Pain science ed    L stretch 10 x 10 x 10 x 10 10 x 10xs 10 x  10 x 10 x  10 x   Cat camel  10 x  Pre and post work out 20 x 10 x     20 x 20    Hip hinge quadruped 10 x 10 x      20 x 20 20   LTR             Open book             Figure 4 stretch             Standing hip flexion with band 2 x 10 yellow 20 x  20 x ea    20 x 2 yellow 20 x yellow     Resisted sidewalking 50' purple band 75# purple band Purple 60'   60' Purple 60 ft  60 ft purple      Bird dog 10 x ea side 10 x ea           Hip hinge with pole 10 x  10 x  10 x          Box squat with overhead reach  2 x10 Box squat 5# 4 x 5 RPE 7 Paused box squat 3 x 8  3 x 10 16\" 3 x 10 16\"         RDL  7# 2 x 10 10# 10 x  15# 2 x 5           Walking treadmill  5 min warmup 5 min  5 min 5 min  5 min 5 min  5 min  5 min  5 min   Bridging         5 sec hold 20 x      Clam shells        20 x  ea           THERAPEUTIC ACTIVITY: (30 minutes): Activities per gid below to improve functional movement related mobility, strength and balance to improve neuro-muscular carryover to daily functional activities for improving patient's quality of life. Required visual, verbal and manual cues to promote proper body alignment and promote proper body posture/mechanics. Progressed resistance and complexity of movement as indicated. Date:  4/15/2022 Date:  4/19/22 Date:  4/21/22 Date  4/26/22 Date  4/28/2022 Date  5/3/22 Date  5/5/22 Date  5/10/22    Activity/Exercise Parameters Parameters Parameters          Sled push pull   50# 180'   75# 60'    50# 120' 50 lbs 210' 60# 180' 50# 2 min       Benavidez carry  9#/8# 380'  15# 200'              20# 15# 200'    Rack pull to RDL     20# 2 x 5   30# 3 x 5 RPE 7                  Assisted box squat      Rings to 12\" box 2 x 10   16\" to 12\" 2 x 5  16\" to 12\" 2 x 5  14\" 3 x 5   12\" 10 x  12\" 10x   10# 3 x 5  To 9\" 2 x 10    Dead lift      20# 5 x   25# 5x   30# 5 x  30# 5 x    45# 3 x 5 45lbs 1x5  50lbs 3x5 45# 5 x  55# 3 x5  45# 5 x  55# 3 x  65# 45# 5 x  55# 3 x  65# from rack 3 x   65# 4\" from floor 3 x 5      Modified burbee       From floor 6 x      8 x with hop    10 x  10 x    Split squat      2 x 5 ea use of bar for support               Rolling Patterns         LE 7xs each side       Step ups          12\" 3 x 5            MANUAL THERAPY: (0 minutes): Joint mobilization, Soft tissue mobilization was utilized and necessary because of the patient's restricted joint motion and restricted motion of soft tissue mobility.         Date  5/12/2022    Technique Used Grade  Level # Time(s) Effect while being performed                                                                 HEP Log Date 1. See treatment flowsheet  4/8/2022   2.  5/12/2022   3. 5/12/2022   4.    5.           [facility]  Treatment/Session Summary:    Response to Treatment:  twinge in right shoulder blade with farmer carry, range of motion not affected slight pain. Communication/Consultation:  Patient on vacation next two weeks, recommend continuing strength training up return. Equipment provided today: None today   Recommendations/Intent for next treatment session:   reevaluation          Treatment Plan of Care Effective Dates: 3/31/2022 TO 5/302022 (60 days). Frequency/Duration: 2 times a week for 60 Days             Total Treatment Billable Duration: 40 min    Rx  PT Patient Time In/Time Out  Time In: 1115  Time Out: [address], PT    No future appointments.

## 2022-05-27 ENCOUNTER — HOSPITAL ENCOUNTER (OUTPATIENT)
Dept: PHYSICAL THERAPY | Age: 67
Setting detail: RECURRING SERIES
Discharge: HOME | End: 2022-05-30
Payer: MEDICARE

## 2022-05-27 PROCEDURE — 97110 THERAPEUTIC EXERCISES: CPT

## 2022-05-27 PROCEDURE — 97530 THERAPEUTIC ACTIVITIES: CPT

## 2022-05-27 NOTE — PROGRESS NOTES
Rylie David  : 1955  Primary: [facility]  Secondary:  [address] [facility] [address]  Phone: [phone]  Fax: [phone] No data recorded  No data recorded    PT Visit Info: Total # of Visits to Date: 15      OUTPATIENT PHYSICAL THERAPY:OP NOTE TYPE: DISCHARGE and Treatment Note 2022       Episode  }Appt Desk              Treatment Diagnosis:  Low back pain (M54.5)  Muscle weakness: (M62.81)  Medical/Referring Diagnosis:  No admission diagnoses are documented for this encounter. Referring Physician:  ZEINAB Garcia - *  MD Orders:  PT Eval and Treat   Date of Onset: 2 months  Allergies:   Codeine  Restrictions/Precautions:  None  Interventions Planned (Treatment may consist of any combination of the following):    No data recorded   Subjective Comments:   CARTER score today 5. Patient states that she is ready to return to exercise at the [facility]. Initial:}     0/10Post Session:        0/10  Medications Last Reviewed:  2022     Updated Objective Findings:  Full lumbar motion painless, squat to 9 inch box. TU.09 sec, 30 sec Sit to Stand: 19 times  TREATMENT PLAN:  Effective Dates: 3/31/2022 TO 2022 (60 days). Frequency/Duration: 2 times a week for 60 Days  GOALS: (Goals have been discussed and agreed upon with patient.)       Short-Term Goals~4-6 weeks  Goal Met   1. Rylie David will be independent with HEP 1.  [x]? Date: 22   2. Rylie David will participate in LE stretching program to increase flexibility    2. [x]? Date:   3. Rylie David will improve CARTER by 5 points. 3.  [x]? Date:   4. Rylie David will improve 30 sec Sit to Stand Test by 5 reps indicating increase in overall functional strength. 4.  [x]? Date:   5. Rylie David will be able to lift and carry 30 pounds without difficulty. 5.  [x]? Date:   6.  Rylie David will return to [facility] for regular strength training. 6.  [x]? Date:                         Outcome Measure: Tool Used: Modified Oswestry Low Back Pain Questionnaire     Score:  Initial: 13/50  Most Recent: 5/50 (Date: 5/27/22)   Interpretation of Score: Each section is scored on a 0-5 scale, 5 representing the greatest disability. The scores of each section are added together for a total score of 50. Treatment     THERAPEUTIC EXERCISE: (23 minutes):    Exercises per grid below to improve mobility, strength, balance, and coordination. Required minimal verbal cues to promote proper body mechanics. Progressed resistance and repetitions as indicated. Date:  5/27/22 Date:   Date:     Activity/Exercise Parameters Parameters Parameters   Patient education Review benefits of strength training for longevity and function. Review of her current exercise flow sheet and how to transition into program at [facility]. treadmill 5 min warm up                                             THERAPEUTIC ACTIVITY: ( 15 minutes): Therapeutic activities per grid below to improve mobility, strength, coordination, and dynamic movement of generalized back to improve functional lifting, carrying, reaching, catching, and overhead activites. Required minimal verbal and tactile cues to promote motor control of lower extremity(s). Date:  5/27/22 Date:   Date:     Activity/Exercise Parameters Parameters Parameters         Box squat circuit 3 rounds 5 x 10#     Sled push circuit 3 rounds 60 feet 110 lbs     Modified burbpee circuit 3 rounds 5 x                           HEP Log Date   1.      2.     3.    4.     5.        POC    Recertification Expiration Date   5/30/22   Visit Count  1    Number of Allowed Visits              Treatment/Session Summary:      Treatment Assessment:     patient has achieved all goals and has been discharged from therapy.    Communication/Consultation:      Transition to [facility] provided today:  copy of exercise flow sheet Recommendations    DC to independent program patient has achieved all goals       Total Treatment Billable Duration:  45 minutes   Time In: 1100  Time Out: 1145 (11:45)    Juventino Cruz, PT       Charge Capture  }Post Session Pain  [facility] Portal  MD Guidelines  Scanned Media  Benefits  MyChart    Future Appointments   Date Time Provider Irineo Khalil   1/4/2023  7:45 AM [facility] AMB   1/11/2023 11:00 AM Jeana Brooks MD [facility] AMB

## 2022-06-02 ENCOUNTER — TELEPHONE (OUTPATIENT)
Dept: INTERNAL MEDICINE CLINIC | Facility: CLINIC | Age: 67
End: 2022-06-02

## 2022-06-03 ENCOUNTER — TELEPHONE (OUTPATIENT)
Dept: INTERNAL MEDICINE CLINIC | Facility: CLINIC | Age: 67
End: 2022-06-03

## 2022-06-03 DIAGNOSIS — B00.9 HSV INFECTION: Primary | ICD-10-CM

## 2022-06-03 RX ORDER — VALACYCLOVIR HYDROCHLORIDE 1 G/1
1000 TABLET, FILM COATED ORAL 2 TIMES DAILY PRN
Qty: 14 TABLET | Refills: 5 | Status: SHIPPED | OUTPATIENT
Start: 2022-06-03 | End: 2022-06-10

## 2022-07-25 ENCOUNTER — COMMUNITY OUTREACH (OUTPATIENT)
Dept: INTERNAL MEDICINE CLINIC | Facility: CLINIC | Age: 67
End: 2022-07-25

## 2022-07-25 NOTE — PROGRESS NOTES
Patient's HM shows they are current for Colorectal Screening. [facility] and  files searched with success. Results attached to order and HM updated.

## 2022-08-02 ENCOUNTER — PATIENT MESSAGE (OUTPATIENT)
Dept: INTERNAL MEDICINE CLINIC | Facility: CLINIC | Age: 67
End: 2022-08-02

## 2022-08-02 DIAGNOSIS — N95.1 POSTMENOPAUSAL DISORDER: Primary | ICD-10-CM

## 2022-08-03 RX ORDER — ESTRADIOL 0.1 MG/G
2 CREAM VAGINAL DAILY
Qty: 42.5 G | Refills: 0 | Status: SHIPPED | OUTPATIENT
Start: 2022-08-03

## 2022-08-03 NOTE — TELEPHONE ENCOUNTER
From: Aida Ozuna  To: Dr. Jeovanny Wang: 2022 9:02 PM EDT  Subject: Estradiol Refill    Hello,    Please send a refill request to [address] [place] [address]) for 1 tube of Estradiol 0.01 % (0.1 mg/gram) vaginal cream.    Thanks very much,  Katya Jordan

## 2022-08-16 RX ORDER — OMEPRAZOLE 20 MG/1
CAPSULE, DELAYED RELEASE ORAL
Qty: 90 CAPSULE | OUTPATIENT
Start: 2022-08-16

## 2022-09-12 ENCOUNTER — PATIENT MESSAGE (OUTPATIENT)
Dept: INTERNAL MEDICINE CLINIC | Facility: CLINIC | Age: 67
End: 2022-09-12

## 2022-09-12 DIAGNOSIS — K21.9 GASTROESOPHAGEAL REFLUX DISEASE, UNSPECIFIED WHETHER ESOPHAGITIS PRESENT: Primary | ICD-10-CM

## 2022-09-12 NOTE — TELEPHONE ENCOUNTER
From: Rae Anthony  To: Dr. Adenike Guerrero: 9/12/2022 4:45 PM EDT  Subject: Omeprozole    Hi there,    Once again, I'm trying to get Omeprazole refilled and [facility] says the request was denied. I don't know why I'm having problems with this every time. I am almost out, so I would appreciate it if you could call [facility] and prescribe Omeprazole with refills. Thanks!   Donna Richter

## 2022-09-13 RX ORDER — OMEPRAZOLE 20 MG/1
20 CAPSULE, DELAYED RELEASE ORAL DAILY
Qty: 90 CAPSULE | Refills: 1 | Status: SHIPPED | OUTPATIENT
Start: 2022-09-13 | End: 2022-09-14 | Stop reason: SDUPTHER

## 2022-09-14 RX ORDER — OMEPRAZOLE 20 MG/1
20 CAPSULE, DELAYED RELEASE ORAL DAILY
Qty: 90 CAPSULE | Refills: 1 | Status: SHIPPED | OUTPATIENT
Start: 2022-09-14

## 2022-09-26 ENCOUNTER — OFFICE VISIT (OUTPATIENT)
Dept: ENT CLINIC | Age: 67
End: 2022-09-26
Payer: MEDICARE

## 2022-09-26 VITALS — BODY MASS INDEX: 25.27 KG/M2 | HEIGHT: 64 IN | WEIGHT: 148 LBS

## 2022-09-26 DIAGNOSIS — H61.23 IMPACTED CERUMEN, BILATERAL: ICD-10-CM

## 2022-09-26 DIAGNOSIS — H93.232 HYPERACUSIS OF LEFT EAR: Primary | ICD-10-CM

## 2022-09-26 DIAGNOSIS — H90.3 SENSORINEURAL HEARING LOSS, BILATERAL: ICD-10-CM

## 2022-09-26 PROCEDURE — G8417 CALC BMI ABV UP PARAM F/U: HCPCS | Performed by: STUDENT IN AN ORGANIZED HEALTH CARE EDUCATION/TRAINING PROGRAM

## 2022-09-26 PROCEDURE — G8399 PT W/DXA RESULTS DOCUMENT: HCPCS | Performed by: STUDENT IN AN ORGANIZED HEALTH CARE EDUCATION/TRAINING PROGRAM

## 2022-09-26 PROCEDURE — 3017F COLORECTAL CA SCREEN DOC REV: CPT | Performed by: STUDENT IN AN ORGANIZED HEALTH CARE EDUCATION/TRAINING PROGRAM

## 2022-09-26 PROCEDURE — 1090F PRES/ABSN URINE INCON ASSESS: CPT | Performed by: STUDENT IN AN ORGANIZED HEALTH CARE EDUCATION/TRAINING PROGRAM

## 2022-09-26 PROCEDURE — 99213 OFFICE O/P EST LOW 20 MIN: CPT | Performed by: STUDENT IN AN ORGANIZED HEALTH CARE EDUCATION/TRAINING PROGRAM

## 2022-09-26 PROCEDURE — 69210 REMOVE IMPACTED EAR WAX UNI: CPT | Performed by: STUDENT IN AN ORGANIZED HEALTH CARE EDUCATION/TRAINING PROGRAM

## 2022-09-26 PROCEDURE — G8427 DOCREV CUR MEDS BY ELIG CLIN: HCPCS | Performed by: STUDENT IN AN ORGANIZED HEALTH CARE EDUCATION/TRAINING PROGRAM

## 2022-09-26 PROCEDURE — 1036F TOBACCO NON-USER: CPT | Performed by: STUDENT IN AN ORGANIZED HEALTH CARE EDUCATION/TRAINING PROGRAM

## 2022-09-26 PROCEDURE — 1123F ACP DISCUSS/DSCN MKR DOCD: CPT | Performed by: STUDENT IN AN ORGANIZED HEALTH CARE EDUCATION/TRAINING PROGRAM

## 2022-10-01 ENCOUNTER — PATIENT MESSAGE (OUTPATIENT)
Dept: INTERNAL MEDICINE CLINIC | Facility: CLINIC | Age: 67
End: 2022-10-01

## 2022-10-01 DIAGNOSIS — G43.009 MIGRAINE WITHOUT AURA AND WITHOUT STATUS MIGRAINOSUS, NOT INTRACTABLE: Primary | ICD-10-CM

## 2022-10-03 NOTE — TELEPHONE ENCOUNTER
From: Elena Espinosa  To: Dr. Castillo David: 10/1/2022 11:07 AM EDT  Subject: Rizatriptan    Hi! Please renew my prescription for Rizatriptan through [address]. Thanks!   Anca Law

## 2022-10-04 RX ORDER — RIZATRIPTAN BENZOATE 5 MG/1
5 TABLET, ORALLY DISINTEGRATING ORAL
Qty: 10 TABLET | Refills: 2 | Status: SHIPPED | OUTPATIENT
Start: 2022-10-04

## 2022-11-09 ENCOUNTER — PATIENT MESSAGE (OUTPATIENT)
Dept: INTERNAL MEDICINE CLINIC | Facility: CLINIC | Age: 67
End: 2022-11-09

## 2022-11-09 DIAGNOSIS — I10 ESSENTIAL HYPERTENSION: Primary | ICD-10-CM

## 2022-11-09 NOTE — TELEPHONE ENCOUNTER
From: Bruno Richards  To: Dr. Santana Kanner: 11/9/2022 1:08 PM EST  Subject: Atenolol 50 mg    Hello Dr. Asha Lunsford and Gurinder Holt,    Please renew my Rx with [address] for the subject Rx. Refills would be appreciated also, so they can just be autofilled.     Thanks,  Naseem Tay

## 2022-11-11 RX ORDER — ATENOLOL 50 MG/1
50 TABLET ORAL DAILY
Qty: 90 TABLET | Refills: 3 | Status: SHIPPED | OUTPATIENT
Start: 2022-11-11 | End: 2022-11-11 | Stop reason: SDUPTHER

## 2022-11-11 RX ORDER — ATENOLOL 50 MG/1
50 TABLET ORAL DAILY
Qty: 90 TABLET | Refills: 3 | Status: SHIPPED | OUTPATIENT
Start: 2022-11-11

## 2022-11-11 NOTE — TELEPHONE ENCOUNTER
Refills need to go to [facility] instead of [facility], please.

## 2022-11-27 ENCOUNTER — PATIENT MESSAGE (OUTPATIENT)
Dept: INTERNAL MEDICINE CLINIC | Facility: CLINIC | Age: 67
End: 2022-11-27

## 2022-11-27 DIAGNOSIS — G47.00 INSOMNIA, UNSPECIFIED TYPE: Primary | ICD-10-CM

## 2022-11-28 RX ORDER — TRAZODONE HYDROCHLORIDE 50 MG/1
TABLET ORAL
Qty: 90 TABLET | Refills: 1 | Status: SHIPPED | OUTPATIENT
Start: 2022-11-28

## 2022-11-28 NOTE — TELEPHONE ENCOUNTER
From: Mona Gaspar  To: Dr. Samantha Sims: 11/27/2022 4:16 PM EST  Subject: TRAZODONE 50MG    I hope you had a great Thanksgiving. Please call [facility] (not [facility]) to renew my Rx for TRAZODONE 50MG.     Thanks,  Shefali Lowe

## 2022-12-04 DIAGNOSIS — G47.00 INSOMNIA, UNSPECIFIED TYPE: ICD-10-CM

## 2022-12-05 RX ORDER — TRAZODONE HYDROCHLORIDE 50 MG/1
TABLET ORAL
Qty: 90 TABLET | Refills: 1 | OUTPATIENT
Start: 2022-12-05

## 2022-12-21 ENCOUNTER — NURSE ONLY (OUTPATIENT)
Dept: INTERNAL MEDICINE CLINIC | Facility: CLINIC | Age: 67
End: 2022-12-21
Payer: MEDICARE

## 2022-12-21 DIAGNOSIS — R39.9 UTI SYMPTOMS: Primary | ICD-10-CM

## 2022-12-21 LAB
BILIRUBIN, URINE, POC: NEGATIVE
BLOOD URINE, POC: NEGATIVE
GLUCOSE URINE, POC: NEGATIVE
KETONES, URINE, POC: NEGATIVE
LEUKOCYTE ESTERASE, URINE, POC: NEGATIVE
NITRITE, URINE, POC: POSITIVE
PH, URINE, POC: 7 (ref 4.6–8)
PROTEIN,URINE, POC: NEGATIVE
SPECIFIC GRAVITY, URINE, POC: 1.02 (ref 1–1.03)
URINALYSIS CLARITY, POC: CLEAR
URINALYSIS COLOR, POC: NORMAL
UROBILINOGEN, POC: 0.2

## 2022-12-21 PROCEDURE — 81003 URINALYSIS AUTO W/O SCOPE: CPT | Performed by: INTERNAL MEDICINE

## 2022-12-21 RX ORDER — NITROFURANTOIN 25; 75 MG/1; MG/1
100 CAPSULE ORAL 2 TIMES DAILY
Qty: 20 CAPSULE | Refills: 0 | Status: SHIPPED | OUTPATIENT
Start: 2022-12-21 | End: 2022-12-31

## 2023-01-16 DIAGNOSIS — I10 ESSENTIAL HYPERTENSION: Primary | ICD-10-CM

## 2023-01-16 DIAGNOSIS — M85.89 OSTEOPENIA OF MULTIPLE SITES: ICD-10-CM

## 2023-01-16 DIAGNOSIS — E78.2 MIXED HYPERLIPIDEMIA: ICD-10-CM

## 2023-01-16 DIAGNOSIS — I10 ESSENTIAL HYPERTENSION: ICD-10-CM

## 2023-01-16 LAB
APPEARANCE UR: NORMAL
BACTERIA URNS QL MICRO: NEGATIVE /HPF
BILIRUB UR QL: NEGATIVE
CASTS URNS QL MICRO: NORMAL /LPF (ref 0–2)
COLOR UR: NORMAL
EPI CELLS #/AREA URNS HPF: NORMAL /HPF (ref 0–5)
GLUCOSE UR STRIP.AUTO-MCNC: NEGATIVE MG/DL
HGB UR QL STRIP: NEGATIVE
KETONES UR QL STRIP.AUTO: NEGATIVE MG/DL
LEUKOCYTE ESTERASE UR QL STRIP.AUTO: NEGATIVE
MUCOUS THREADS URNS QL MICRO: 0 /LPF
NITRITE UR QL STRIP.AUTO: NEGATIVE
PH UR STRIP: 6.5 (ref 5–9)
PROT UR STRIP-MCNC: NEGATIVE MG/DL
RBC #/AREA URNS HPF: NORMAL /HPF (ref 0–5)
SP GR UR REFRACTOMETRY: 1.01 (ref 1–1.02)
URINE CULTURE IF INDICATED: NORMAL
UROBILINOGEN UR QL STRIP.AUTO: 0.2 EU/DL (ref 0.2–1)
WBC URNS QL MICRO: NORMAL /HPF (ref 0–4)

## 2023-01-17 LAB
25(OH)D3 SERPL-MCNC: 40.7 NG/ML (ref 30–100)
ALBUMIN SERPL-MCNC: 3.7 G/DL (ref 3.2–4.6)
ALBUMIN/GLOB SERPL: 1.2 (ref 0.4–1.6)
ALP SERPL-CCNC: 54 U/L (ref 50–136)
ALT SERPL-CCNC: 23 U/L (ref 12–65)
ANION GAP SERPL CALC-SCNC: 5 MMOL/L (ref 2–11)
AST SERPL-CCNC: 13 U/L (ref 15–37)
BASOPHILS # BLD: 0 K/UL (ref 0–0.2)
BASOPHILS NFR BLD: 1 % (ref 0–2)
BILIRUB SERPL-MCNC: 0.8 MG/DL (ref 0.2–1.1)
BUN SERPL-MCNC: 18 MG/DL (ref 8–23)
CALCIUM SERPL-MCNC: 9.4 MG/DL (ref 8.3–10.4)
CHLORIDE SERPL-SCNC: 104 MMOL/L (ref 101–110)
CHOLEST SERPL-MCNC: 233 MG/DL
CO2 SERPL-SCNC: 30 MMOL/L (ref 21–32)
CREAT SERPL-MCNC: 0.7 MG/DL (ref 0.6–1)
DIFFERENTIAL METHOD BLD: NORMAL
EOSINOPHIL # BLD: 0.1 K/UL (ref 0–0.8)
EOSINOPHIL NFR BLD: 2 % (ref 0.5–7.8)
ERYTHROCYTE [DISTWIDTH] IN BLOOD BY AUTOMATED COUNT: 13.6 % (ref 11.9–14.6)
GLOBULIN SER CALC-MCNC: 3.2 G/DL (ref 2.8–4.5)
GLUCOSE SERPL-MCNC: 93 MG/DL (ref 65–100)
HCT VFR BLD AUTO: 43.3 % (ref 35.8–46.3)
HDLC SERPL-MCNC: 88 MG/DL (ref 40–60)
HDLC SERPL: 2.6
HGB BLD-MCNC: 13.7 G/DL (ref 11.7–15.4)
IMM GRANULOCYTES # BLD AUTO: 0 K/UL (ref 0–0.5)
IMM GRANULOCYTES NFR BLD AUTO: 0 % (ref 0–5)
LDLC SERPL CALC-MCNC: 135.4 MG/DL
LYMPHOCYTES # BLD: 2.2 K/UL (ref 0.5–4.6)
LYMPHOCYTES NFR BLD: 35 % (ref 13–44)
MCH RBC QN AUTO: 31.2 PG (ref 26.1–32.9)
MCHC RBC AUTO-ENTMCNC: 31.6 G/DL (ref 31.4–35)
MCV RBC AUTO: 98.6 FL (ref 82–102)
MONOCYTES # BLD: 0.7 K/UL (ref 0.1–1.3)
MONOCYTES NFR BLD: 11 % (ref 4–12)
NEUTS SEG # BLD: 3.2 K/UL (ref 1.7–8.2)
NEUTS SEG NFR BLD: 51 % (ref 43–78)
NRBC # BLD: 0 K/UL (ref 0–0.2)
PLATELET # BLD AUTO: 256 K/UL (ref 150–450)
PMV BLD AUTO: 11.7 FL (ref 9.4–12.3)
POTASSIUM SERPL-SCNC: 4.4 MMOL/L (ref 3.5–5.1)
PROT SERPL-MCNC: 6.9 G/DL (ref 6.3–8.2)
RBC # BLD AUTO: 4.39 M/UL (ref 4.05–5.2)
SODIUM SERPL-SCNC: 139 MMOL/L (ref 133–143)
T4 FREE SERPL-MCNC: 0.8 NG/DL (ref 0.78–1.46)
TRIGL SERPL-MCNC: 48 MG/DL (ref 35–150)
TSH, 3RD GENERATION: 1.31 UIU/ML (ref 0.36–3.74)
VLDLC SERPL CALC-MCNC: 9.6 MG/DL (ref 6–23)
WBC # BLD AUTO: 6.2 K/UL (ref 4.3–11.1)

## 2023-01-23 ENCOUNTER — OFFICE VISIT (OUTPATIENT)
Dept: INTERNAL MEDICINE CLINIC | Facility: CLINIC | Age: 68
End: 2023-01-23
Payer: MEDICARE

## 2023-01-23 VITALS
OXYGEN SATURATION: 99 % | TEMPERATURE: 98.3 F | DIASTOLIC BLOOD PRESSURE: 56 MMHG | WEIGHT: 147.8 LBS | SYSTOLIC BLOOD PRESSURE: 124 MMHG | HEIGHT: 64 IN | HEART RATE: 54 BPM | BODY MASS INDEX: 25.23 KG/M2 | RESPIRATION RATE: 16 BRPM

## 2023-01-23 DIAGNOSIS — Z78.0 MENOPAUSE: ICD-10-CM

## 2023-01-23 DIAGNOSIS — K21.9 GASTROESOPHAGEAL REFLUX DISEASE WITHOUT ESOPHAGITIS: ICD-10-CM

## 2023-01-23 DIAGNOSIS — Z00.00 MEDICARE ANNUAL WELLNESS VISIT, SUBSEQUENT: Primary | ICD-10-CM

## 2023-01-23 DIAGNOSIS — E78.2 MIXED HYPERLIPIDEMIA: ICD-10-CM

## 2023-01-23 DIAGNOSIS — Z12.31 VISIT FOR SCREENING MAMMOGRAM: ICD-10-CM

## 2023-01-23 DIAGNOSIS — G43.009 MIGRAINE WITHOUT AURA AND WITHOUT STATUS MIGRAINOSUS, NOT INTRACTABLE: ICD-10-CM

## 2023-01-23 DIAGNOSIS — M85.89 OSTEOPENIA OF MULTIPLE SITES: ICD-10-CM

## 2023-01-23 DIAGNOSIS — F32.A ANXIETY AND DEPRESSION: ICD-10-CM

## 2023-01-23 DIAGNOSIS — I10 ESSENTIAL HYPERTENSION: ICD-10-CM

## 2023-01-23 DIAGNOSIS — J30.1 SEASONAL ALLERGIC RHINITIS DUE TO POLLEN: ICD-10-CM

## 2023-01-23 DIAGNOSIS — F41.9 ANXIETY AND DEPRESSION: ICD-10-CM

## 2023-01-23 PROCEDURE — 3074F SYST BP LT 130 MM HG: CPT | Performed by: INTERNAL MEDICINE

## 2023-01-23 PROCEDURE — 1123F ACP DISCUSS/DSCN MKR DOCD: CPT | Performed by: INTERNAL MEDICINE

## 2023-01-23 PROCEDURE — 1036F TOBACCO NON-USER: CPT | Performed by: INTERNAL MEDICINE

## 2023-01-23 PROCEDURE — 99213 OFFICE O/P EST LOW 20 MIN: CPT | Performed by: INTERNAL MEDICINE

## 2023-01-23 PROCEDURE — 1090F PRES/ABSN URINE INCON ASSESS: CPT | Performed by: INTERNAL MEDICINE

## 2023-01-23 PROCEDURE — 3078F DIAST BP <80 MM HG: CPT | Performed by: INTERNAL MEDICINE

## 2023-01-23 PROCEDURE — G8484 FLU IMMUNIZE NO ADMIN: HCPCS | Performed by: INTERNAL MEDICINE

## 2023-01-23 PROCEDURE — G0439 PPPS, SUBSEQ VISIT: HCPCS | Performed by: INTERNAL MEDICINE

## 2023-01-23 PROCEDURE — G8427 DOCREV CUR MEDS BY ELIG CLIN: HCPCS | Performed by: INTERNAL MEDICINE

## 2023-01-23 PROCEDURE — G8417 CALC BMI ABV UP PARAM F/U: HCPCS | Performed by: INTERNAL MEDICINE

## 2023-01-23 PROCEDURE — G8399 PT W/DXA RESULTS DOCUMENT: HCPCS | Performed by: INTERNAL MEDICINE

## 2023-01-23 PROCEDURE — 3017F COLORECTAL CA SCREEN DOC REV: CPT | Performed by: INTERNAL MEDICINE

## 2023-01-23 RX ORDER — VALACYCLOVIR HYDROCHLORIDE 1 G/1
1 TABLET, FILM COATED ORAL 2 TIMES DAILY
COMMUNITY
Start: 2023-01-18

## 2023-01-23 RX ORDER — RIZATRIPTAN BENZOATE 5 MG/1
5 TABLET, ORALLY DISINTEGRATING ORAL
Qty: 10 TABLET | Refills: 2 | Status: CANCELLED | OUTPATIENT
Start: 2023-01-23

## 2023-01-23 RX ORDER — RIZATRIPTAN BENZOATE 10 MG/1
10 TABLET, ORALLY DISINTEGRATING ORAL
Qty: 12 TABLET | Refills: 2 | Status: SHIPPED | OUTPATIENT
Start: 2023-01-23

## 2023-01-23 ASSESSMENT — ENCOUNTER SYMPTOMS
SINUS PRESSURE: 0
CHEST TIGHTNESS: 0
ABDOMINAL PAIN: 0
DIARRHEA: 0
COUGH: 0
NAUSEA: 0
VOMITING: 0
BLOOD IN STOOL: 0
SHORTNESS OF BREATH: 0
RHINORRHEA: 0

## 2023-01-23 ASSESSMENT — PATIENT HEALTH QUESTIONNAIRE - PHQ9
SUM OF ALL RESPONSES TO PHQ QUESTIONS 1-9: 0
SUM OF ALL RESPONSES TO PHQ QUESTIONS 1-9: 0
SUM OF ALL RESPONSES TO PHQ9 QUESTIONS 1 & 2: 0
1. LITTLE INTEREST OR PLEASURE IN DOING THINGS: 0
SUM OF ALL RESPONSES TO PHQ QUESTIONS 1-9: 0
2. FEELING DOWN, DEPRESSED OR HOPELESS: 0
SUM OF ALL RESPONSES TO PHQ QUESTIONS 1-9: 0

## 2023-01-23 ASSESSMENT — LIFESTYLE VARIABLES
HOW MANY STANDARD DRINKS CONTAINING ALCOHOL DO YOU HAVE ON A TYPICAL DAY: 1 OR 2
HOW OFTEN DURING THE LAST YEAR HAVE YOU FAILED TO DO WHAT WAS NORMALLY EXPECTED FROM YOU BECAUSE OF DRINKING: 0
HOW OFTEN DO YOU HAVE A DRINK CONTAINING ALCOHOL: 4 OR MORE TIMES A WEEK
HOW OFTEN DURING THE LAST YEAR HAVE YOU BEEN UNABLE TO REMEMBER WHAT HAPPENED THE NIGHT BEFORE BECAUSE YOU HAD BEEN DRINKING: 0
HAS A RELATIVE, FRIEND, DOCTOR, OR ANOTHER HEALTH PROFESSIONAL EXPRESSED CONCERN ABOUT YOUR DRINKING OR SUGGESTED YOU CUT DOWN: 0
HAVE YOU OR SOMEONE ELSE BEEN INJURED AS A RESULT OF YOUR DRINKING: 0
HOW OFTEN DURING THE LAST YEAR HAVE YOU FOUND THAT YOU WERE NOT ABLE TO STOP DRINKING ONCE YOU HAD STARTED: 0
HOW OFTEN DURING THE LAST YEAR HAVE YOU NEEDED AN ALCOHOLIC DRINK FIRST THING IN THE MORNING TO GET YOURSELF GOING AFTER A NIGHT OF HEAVY DRINKING: 0
HOW OFTEN DURING THE LAST YEAR HAVE YOU HAD A FEELING OF GUILT OR REMORSE AFTER DRINKING: 0

## 2023-01-23 NOTE — PROGRESS NOTES
[facility]      2023    Patient Name: Monna Rubinstein  :  1955    Subjective:    Chief Complaint:  Chief Complaint   Patient presents with    Medicare AWV     Pt is here for Medicare Wellness and to review labs. Medication Adjustment     Pt would like to discuss adjusting her Rizatriptan. HPI Here for Medicare Wellness visit; patient had fasting labs done recently and results were reviewed in detail today; She generally follows a healthy diet; she plans to be more active once weather warms up; She has migraine headaches, had one several weeks ago while on vacation in [place]; Mammogram: 2022  Pap smear: s/p hysterectomy  Bone density: 10/2020, osteopenia  Colonoscopy:   Eye exam:   Dental exam:   Pneumovax:   Prevnar 13:   Shingrix:   Tdap: 2016  Fluvax: 10/2022  Moderna Covid 19: , , 10/26/2021  Moderna booster: 2022  HTN:  Patient compliant with taking blood pressure medications: Yes  Discussed importance of following low sodium DASH diet, increasing physical activity, taking medications as ordered, decreasing alcohol intake, keeping f/u visits to recheck blood pressure, monitoring blood pressure at home and keeping a log, with goal blood pressure <140/90.   Home bp readings are: does not monitor      Past Medical History:   Diagnosis Date    Anxiety and depression     GERD (gastroesophageal reflux disease)     HTN (hypertension)     Hyperlipidemia     Menopause     Migraine     Osteopenia     Seasonal allergies        Past Surgical History:   Procedure Laterality Date    BREAST BIOPSY Right     BUNIONECTOMY Left     COLONOSCOPY  2017    DERMOID CYST  EXCISION  1984    from ovary    HYSTERECTOMY (CERVIX STATUS UNKNOWN)  2005    ORTHOPEDIC SURGERY Bilateral 2016    thumb    SHOULDER ARTHROSCOPY Right 2017    SINUS SURGERY      TONSILLECTOMY         Family History   Problem Relation Age of Onset    Stroke Mother     Heart Disease Mother     Stroke Father     Prostate Cancer Father     Dementia Sister     Hypertension Brother        Social History     Tobacco Use    Smoking status: Former     Packs/day: 0.50     Years: 22.00     Pack years: 11.00     Types: Cigarettes     Quit date:      Years since quittin.0     Passive exposure: Never    Smokeless tobacco: Never   Vaping Use    Vaping Use: Never used   Substance Use Topics    Alcohol use: Yes     Alcohol/week: 14.0 standard drinks     Types: 14 Glasses of wine per week    Drug use: Never                 Current Outpatient Medications:     valACYclovir (VALTREX) 1 g tablet, Take 1 tablet by mouth 2 times daily, Disp: , Rfl:     rizatriptan (MAXALT-MLT) 10 MG disintegrating tablet, Take 1 tablet by mouth every 2 hours as needed for Migraine do not exceed 30 mg in 24 hours, Disp: 12 tablet, Rfl: 2    traZODone (DESYREL) 50 MG tablet, TAKE 1 TABLET BY MOUTH EVERY DAY AT NIGHT, Disp: 90 tablet, Rfl: 1    atenolol (TENORMIN) 50 MG tablet, Take 1 tablet by mouth daily, Disp: 90 tablet, Rfl: 3    omeprazole (PRILOSEC) 20 MG delayed release capsule, Take 1 capsule by mouth daily, Disp: 90 capsule, Rfl: 1    estradiol (ESTRACE) 0.1 MG/GM vaginal cream, Place 2 g vaginally in the morning. INSERT 2 G INTO VAGINA DAILY. ., Disp: 42.5 g, Rfl: 0    TURMERIC PO, Take by mouth, Disp: , Rfl:     Biotin 5 MG TBDP, Take by mouth, Disp: , Rfl:     busPIRone (BUSPAR) 5 MG tablet, Take 5 mg by mouth 2 times daily as needed, Disp: , Rfl:     Calcium Carb-Cholecalciferol 600-400 MG-UNIT CAPS, Take by mouth, Disp: , Rfl:     calcium citrate-vitamin D (CITRACAL+D) 315-200 MG-UNIT per tablet, Take 1 tablet by mouth daily (with breakfast), Disp: , Rfl:     escitalopram (LEXAPRO) 20 MG tablet, TAKE 1 TABLET EVERY DAY, Disp: , Rfl:     fluticasone (FLONASE) 50 MCG/ACT nasal spray, 2 sprays by Nasal route daily, Disp: , Rfl:     L-Lysine 1000 MG TABS, Take by mouth, Disp: , Rfl:     naproxen (NAPROSYN) 500 MG tablet, Take 500 mg by mouth 2 times daily (with meals), Disp: , Rfl:     polyethylene glycol (GLYCOLAX) 17 GM/SCOOP powder, Take 17 g by mouth daily, Disp: , Rfl:     Allergies   Allergen Reactions    Codeine Other (See Comments)     Chest pain       Review of Systems   Constitutional:  Negative for chills, fatigue and fever. HENT:  Negative for congestion, postnasal drip, rhinorrhea and sinus pressure. Eyes:  Negative for visual disturbance. Respiratory:  Negative for cough, chest tightness and shortness of breath. Cardiovascular:  Negative for chest pain and palpitations. Gastrointestinal:  Negative for abdominal pain, blood in stool, diarrhea, nausea and vomiting. Genitourinary:  Negative for dysuria, frequency and urgency. Musculoskeletal:  Negative for arthralgias and myalgias. Skin: Negative. Neurological:  Negative for numbness and headaches. Psychiatric/Behavioral:  Negative for dysphoric mood and sleep disturbance. The patient is not nervous/anxious. Objective:  BP (!) 124/56   Pulse 54   Temp 98.3 °F (36.8 °C) (Temporal)   Resp 16   Ht 5' 4\" (1.626 m)   Wt 147 lb 12.8 oz (67 kg)   SpO2 99%   BMI 25.37 kg/m²     Examination:  Physical Exam  Constitutional:       Appearance: Normal appearance. HENT:      Head: Normocephalic and atraumatic. Right Ear: Tympanic membrane and ear canal normal.      Left Ear: Tympanic membrane and ear canal normal.      Nose: Nose normal.      Mouth/Throat:      Mouth: Mucous membranes are moist.   Eyes:      Extraocular Movements: Extraocular movements intact. Conjunctiva/sclera: Conjunctivae normal.      Pupils: Pupils are equal, round, and reactive to light. Cardiovascular:      Rate and Rhythm: Normal rate and regular rhythm. Pulses: Normal pulses. Heart sounds: Normal heart sounds. Pulmonary:      Effort: Pulmonary effort is normal.      Breath sounds: Normal breath sounds.    Abdominal:      General: Abdomen is flat. Bowel sounds are normal.      Palpations: Abdomen is soft. Musculoskeletal:      Cervical back: Normal range of motion and neck supple. Skin:     General: Skin is warm and dry. Neurological:      General: No focal deficit present. Mental Status: She is alert. Mental status is at baseline. Psychiatric:         Mood and Affect: Mood normal.         Thought Content: Thought content normal.         Assessment/Plan:    Blaine Hdez was seen today for medicare awv and medication adjustment. Diagnoses and all orders for this visit:    Medicare annual wellness visit, subsequent  Annual Exam: Goals for good health were addressed at today's visit:  -Reach and stay at a healthy weight. This can lower your risk of obesity, diabetes, heart disease and high blood pressure. -Get at least 30 minutes of physical activity daily. Walking, running, swimming, cycling or playing sports are good options.  -Do not smoke or allow others to smoke around you. -Use birth control if you do not want to have children at this time and barrier contraception to prevent or decrease risk of exposure to sexually transmitted diseases. -Use sunscreen daily, SPF of 15 or higher are best.  -See a dentist at least once yearly for checkups. -Have vision checked every 1-2 years.  -Wear a seat belt in the car.  -Avoid drinking in excess of 2 alcoholic drinks per day for men and 1 drink a day for women, or avoid drinking altogether.   -Watch closely for changes in our health and contact your doctor with any concerning problems or symptoms  -Age appropriate screening tests were updated and discussed at today's visit. -Immunization history was reviewed and updated at today's visit. Migraine without aura and without status migrainosus, not intractable  Instructed to take medications as prescribed, and to call if no improvement in symptoms.  Will increase to Maxalt MLT 10 mg from 5 mg; discussed avoidance of migraine triggers, getting adequate rest, etc, instructed to call if worsening symptoms;     -     rizatriptan (MAXALT-MLT) 10 MG disintegrating tablet; Take 1 tablet by mouth every 2 hours as needed for Migraine do not exceed 30 mg in 24 hours    Essential hypertension  Recommended low sodium diet; Goal: take meds as prescribed, monitor blood pressure at home and call with readings. Seasonal allergic rhinitis due to pollen  Stable on present medications, continue as prescribed. Gastroesophageal reflux disease without esophagitis  Stable, continue medication, diet. Osteopenia of multiple sites  Bone density revealed osteopenia, recommend taking daily calcium and vit D supplement otc, weight bearing exercises. -     DEXA BONE DENSITY AXIAL SKELETON; Future    Mixed hyperlipidemia  Recommended low fat, low cholesterol diet, regular exercise; Anxiety and depression  Stable on present medications, continue as prescribed. Menopause  Stable on present medications, continue as prescribed. Visit for screening mammogram  -     IBRAHIMA DIGITAL SCREEN W OR WO CAD BILATERAL; Future        Follow-up and Dispositions    Return for Medicare Annual Wellness Visit in 1 year. Medication Reconciliation:  Current Medications Verified: Current medications/ immunizations were reviewed, including purpose, with patient. Family History, Social History, Current and Past Medical History was reviewed with patient and updated at today's office visit. Medication Reconciliation list was given to patient/ family. Patient was advised to discard old medication lists and provide all providers with current list at each visit and carry list with them in case of emergency.     Completed By:   Nicholas Goel MD    [facility] & [address], [address], [address]  [phone]  [phone] fax  10:19 AMMedicare Annual Wellness Visit    Sofía Zuleta is here for Medicare AWV (Pt is here for Medicare Wellness and to review labs. ) and Medication Adjustment (Pt would like to discuss adjusting her Rizatriptan. )    Assessment & Plan   Medicare annual wellness visit, subsequent  Migraine without aura and without status migrainosus, not intractable  -     rizatriptan (MAXALT-MLT) 10 MG disintegrating tablet; Take 1 tablet by mouth every 2 hours as needed for Migraine do not exceed 30 mg in 24 hours, Disp-12 tablet, R-2Normal  Essential hypertension  Seasonal allergic rhinitis due to pollen  Gastroesophageal reflux disease without esophagitis  Osteopenia of multiple sites  -     DEXA BONE DENSITY AXIAL SKELETON; Future  Mixed hyperlipidemia  Anxiety and depression  Menopause  Visit for screening mammogram  -     IBRAHIMA DIGITAL SCREEN W OR WO CAD BILATERAL; Future      Recommendations for Preventive Services Due: see orders and patient instructions/AVS.  Recommended screening schedule for the next 5-10 years is provided to the patient in written form: see Patient Instructions/AVS.     Return for Medicare Annual Wellness Visit in 1 year. Subjective       Patient's complete Health Risk Assessment and screening values have been reviewed and are found in Flowsheets. The following problems were reviewed today and where indicated follow up appointments were made and/or referrals ordered.     Positive Risk Factor Screenings with Interventions:                 Weight and Activity:  Physical Activity: Inactive    Days of Exercise per Week: 0 days    Minutes of Exercise per Session: 0 min     On average, how many days per week do you engage in moderate to strenuous exercise (like a brisk walk)?: 0 days  Have you lost any weight without trying in the past 3 months?: No  Body mass index: (!) 25.37      Inactivity Interventions:  Recommendations: patient agrees to exercise for at least 150 minutes/week         Safety:  Do you have any tripping hazards - loose or unsecured carpets or rugs?: (!) Yes  Do you have either shower bars, grab bars, non-slip mats or non-slip surfaces in your shower or bathtub?: (!) No  Interventions:  Patient declined any further interventions or treatment                     Objective   Vitals:    01/23/23 0924   BP: (!) 124/56   Pulse: 54   Resp: 16   Temp: 98.3 °F (36.8 °C)   TempSrc: Temporal   SpO2: 99%   Weight: 147 lb 12.8 oz (67 kg)   Height: 5' 4\" (1.626 m)      Body mass index is 25.37 kg/m². Allergies   Allergen Reactions    Codeine Other (See Comments)     Chest pain     Prior to Visit Medications    Medication Sig Taking? Authorizing Provider   valACYclovir (VALTREX) 1 g tablet Take 1 tablet by mouth 2 times daily Yes Historical Provider, MD   rizatriptan (MAXALT-MLT) 10 MG disintegrating tablet Take 1 tablet by mouth every 2 hours as needed for Migraine do not exceed 30 mg in 24 hours Yes Rasta Pugh MD   traZODone (DESYREL) 50 MG tablet TAKE 1 TABLET BY MOUTH EVERY DAY AT NIGHT Yes Rasta Pugh MD   atenolol (TENORMIN) 50 MG tablet Take 1 tablet by mouth daily Yes Rasta Pugh MD   omeprazole (PRILOSEC) 20 MG delayed release capsule Take 1 capsule by mouth daily Yes Rasta Pugh MD   estradiol (ESTRACE) 0.1 MG/GM vaginal cream Place 2 g vaginally in the morning. INSERT 2 G INTO VAGINA DAILY. Sorin Barron  Yes Rasta Pugh MD   TURMERIC PO Take by mouth Yes Ar Automatic Reconciliation   Biotin 5 MG TBDP Take by mouth Yes Ar Automatic Reconciliation   busPIRone (BUSPAR) 5 MG tablet Take 5 mg by mouth 2 times daily as needed Yes Ar Automatic Reconciliation   Calcium Carb-Cholecalciferol 600-400 MG-UNIT CAPS Take by mouth Yes Ar Automatic Reconciliation   calcium citrate-vitamin D (CITRACAL+D) 315-200 MG-UNIT per tablet Take 1 tablet by mouth daily (with breakfast) Yes Ar Automatic Reconciliation   escitalopram (LEXAPRO) 20 MG tablet TAKE 1 TABLET EVERY DAY Yes Ar Automatic Reconciliation   fluticasone (FLONASE) 50 MCG/ACT nasal spray 2 sprays by Nasal route daily Yes Ar Automatic Reconciliation   L-Lysine 1000 MG TABS Take by mouth Yes Ar Automatic Reconciliation   naproxen (NAPROSYN) 500 MG tablet Take 500 mg by mouth 2 times daily (with meals) Yes Ar Automatic Reconciliation   polyethylene glycol (GLYCOLAX) 17 GM/SCOOP powder Take 17 g by mouth daily Yes Ar Automatic Reconciliation       CareTeam (Including outside providers/suppliers regularly involved in providing care):   Patient Care Team:  Sepideh Aden MD as PCP - Chadd Tidwell MD as PCP - [facility] Empaneled Provider  Micah Bustamante MD as Physician  Shahid Cao DO as Physician     Reviewed and updated this visit:  Tobacco  Allergies  Meds  Problems  Med Hx  Surg Hx  Soc Hx  Fam Hx

## 2023-01-23 NOTE — PATIENT INSTRUCTIONS

## 2023-02-06 ENCOUNTER — OFFICE VISIT (OUTPATIENT)
Dept: INTERNAL MEDICINE CLINIC | Facility: CLINIC | Age: 68
End: 2023-02-06
Payer: MEDICARE

## 2023-02-06 ENCOUNTER — HOSPITAL ENCOUNTER (OUTPATIENT)
Dept: GENERAL RADIOLOGY | Age: 68
Discharge: HOME | End: 2023-02-09
Payer: MEDICARE

## 2023-02-06 VITALS
HEART RATE: 54 BPM | WEIGHT: 147 LBS | RESPIRATION RATE: 16 BRPM | OXYGEN SATURATION: 99 % | BODY MASS INDEX: 25.1 KG/M2 | HEIGHT: 64 IN | TEMPERATURE: 97 F | SYSTOLIC BLOOD PRESSURE: 122 MMHG | DIASTOLIC BLOOD PRESSURE: 70 MMHG

## 2023-02-06 DIAGNOSIS — M25.571 ACUTE RIGHT ANKLE PAIN: ICD-10-CM

## 2023-02-06 DIAGNOSIS — M25.571 ACUTE RIGHT ANKLE PAIN: Primary | ICD-10-CM

## 2023-02-06 PROCEDURE — 1123F ACP DISCUSS/DSCN MKR DOCD: CPT | Performed by: NURSE PRACTITIONER

## 2023-02-06 PROCEDURE — G8417 CALC BMI ABV UP PARAM F/U: HCPCS | Performed by: NURSE PRACTITIONER

## 2023-02-06 PROCEDURE — 99213 OFFICE O/P EST LOW 20 MIN: CPT | Performed by: NURSE PRACTITIONER

## 2023-02-06 PROCEDURE — 3078F DIAST BP <80 MM HG: CPT | Performed by: NURSE PRACTITIONER

## 2023-02-06 PROCEDURE — 1090F PRES/ABSN URINE INCON ASSESS: CPT | Performed by: NURSE PRACTITIONER

## 2023-02-06 PROCEDURE — 3017F COLORECTAL CA SCREEN DOC REV: CPT | Performed by: NURSE PRACTITIONER

## 2023-02-06 PROCEDURE — G8484 FLU IMMUNIZE NO ADMIN: HCPCS | Performed by: NURSE PRACTITIONER

## 2023-02-06 PROCEDURE — 3074F SYST BP LT 130 MM HG: CPT | Performed by: NURSE PRACTITIONER

## 2023-02-06 PROCEDURE — G8427 DOCREV CUR MEDS BY ELIG CLIN: HCPCS | Performed by: NURSE PRACTITIONER

## 2023-02-06 PROCEDURE — G8399 PT W/DXA RESULTS DOCUMENT: HCPCS | Performed by: NURSE PRACTITIONER

## 2023-02-06 PROCEDURE — 73610 X-RAY EXAM OF ANKLE: CPT

## 2023-02-06 PROCEDURE — 1036F TOBACCO NON-USER: CPT | Performed by: NURSE PRACTITIONER

## 2023-02-06 RX ORDER — MELOXICAM 15 MG/1
15 TABLET ORAL DAILY PRN
Qty: 30 TABLET | Refills: 0 | Status: SHIPPED | OUTPATIENT
Start: 2023-02-06

## 2023-02-06 SDOH — ECONOMIC STABILITY: HOUSING INSECURITY
IN THE LAST 12 MONTHS, WAS THERE A TIME WHEN YOU DID NOT HAVE A STEADY PLACE TO SLEEP OR SLEPT IN A SHELTER (INCLUDING NOW)?: NO

## 2023-02-06 SDOH — ECONOMIC STABILITY: FOOD INSECURITY: WITHIN THE PAST 12 MONTHS, THE FOOD YOU BOUGHT JUST DIDN'T LAST AND YOU DIDN'T HAVE MONEY TO GET MORE.: NEVER TRUE

## 2023-02-06 SDOH — ECONOMIC STABILITY: INCOME INSECURITY: HOW HARD IS IT FOR YOU TO PAY FOR THE VERY BASICS LIKE FOOD, HOUSING, MEDICAL CARE, AND HEATING?: NOT HARD AT ALL

## 2023-02-06 SDOH — ECONOMIC STABILITY: FOOD INSECURITY: WITHIN THE PAST 12 MONTHS, YOU WORRIED THAT YOUR FOOD WOULD RUN OUT BEFORE YOU GOT MONEY TO BUY MORE.: NEVER TRUE

## 2023-02-06 ASSESSMENT — PATIENT HEALTH QUESTIONNAIRE - PHQ9
SUM OF ALL RESPONSES TO PHQ QUESTIONS 1-9: 0
2. FEELING DOWN, DEPRESSED OR HOPELESS: 0
SUM OF ALL RESPONSES TO PHQ9 QUESTIONS 1 & 2: 0
SUM OF ALL RESPONSES TO PHQ QUESTIONS 1-9: 0
1. LITTLE INTEREST OR PLEASURE IN DOING THINGS: 0

## 2023-02-06 ASSESSMENT — ENCOUNTER SYMPTOMS: COLOR CHANGE: 0

## 2023-02-06 NOTE — PROGRESS NOTES
[facility]      2023    Patient Name: Manuel Sheehan  :  [id number]      Chief Complaint:  Chief Complaint   Patient presents with    Joint Pain    Ankle Pain     Right-Can barely walk on it         HPI  Patient presents today with complaint of right ankle pain. Symptoms started about 3 years ago. Symptoms have previously been intermittent, \"very occasionally\". No known injury mechanism. She describes the pain as very sharp and is present to the medial aspect of the right ankle. Symptoms have occurred rarely in the past, lasted a few hours to a day and then resolved. However, symptoms have been occurring more frequently over the past month and have been constant for the past 4 days. She denies any associated swelling, erythema or warmth. The pain is aggravated by weight bearing and is worst in the morning upon getting out of bed. She has been taking naproxen 500 mg daily but denies much improvement.            Past Medical History:   Diagnosis Date    Anxiety and depression     GERD (gastroesophageal reflux disease)     HTN (hypertension)     Hyperlipidemia     Menopause     Migraine     Osteopenia     Seasonal allergies        Past Surgical History:   Procedure Laterality Date    BREAST BIOPSY Right     BUNIONECTOMY Left     COLONOSCOPY      due for repeat     DERMOID CYST  EXCISION  1984    from ovary    HYSTERECTOMY (CERVIX STATUS UNKNOWN)      ORTHOPEDIC SURGERY Bilateral 2016    thumb    SHOULDER ARTHROSCOPY Right 2017    SINUS SURGERY      TONSILLECTOMY         Family History   Problem Relation Age of Onset    Stroke Mother     Heart Disease Mother     Stroke Father     Prostate Cancer Father     Dementia Sister     Hypertension Brother        Social History     Tobacco Use    Smoking status: Former     Packs/day: 0.50     Years: 22.00     Pack years: 11.00     Types: Cigarettes     Quit date:      Years since quittin.1     Passive exposure: Never    Smokeless tobacco: Never   Vaping Use    Vaping Use: Never used   Substance Use Topics    Alcohol use: Yes     Alcohol/week: 14.0 standard drinks     Types: 14 Glasses of wine per week    Drug use: Never       Current Outpatient Medications:     Multiple Vitamins-Minerals (MULTIVITAMIN ADULT EXTRA C PO), Take by mouth, Disp: , Rfl:     meloxicam (MOBIC) 15 MG tablet, Take 1 tablet by mouth daily as needed for Pain, Disp: 30 tablet, Rfl: 0    valACYclovir (VALTREX) 1 g tablet, Take 1 tablet by mouth 2 times daily, Disp: , Rfl:     rizatriptan (MAXALT-MLT) 10 MG disintegrating tablet, Take 1 tablet by mouth every 2 hours as needed for Migraine do not exceed 30 mg in 24 hours, Disp: 12 tablet, Rfl: 2    traZODone (DESYREL) 50 MG tablet, TAKE 1 TABLET BY MOUTH EVERY DAY AT NIGHT, Disp: 90 tablet, Rfl: 1    atenolol (TENORMIN) 50 MG tablet, Take 1 tablet by mouth daily, Disp: 90 tablet, Rfl: 3    omeprazole (PRILOSEC) 20 MG delayed release capsule, Take 1 capsule by mouth daily, Disp: 90 capsule, Rfl: 1    estradiol (ESTRACE) 0.1 MG/GM vaginal cream, Place 2 g vaginally in the morning. INSERT 2 G INTO VAGINA DAILY. ., Disp: 42.5 g, Rfl: 0    TURMERIC PO, Take by mouth, Disp: , Rfl:     busPIRone (BUSPAR) 5 MG tablet, Take 5 mg by mouth 2 times daily as needed, Disp: , Rfl:     Calcium Carb-Cholecalciferol 600-400 MG-UNIT CAPS, Take by mouth, Disp: , Rfl:     calcium citrate-vitamin D (CITRACAL+D) 315-200 MG-UNIT per tablet, Take 1 tablet by mouth daily (with breakfast), Disp: , Rfl:     escitalopram (LEXAPRO) 20 MG tablet, TAKE 1 TABLET EVERY DAY, Disp: , Rfl:     fluticasone (FLONASE) 50 MCG/ACT nasal spray, 2 sprays by Nasal route daily, Disp: , Rfl:     L-Lysine 1000 MG TABS, Take by mouth, Disp: , Rfl:     polyethylene glycol (GLYCOLAX) 17 GM/SCOOP powder, Take 17 g by mouth daily, Disp: , Rfl:     Biotin 5 MG TBDP, Take by mouth (Patient not taking: Reported on 2/6/2023), Disp: , Rfl:     Allergies   Allergen Reactions Codeine Other (See Comments)     Chest pain       Review of Systems   Constitutional:  Negative for chills and fever. Musculoskeletal:  Positive for arthralgias and gait problem. Negative for joint swelling. Skin:  Negative for color change and wound. Neurological:  Negative for numbness. Objective:  /70 (Site: Left Upper Arm, Position: Sitting, Cuff Size: Medium Adult)   Pulse 54   Temp 97 °F (36.1 °C) (Temporal)   Resp 16   Ht 5' 4\" (1.626 m)   Wt 147 lb (66.7 kg)   SpO2 99%   BMI 25.23 kg/m²     Examination:  Physical Exam  Vitals and nursing note reviewed. Constitutional:       General: She is not in acute distress. Appearance: Normal appearance. Cardiovascular:      Rate and Rhythm: Normal rate and regular rhythm. Pulses:           Dorsalis pedis pulses are 2+ on the right side and 2+ on the left side. Pulmonary:      Effort: Pulmonary effort is normal. No respiratory distress. Breath sounds: Normal breath sounds. Abdominal:      General: Bowel sounds are normal.      Palpations: Abdomen is soft. Tenderness: There is no abdominal tenderness. Musculoskeletal:      Right lower leg: No edema. Left lower leg: No edema. Right ankle: No swelling. Tenderness (medially) present. Normal range of motion. Skin:     General: Skin is warm and dry. Findings: No erythema. Neurological:      Mental Status: She is alert and oriented to person, place, and time. Gait: Gait abnormal.   Psychiatric:         Mood and Affect: Mood normal.         Assessment/Plan:    Vance Vincent was seen today for joint pain and ankle pain. Diagnoses and all orders for this visit:    Acute right ankle pain  -     meloxicam (MOBIC) 15 MG tablet; Take 1 tablet by mouth daily as needed for Pain  -     XR ANKLE RIGHT (MIN 3 VIEWS); Future  X-ray today. Take meloxicam PRN. Plan pending x-ray results. Follow-up and Dispositions    Return if symptoms worsen or fail to improve. On this date, 2/6/23, I have spent 25 minutes reviewing previous notes, test results and face to face with the patient discussing the diagnosis and importance of compliance with the treatment plan as well as documenting on the day of the visit. An electronic signature was used to authenticate this note.   KATIE Keita

## 2023-02-07 DIAGNOSIS — M25.571 ACUTE RIGHT ANKLE PAIN: Primary | ICD-10-CM

## 2023-02-16 DIAGNOSIS — K21.9 GASTROESOPHAGEAL REFLUX DISEASE, UNSPECIFIED WHETHER ESOPHAGITIS PRESENT: ICD-10-CM

## 2023-02-17 RX ORDER — OMEPRAZOLE 20 MG/1
CAPSULE, DELAYED RELEASE ORAL
Qty: 90 CAPSULE | Refills: 1 | OUTPATIENT
Start: 2023-02-17

## 2023-02-18 DIAGNOSIS — N95.1 POSTMENOPAUSAL DISORDER: ICD-10-CM

## 2023-02-20 ENCOUNTER — OFFICE VISIT (OUTPATIENT)
Dept: ORTHOPEDIC SURGERY | Age: 68
End: 2023-02-20

## 2023-02-20 DIAGNOSIS — G89.29 CHRONIC PAIN OF RIGHT ANKLE: Primary | ICD-10-CM

## 2023-02-20 DIAGNOSIS — M76.821 TIBIALIS TENDINITIS OF RIGHT LOWER EXTREMITY: ICD-10-CM

## 2023-02-20 DIAGNOSIS — M25.571 CHRONIC PAIN OF RIGHT ANKLE: Primary | ICD-10-CM

## 2023-02-20 RX ORDER — ESTRADIOL 0.1 MG/G
2 CREAM VAGINAL DAILY
Qty: 42.5 G | Refills: 0 | Status: SHIPPED | OUTPATIENT
Start: 2023-02-20

## 2023-02-20 NOTE — PROGRESS NOTES
Name: Lew Howard  YOB: 1955  Gender: female  MRN: [id number]    Summary:     Right stage II posterior tibial tendon sufficiency     CC: Follow-up and New Patient (Right ankle pain )       HPI: Lew Howard is a 75 y.o. female who presents with Follow-up and New Patient (Right ankle pain )  . Patient presents today with a 2-year history of intermittent medial sided ankle pain and now lateral sided ankle pain on the right. She tried had a previous x-ray done for this which we reviewed. History was obtained by Patient     ROS/Meds/PSH/PMH/FH/SH: I personally reviewed the patients standard intake form. Below are the pertinents    Tobacco:  reports that she quit smoking about 32 years ago. Her smoking use included cigarettes. She has a 11.00 pack-year smoking history. She has never been exposed to tobacco smoke. She has never used smokeless tobacco.  Diabetes: None      Physical Examination:    Exam of the right foot shows some swelling and tenderness to palpation on the posterior tibial tendon on the right as well as at the sinus Tarsi. She does have mild planovalgus deformity on standing exam on the side. She is able to perform a single-leg and double leg toe rise. Imaging:   I independently interpreted XR taken today and I independently interpreted XR ordered by a different physician, taken from an outside facility right ankle which shows no fracture  Standing right ankle XR: AP, Lateral, Oblique views     ICD-10-CM    1. Chronic pain of right ankle  M25.571 XR ANKLE RIGHT (MIN 3 VIEWS)    G89.29       2.  Tibialis tendinitis of right lower extremity  M76.821          Report: AP, lateral, oblique x-ray of the hand and right ankle demonstrates no definite fracture    Impression: No definite fracture   Antonio Wilks III, MD           Assessment:   Right stage II posterior tibial tendon insufficiency    Treatment Plan:   4 This is a chronic illness/condition with exacerbation and progression  Treatment at this time: Physical Therapy and ASO - lace up Ankle  Studies ordered: NO XR needed @ Next Visit    Weight-bearing status: WBAT        Return to work/work restrictions: none  No medications given    She will try the home exercise program provided today as well as an ASO lace up ankle brace.

## 2023-02-21 ENCOUNTER — HOSPITAL ENCOUNTER (OUTPATIENT)
Dept: MAMMOGRAPHY | Age: 68
Discharge: HOME | End: 2023-02-24
Payer: MEDICARE

## 2023-02-21 DIAGNOSIS — Z12.31 VISIT FOR SCREENING MAMMOGRAM: ICD-10-CM

## 2023-02-21 DIAGNOSIS — M85.89 OSTEOPENIA OF MULTIPLE SITES: ICD-10-CM

## 2023-02-21 PROCEDURE — 77067 SCR MAMMO BI INCL CAD: CPT

## 2023-02-21 PROCEDURE — 77080 DXA BONE DENSITY AXIAL: CPT

## 2023-03-20 ENCOUNTER — PATIENT MESSAGE (OUTPATIENT)
Dept: INTERNAL MEDICINE CLINIC | Facility: CLINIC | Age: 68
End: 2023-03-20

## 2023-03-20 DIAGNOSIS — F41.9 ANXIETY: Primary | ICD-10-CM

## 2023-03-20 RX ORDER — ESCITALOPRAM OXALATE 20 MG/1
20 TABLET ORAL DAILY
Qty: 90 TABLET | Refills: 3 | Status: SHIPPED | OUTPATIENT
Start: 2023-03-20

## 2023-03-20 NOTE — TELEPHONE ENCOUNTER
From: Emiliano Rojas  To: Dr. Adrienne Barry: 3/20/2023 8:45 AM EDT  Subject: Escitalopram    Good morning! I just discovered I'm about to run out of Escitalopram. Can you please call in [facility] [address] in [facility] with a 90 day Rx? Thanks so much!     Kay Girard

## 2023-03-24 ENCOUNTER — HOSPITAL ENCOUNTER (EMERGENCY)
Age: 68
Discharge: HOME | End: 2023-03-24
Attending: STUDENT IN AN ORGANIZED HEALTH CARE EDUCATION/TRAINING PROGRAM | Admitting: STUDENT IN AN ORGANIZED HEALTH CARE EDUCATION/TRAINING PROGRAM
Payer: MEDICARE

## 2023-03-24 ENCOUNTER — APPOINTMENT (OUTPATIENT)
Dept: CT IMAGING | Age: 68
End: 2023-03-24
Payer: MEDICARE

## 2023-03-24 VITALS
OXYGEN SATURATION: 100 % | DIASTOLIC BLOOD PRESSURE: 87 MMHG | RESPIRATION RATE: 18 BRPM | TEMPERATURE: 97.3 F | HEIGHT: 65 IN | BODY MASS INDEX: 23.32 KG/M2 | WEIGHT: 140 LBS | SYSTOLIC BLOOD PRESSURE: 140 MMHG | HEART RATE: 59 BPM

## 2023-03-24 DIAGNOSIS — R10.84 GENERALIZED ABDOMINAL PAIN: Primary | ICD-10-CM

## 2023-03-24 DIAGNOSIS — K59.00 CONSTIPATION, UNSPECIFIED CONSTIPATION TYPE: ICD-10-CM

## 2023-03-24 LAB
ALBUMIN SERPL-MCNC: 4.7 G/DL (ref 3.2–4.6)
ALBUMIN/GLOB SERPL: 1.4 (ref 0.4–1.6)
ALP SERPL-CCNC: 75 U/L (ref 45–117)
ALT SERPL-CCNC: 17 U/L (ref 13–61)
ANION GAP SERPL CALC-SCNC: 21 MMOL/L (ref 2–11)
APPEARANCE UR: CLEAR
AST SERPL-CCNC: 18 U/L (ref 15–37)
BASOPHILS # BLD: 0 K/UL (ref 0–0.2)
BASOPHILS NFR BLD: 1 % (ref 0–2)
BILIRUB SERPL-MCNC: 0.6 MG/DL (ref 0.2–1.1)
BILIRUB UR QL: NEGATIVE
BUN SERPL-MCNC: 20 MG/DL (ref 7–18)
CALCIUM SERPL-MCNC: 10 MG/DL (ref 8.3–10.4)
CHLORIDE SERPL-SCNC: 99 MMOL/L (ref 98–107)
CO2 SERPL-SCNC: 17 MMOL/L (ref 21–32)
COLOR UR: YELLOW
CREAT SERPL-MCNC: 0.7 MG/DL (ref 0.6–1)
DIFFERENTIAL METHOD BLD: ABNORMAL
EOSINOPHIL # BLD: 0 K/UL (ref 0–0.8)
EOSINOPHIL NFR BLD: 0 % (ref 0.5–7.8)
ERYTHROCYTE [DISTWIDTH] IN BLOOD BY AUTOMATED COUNT: 12.5 % (ref 11.9–14.6)
GLOBULIN SER CALC-MCNC: 3.3 G/DL (ref 2.8–4.5)
GLUCOSE SERPL-MCNC: 151 MG/DL (ref 65–100)
GLUCOSE UR STRIP.AUTO-MCNC: NEGATIVE MG/DL
HCT VFR BLD AUTO: 41 % (ref 35.8–46.3)
HGB BLD-MCNC: 14 G/DL (ref 11.7–15.4)
HGB UR QL STRIP: NEGATIVE
IMM GRANULOCYTES # BLD AUTO: 0 K/UL (ref 0–0.5)
IMM GRANULOCYTES NFR BLD AUTO: 0 % (ref 0–5)
KETONES UR QL STRIP.AUTO: 40 MG/DL
LACTATE SERPL-SCNC: 2.2 MMOL/L (ref 0.4–2)
LACTATE SERPL-SCNC: 4.2 MMOL/L (ref 0.4–2)
LACTATE SERPL-SCNC: 5.2 MMOL/L (ref 0.4–2)
LEUKOCYTE ESTERASE UR QL STRIP.AUTO: NEGATIVE
LIPASE SERPL-CCNC: 29 U/L (ref 13–60)
LYMPHOCYTES # BLD: 1.6 K/UL (ref 0.5–4.6)
LYMPHOCYTES NFR BLD: 19 % (ref 13–44)
MCH RBC QN AUTO: 30.8 PG (ref 26.1–32.9)
MCHC RBC AUTO-ENTMCNC: 34.1 G/DL (ref 31.4–35)
MCV RBC AUTO: 90.1 FL (ref 82–102)
MONOCYTES # BLD: 0.8 K/UL (ref 0.1–1.3)
MONOCYTES NFR BLD: 10 % (ref 4–12)
NEUTS SEG # BLD: 5.9 K/UL (ref 1.7–8.2)
NEUTS SEG NFR BLD: 70 % (ref 43–78)
NITRITE UR QL STRIP.AUTO: NEGATIVE
NRBC # BLD: 0 K/UL (ref 0–0.2)
PH UR STRIP: 7.5 (ref 5–9)
PLATELET # BLD AUTO: 257 K/UL (ref 150–450)
PMV BLD AUTO: 10.5 FL (ref 9.4–12.3)
POTASSIUM SERPL-SCNC: 3.7 MMOL/L (ref 3.5–5.1)
PROT SERPL-MCNC: 8 G/DL (ref 6.4–8.2)
PROT UR STRIP-MCNC: NEGATIVE MG/DL
RBC # BLD AUTO: 4.55 M/UL (ref 4.05–5.2)
SODIUM SERPL-SCNC: 137 MMOL/L (ref 133–143)
SP GR UR REFRACTOMETRY: 1.01 (ref 1–1.02)
UROBILINOGEN UR QL STRIP.AUTO: 0.2 EU/DL (ref 0.2–1)
WBC # BLD AUTO: 8.4 K/UL (ref 4.3–11.1)

## 2023-03-24 PROCEDURE — 99285 EMERGENCY DEPT VISIT HI MDM: CPT

## 2023-03-24 PROCEDURE — 83690 ASSAY OF LIPASE: CPT

## 2023-03-24 PROCEDURE — 96374 THER/PROPH/DIAG INJ IV PUSH: CPT

## 2023-03-24 PROCEDURE — 96375 TX/PRO/DX INJ NEW DRUG ADDON: CPT

## 2023-03-24 PROCEDURE — 80053 COMPREHEN METABOLIC PANEL: CPT

## 2023-03-24 PROCEDURE — 2580000003 HC RX 258: Performed by: STUDENT IN AN ORGANIZED HEALTH CARE EDUCATION/TRAINING PROGRAM

## 2023-03-24 PROCEDURE — 96376 TX/PRO/DX INJ SAME DRUG ADON: CPT

## 2023-03-24 PROCEDURE — 83605 ASSAY OF LACTIC ACID: CPT

## 2023-03-24 PROCEDURE — 74174 CTA ABD&PLVS W/CONTRAST: CPT

## 2023-03-24 PROCEDURE — 85025 COMPLETE CBC W/AUTO DIFF WBC: CPT

## 2023-03-24 PROCEDURE — 6360000004 HC RX CONTRAST MEDICATION: Performed by: STUDENT IN AN ORGANIZED HEALTH CARE EDUCATION/TRAINING PROGRAM

## 2023-03-24 PROCEDURE — 81003 URINALYSIS AUTO W/O SCOPE: CPT

## 2023-03-24 PROCEDURE — 6360000002 HC RX W HCPCS: Performed by: STUDENT IN AN ORGANIZED HEALTH CARE EDUCATION/TRAINING PROGRAM

## 2023-03-24 RX ORDER — 0.9 % SODIUM CHLORIDE 0.9 %
100 INTRAVENOUS SOLUTION INTRAVENOUS ONCE
Status: COMPLETED | OUTPATIENT
Start: 2023-03-24 | End: 2023-03-24

## 2023-03-24 RX ORDER — MORPHINE SULFATE 2 MG/ML
4 INJECTION, SOLUTION INTRAMUSCULAR; INTRAVENOUS ONCE
Status: COMPLETED | OUTPATIENT
Start: 2023-03-24 | End: 2023-03-24

## 2023-03-24 RX ORDER — HYOSCYAMINE SULFATE 0.12 MG/1
1 TABLET SUBLINGUAL 4 TIMES DAILY PRN
Qty: 20 EACH | Refills: 0 | Status: SHIPPED | OUTPATIENT
Start: 2023-03-24

## 2023-03-24 RX ORDER — DIPHENHYDRAMINE HYDROCHLORIDE 50 MG/ML
25 INJECTION INTRAMUSCULAR; INTRAVENOUS
Status: COMPLETED | OUTPATIENT
Start: 2023-03-24 | End: 2023-03-24

## 2023-03-24 RX ORDER — SODIUM CHLORIDE 0.9 % (FLUSH) 0.9 %
10 SYRINGE (ML) INJECTION
Status: COMPLETED | OUTPATIENT
Start: 2023-03-24 | End: 2023-03-24

## 2023-03-24 RX ORDER — METHYLPREDNISOLONE SODIUM SUCCINATE 125 MG/2ML
125 INJECTION, POWDER, LYOPHILIZED, FOR SOLUTION INTRAMUSCULAR; INTRAVENOUS
Status: COMPLETED | OUTPATIENT
Start: 2023-03-24 | End: 2023-03-24

## 2023-03-24 RX ORDER — 0.9 % SODIUM CHLORIDE 0.9 %
1000 INTRAVENOUS SOLUTION INTRAVENOUS
Status: COMPLETED | OUTPATIENT
Start: 2023-03-24 | End: 2023-03-24

## 2023-03-24 RX ORDER — ONDANSETRON 2 MG/ML
4 INJECTION INTRAMUSCULAR; INTRAVENOUS
Status: COMPLETED | OUTPATIENT
Start: 2023-03-24 | End: 2023-03-24

## 2023-03-24 RX ADMIN — DIPHENHYDRAMINE HYDROCHLORIDE 25 MG: 50 INJECTION INTRAMUSCULAR; INTRAVENOUS at 15:46

## 2023-03-24 RX ADMIN — SODIUM CHLORIDE 1000 ML: 9 INJECTION, SOLUTION INTRAVENOUS at 14:14

## 2023-03-24 RX ADMIN — FENTANYL CITRATE 50 MCG: 50 INJECTION INTRAMUSCULAR; INTRAVENOUS at 15:59

## 2023-03-24 RX ADMIN — IOPAMIDOL 100 ML: 755 INJECTION, SOLUTION INTRAVENOUS at 16:19

## 2023-03-24 RX ADMIN — METHYLPREDNISOLONE SODIUM SUCCINATE 125 MG: 125 INJECTION, POWDER, FOR SOLUTION INTRAMUSCULAR; INTRAVENOUS at 15:46

## 2023-03-24 RX ADMIN — FENTANYL CITRATE 50 MCG: 50 INJECTION INTRAMUSCULAR; INTRAVENOUS at 17:12

## 2023-03-24 RX ADMIN — SODIUM CHLORIDE, PRESERVATIVE FREE 10 ML: 5 INJECTION INTRAVENOUS at 16:19

## 2023-03-24 RX ADMIN — SODIUM CHLORIDE 100 ML: 9 INJECTION, SOLUTION INTRAVENOUS at 16:19

## 2023-03-24 RX ADMIN — MORPHINE SULFATE 4 MG: 2 INJECTION, SOLUTION INTRAMUSCULAR; INTRAVENOUS at 14:14

## 2023-03-24 RX ADMIN — ONDANSETRON 4 MG: 2 INJECTION INTRAMUSCULAR; INTRAVENOUS at 14:14

## 2023-03-24 ASSESSMENT — PAIN SCALES - GENERAL
PAINLEVEL_OUTOF10: 4
PAINLEVEL_OUTOF10: 10
PAINLEVEL_OUTOF10: 8

## 2023-03-24 ASSESSMENT — LIFESTYLE VARIABLES
HOW OFTEN DO YOU HAVE A DRINK CONTAINING ALCOHOL: 4 OR MORE TIMES A WEEK
HOW MANY STANDARD DRINKS CONTAINING ALCOHOL DO YOU HAVE ON A TYPICAL DAY: 1 OR 2

## 2023-03-24 ASSESSMENT — PAIN DESCRIPTION - FREQUENCY: FREQUENCY: CONTINUOUS

## 2023-03-24 ASSESSMENT — PAIN DESCRIPTION - LOCATION
LOCATION: ABDOMEN

## 2023-03-24 ASSESSMENT — PAIN - FUNCTIONAL ASSESSMENT
PAIN_FUNCTIONAL_ASSESSMENT: PREVENTS OR INTERFERES SOME ACTIVE ACTIVITIES AND ADLS
PAIN_FUNCTIONAL_ASSESSMENT: 0-10

## 2023-03-24 ASSESSMENT — PAIN DESCRIPTION - PAIN TYPE: TYPE: CHRONIC PAIN

## 2023-03-24 ASSESSMENT — PAIN DESCRIPTION - DESCRIPTORS: DESCRIPTORS: CRAMPING;SQUEEZING

## 2023-03-24 NOTE — ED NOTES
I have reviewed discharge instructions with the patient. The patient verbalized understanding. Patient left ED via Discharge Method: ambulatory to Home with ( family, self,). Opportunity for questions and clarification provided. Patient given 1 scripts. To continue your aftercare when you leave the hospital, you may receive an automated call from our care team to check in on how you are doing. This is a free service and part of our promise to provide the best care and service to meet your aftercare needs.  If you have questions, or wish to unsubscribe from this service please call [phone]. Thank you for Choosing our [address] [place] [address] Emergency Department.        Ricardo Bergeron RN  03/24/23 0970

## 2023-03-24 NOTE — DISCHARGE INSTRUCTIONS
Take Colace daily. Alternate Tylenol Motrin as needed for abdominal discomfort. He is also use Levsin for abdominal cramping. Continue to orally hydrate with clear liquids. Return to the ER for worsening or worrisome symptoms.

## 2023-03-24 NOTE — ED PROVIDER NOTES
(SOLU-MEDROL) injection 125 mg (125 mg IntraVENous Given 3/24/23 1546)   fentaNYL (SUBLIMAZE) injection 50 mcg (50 mcg IntraVENous Given 3/24/23 1559)   iopamidol (ISOVUE-370) 76 % injection 100 mL (100 mLs IntraVENous Given 3/24/23 1619)   0.9 % sodium chloride bolus (0 mLs IntraVENous Stopped 3/24/23 1633)   sodium chloride flush 0.9 % injection 10 mL (10 mLs IntraVENous Given 3/24/23 1619)   fentaNYL (SUBLIMAZE) injection 50 mcg (50 mcg IntraVENous Given 3/24/23 1712)       New Prescriptions    HYOSCYAMINE SULFATE SL (LEVSIN/SL) 0.125 MG SUBL    Place 1 tablet under the tongue 4 times daily as needed (abdominal pain)        Past Medical History:   Diagnosis Date    Anxiety and depression     GERD (gastroesophageal reflux disease)     HTN (hypertension)     Hyperlipidemia     Menopause     Migraine     Osteopenia     Seasonal allergies         Past Surgical History:   Procedure Laterality Date    BREAST BIOPSY Right     BUNIONECTOMY Left     COLONOSCOPY      due for repeat     DERMOID CYST  EXCISION      from ovary    HYSTERECTOMY (CERVIX STATUS UNKNOWN)      ORTHOPEDIC SURGERY Bilateral 2016    thumb    SHOULDER ARTHROSCOPY Right 2017    SINUS SURGERY      TONSILLECTOMY          Family History   Problem Relation Age of Onset    Stroke Mother     Heart Disease Mother     Stroke Father     Prostate Cancer Father     Dementia Sister     Hypertension Brother     Breast Cancer Neg Hx         Social History     Socioeconomic History    Marital status:     Number of children: 4   Occupational History    Occupation: retired, laser optic technician   Tobacco Use    Smoking status: Former     Packs/day: 0.50     Years: 22.00     Pack years: 11.00     Types: Cigarettes     Quit date:      Years since quittin.2     Passive exposure: Never    Smokeless tobacco: Never   Vaping Use    Vaping Use: Never used   Substance and Sexual Activity    Alcohol use:  Yes     Alcohol/week: 14.0

## 2023-03-24 NOTE — ED TRIAGE NOTES
Patient here for lower mid abdominal pain. Started this morning, getting progessively worse.  Pt had normal BM this morning. Nausea, denies vomiting and diarrhea.

## 2023-03-25 LAB
EKG ATRIAL RATE: 59 BPM
EKG DIAGNOSIS: NORMAL
EKG P AXIS: 66 DEGREES
EKG P-R INTERVAL: 153 MS
EKG Q-T INTERVAL: 482 MS
EKG QRS DURATION: 114 MS
EKG QTC CALCULATION (BAZETT): 478 MS
EKG R AXIS: 60 DEGREES
EKG T AXIS: 62 DEGREES
EKG VENTRICULAR RATE: 59 BPM

## 2023-03-30 ENCOUNTER — PATIENT MESSAGE (OUTPATIENT)
Dept: INTERNAL MEDICINE CLINIC | Facility: CLINIC | Age: 68
End: 2023-03-30

## 2023-03-30 DIAGNOSIS — K21.9 GASTROESOPHAGEAL REFLUX DISEASE, UNSPECIFIED WHETHER ESOPHAGITIS PRESENT: ICD-10-CM

## 2023-03-30 NOTE — TELEPHONE ENCOUNTER
From: Paulo Taylor  To: Dr. Garcia David: 3/30/2023 6:06 AM EDT  Subject: Omeprozole 20mg    Good morning! Please call in an Rx to [facility] for the subject medication. Thanks!   Wayne Cutler

## 2023-03-31 RX ORDER — OMEPRAZOLE 20 MG/1
20 CAPSULE, DELAYED RELEASE ORAL DAILY
Qty: 90 CAPSULE | Refills: 1 | Status: SHIPPED | OUTPATIENT
Start: 2023-03-31

## 2023-03-31 RX ORDER — OMEPRAZOLE 20 MG/1
20 CAPSULE, DELAYED RELEASE ORAL DAILY
Qty: 90 CAPSULE | Refills: 1 | Status: SHIPPED | OUTPATIENT
Start: 2023-03-31 | End: 2023-03-31 | Stop reason: SDUPTHER

## 2023-03-31 NOTE — TELEPHONE ENCOUNTER
Pt's Rx for Omeprazole went to [facility]; however, it was supposed to go to [facility]. Thank you.

## 2023-04-07 ENCOUNTER — TELEMEDICINE (OUTPATIENT)
Dept: INTERNAL MEDICINE CLINIC | Facility: CLINIC | Age: 68
End: 2023-04-07

## 2023-04-07 DIAGNOSIS — Z09 HOSPITAL DISCHARGE FOLLOW-UP: Primary | ICD-10-CM

## 2023-04-07 DIAGNOSIS — R10.84 GENERALIZED ABDOMINAL PAIN: ICD-10-CM

## 2023-04-07 DIAGNOSIS — J30.1 SEASONAL ALLERGIC RHINITIS DUE TO POLLEN: ICD-10-CM

## 2023-04-07 DIAGNOSIS — K59.09 OTHER CONSTIPATION: ICD-10-CM

## 2023-04-07 RX ORDER — MONTELUKAST SODIUM 10 MG/1
10 TABLET ORAL NIGHTLY
Qty: 30 TABLET | Refills: 5 | Status: SHIPPED | OUTPATIENT
Start: 2023-04-07

## 2023-04-07 ASSESSMENT — ENCOUNTER SYMPTOMS
RESPIRATORY NEGATIVE: 1
RHINORRHEA: 1
CONSTIPATION: 0
ABDOMINAL PAIN: 0

## 2023-04-07 NOTE — PROGRESS NOTES
Vitamins-Minerals (MULTIVITAMIN ADULT EXTRA C PO), Take by mouth, Disp: , Rfl:     meloxicam (MOBIC) 15 MG tablet, Take 1 tablet by mouth daily as needed for Pain, Disp: 30 tablet, Rfl: 0    valACYclovir (VALTREX) 1 g tablet, Take 1 tablet by mouth 2 times daily, Disp: , Rfl:     rizatriptan (MAXALT-MLT) 10 MG disintegrating tablet, Take 1 tablet by mouth every 2 hours as needed for Migraine do not exceed 30 mg in 24 hours, Disp: 12 tablet, Rfl: 2    traZODone (DESYREL) 50 MG tablet, TAKE 1 TABLET BY MOUTH EVERY DAY AT NIGHT, Disp: 90 tablet, Rfl: 1    atenolol (TENORMIN) 50 MG tablet, Take 1 tablet by mouth daily, Disp: 90 tablet, Rfl: 3    TURMERIC PO, Take by mouth, Disp: , Rfl:     Biotin 5 MG TBDP, Take by mouth (Patient not taking: Reported on 2/6/2023), Disp: , Rfl:     busPIRone (BUSPAR) 5 MG tablet, Take 5 mg by mouth 2 times daily as needed, Disp: , Rfl:     Calcium Carb-Cholecalciferol 600-400 MG-UNIT CAPS, Take by mouth, Disp: , Rfl:     calcium citrate-vitamin D (CITRACAL+D) 315-200 MG-UNIT per tablet, Take 1 tablet by mouth daily (with breakfast), Disp: , Rfl:     fluticasone (FLONASE) 50 MCG/ACT nasal spray, 2 sprays by Nasal route daily, Disp: , Rfl:     L-Lysine 1000 MG TABS, Take by mouth, Disp: , Rfl:     polyethylene glycol (GLYCOLAX) 17 GM/SCOOP powder, Take 17 g by mouth daily, Disp: , Rfl:     Allergies   Allergen Reactions    Codeine Other (See Comments)     Chest pain    Thimerosal        Review of Systems   Constitutional:  Negative for chills and fever. HENT:  Positive for congestion, postnasal drip and rhinorrhea. Respiratory: Negative. Cardiovascular: Negative. Gastrointestinal:  Negative for abdominal pain and constipation. Objective: There were no vitals taken for this visit. Examination:  Physical Exam  Neurological:      Mental Status: She is alert. Psychiatric:         Mood and Affect: Mood normal.         Thought Content:  Thought content normal.

## 2023-04-24 DIAGNOSIS — G47.00 INSOMNIA, UNSPECIFIED TYPE: ICD-10-CM

## 2023-04-24 RX ORDER — TRAZODONE HYDROCHLORIDE 50 MG/1
TABLET ORAL
Qty: 90 TABLET | Refills: 1 | OUTPATIENT
Start: 2023-04-24

## 2023-05-18 ENCOUNTER — OFFICE VISIT (OUTPATIENT)
Dept: ENT CLINIC | Age: 68
End: 2023-05-18
Payer: MEDICARE

## 2023-05-18 VITALS — RESPIRATION RATE: 16 BRPM | HEIGHT: 65 IN | WEIGHT: 139 LBS | BODY MASS INDEX: 23.16 KG/M2

## 2023-05-18 DIAGNOSIS — H90.3 SENSORINEURAL HEARING LOSS, BILATERAL: Primary | ICD-10-CM

## 2023-05-18 DIAGNOSIS — J34.89 NASAL VESTIBULITIS: ICD-10-CM

## 2023-05-18 DIAGNOSIS — H61.23 IMPACTED CERUMEN, BILATERAL: ICD-10-CM

## 2023-05-18 PROCEDURE — 1036F TOBACCO NON-USER: CPT | Performed by: STUDENT IN AN ORGANIZED HEALTH CARE EDUCATION/TRAINING PROGRAM

## 2023-05-18 PROCEDURE — G8420 CALC BMI NORM PARAMETERS: HCPCS | Performed by: STUDENT IN AN ORGANIZED HEALTH CARE EDUCATION/TRAINING PROGRAM

## 2023-05-18 PROCEDURE — 99214 OFFICE O/P EST MOD 30 MIN: CPT | Performed by: STUDENT IN AN ORGANIZED HEALTH CARE EDUCATION/TRAINING PROGRAM

## 2023-05-18 PROCEDURE — G8427 DOCREV CUR MEDS BY ELIG CLIN: HCPCS | Performed by: STUDENT IN AN ORGANIZED HEALTH CARE EDUCATION/TRAINING PROGRAM

## 2023-05-18 PROCEDURE — 1090F PRES/ABSN URINE INCON ASSESS: CPT | Performed by: STUDENT IN AN ORGANIZED HEALTH CARE EDUCATION/TRAINING PROGRAM

## 2023-05-18 PROCEDURE — 1123F ACP DISCUSS/DSCN MKR DOCD: CPT | Performed by: STUDENT IN AN ORGANIZED HEALTH CARE EDUCATION/TRAINING PROGRAM

## 2023-05-18 PROCEDURE — 69210 REMOVE IMPACTED EAR WAX UNI: CPT | Performed by: STUDENT IN AN ORGANIZED HEALTH CARE EDUCATION/TRAINING PROGRAM

## 2023-05-18 PROCEDURE — G8399 PT W/DXA RESULTS DOCUMENT: HCPCS | Performed by: STUDENT IN AN ORGANIZED HEALTH CARE EDUCATION/TRAINING PROGRAM

## 2023-05-18 PROCEDURE — 3017F COLORECTAL CA SCREEN DOC REV: CPT | Performed by: STUDENT IN AN ORGANIZED HEALTH CARE EDUCATION/TRAINING PROGRAM

## 2023-05-18 ASSESSMENT — ENCOUNTER SYMPTOMS
APNEA: 0
CHOKING: 0
EYE ITCHING: 0
EYE PAIN: 0
STRIDOR: 0
FACIAL SWELLING: 0
SINUS PAIN: 0
SHORTNESS OF BREATH: 0
DIARRHEA: 0
EYE DISCHARGE: 0
SINUS PRESSURE: 0
CONSTIPATION: 0
WHEEZING: 0
COUGH: 0
NAUSEA: 0

## 2023-05-18 NOTE — PROGRESS NOTES
1000 MG TABS Take by mouth    polyethylene glycol (GLYCOLAX) 17 GM/SCOOP powder Take 17 g by mouth daily     No current facility-administered medications for this visit. Past Medical History:   Diagnosis Date    Anxiety and depression     GERD (gastroesophageal reflux disease)     HTN (hypertension)     Hyperlipidemia     Menopause     Migraine     Osteopenia     Seasonal allergies        Past Surgical History:   Procedure Laterality Date    BREAST BIOPSY Right     BUNIONECTOMY Left     COLONOSCOPY      due for repeat     DERMOID CYST  EXCISION  1984    from ovary    HYSTERECTOMY (CERVIX STATUS UNKNOWN)      ORTHOPEDIC SURGERY Bilateral 2016    thumb    SHOULDER ARTHROSCOPY Right 2017    [facility]    TONSILLECTOMY  195       Social History     Tobacco Use    Smoking status: Former     Packs/day: 0.50     Years: 22.00     Pack years: 11.00     Types: Cigarettes     Quit date:      Years since quittin.3     Passive exposure: Never    Smokeless tobacco: Never   Substance Use Topics    Alcohol use: Yes     Alcohol/week: 14.0 standard drinks     Types: 14 Glasses of wine per week       Family History   Problem Relation Age of Onset    Stroke Mother     Heart Disease Mother     Stroke Father     Prostate Cancer Father     Dementia Sister     Hypertension Brother     Breast Cancer Neg Hx         ROS:    Review of Systems   Constitutional:  Negative for chills and fever. HENT:  Negative for congestion, facial swelling, hearing loss, nosebleeds, sinus pressure, sinus pain and sneezing. Eyes:  Negative for pain, discharge and itching. Respiratory:  Negative for apnea, cough, choking, shortness of breath, wheezing and stridor. Cardiovascular:  Negative for chest pain. Gastrointestinal:  Negative for constipation, diarrhea and nausea. Endocrine: Negative for polyuria. Genitourinary:  Negative for difficulty urinating and flank pain.    Musculoskeletal:  Negative for

## 2023-05-23 ENCOUNTER — PATIENT MESSAGE (OUTPATIENT)
Dept: INTERNAL MEDICINE CLINIC | Facility: CLINIC | Age: 68
End: 2023-05-23

## 2023-05-23 DIAGNOSIS — G47.00 INSOMNIA, UNSPECIFIED TYPE: ICD-10-CM

## 2023-05-23 RX ORDER — TRAZODONE HYDROCHLORIDE 50 MG/1
TABLET ORAL
Qty: 90 TABLET | Refills: 1 | Status: SHIPPED | OUTPATIENT
Start: 2023-05-23

## 2023-05-23 NOTE — TELEPHONE ENCOUNTER
From: David Sandoval  To: Dr. Indira Hernandez: 5/23/2023 12:30 PM EDT  Subject: TRAZODONE 50MG    Hello,    Please send a prescription to [place] [id number] for Trazodone, 50 mg, 90 day supply and if possible, can you please put some refills on it?     Thanks so much,  [employer]

## 2023-06-06 ENCOUNTER — NURSE ONLY (OUTPATIENT)
Dept: INTERNAL MEDICINE CLINIC | Facility: CLINIC | Age: 68
End: 2023-06-06
Payer: MEDICARE

## 2023-06-06 DIAGNOSIS — R30.0 BURNING WITH URINATION: ICD-10-CM

## 2023-06-06 DIAGNOSIS — R30.9 PAINFUL URINATION: ICD-10-CM

## 2023-06-06 DIAGNOSIS — R82.90 ABNORMAL FINDING ON URINALYSIS: Primary | ICD-10-CM

## 2023-06-06 DIAGNOSIS — R82.90 ABNORMAL FINDING ON URINALYSIS: ICD-10-CM

## 2023-06-06 LAB
BILIRUBIN, URINE, POC: NEGATIVE
BLOOD URINE, POC: NEGATIVE
GLUCOSE URINE, POC: NEGATIVE
KETONES, URINE, POC: NEGATIVE
LEUKOCYTE ESTERASE, URINE, POC: NEGATIVE
NITRITE, URINE, POC: POSITIVE
PH, URINE, POC: 6.5 (ref 4.6–8)
PROTEIN,URINE, POC: NEGATIVE
SPECIFIC GRAVITY, URINE, POC: 1.01 (ref 1–1.03)
URINALYSIS CLARITY, POC: CLEAR
URINALYSIS COLOR, POC: YELLOW
UROBILINOGEN, POC: NORMAL

## 2023-06-06 PROCEDURE — 81003 URINALYSIS AUTO W/O SCOPE: CPT | Performed by: INTERNAL MEDICINE

## 2023-06-06 RX ORDER — NITROFURANTOIN 25; 75 MG/1; MG/1
100 CAPSULE ORAL 2 TIMES DAILY
Qty: 20 CAPSULE | Refills: 0 | Status: SHIPPED | OUTPATIENT
Start: 2023-06-06 | End: 2023-06-16

## 2023-06-09 LAB
BACTERIA SPEC CULT: NORMAL
SERVICE CMNT-IMP: NORMAL

## 2023-06-19 ENCOUNTER — HOSPITAL ENCOUNTER (OUTPATIENT)
Dept: ULTRASOUND IMAGING | Age: 68
Discharge: HOME | End: 2023-06-21
Payer: MEDICARE

## 2023-06-19 DIAGNOSIS — K66.8 CYSTIC LESION OF ABDOMINAL VISCERA: ICD-10-CM

## 2023-06-19 PROCEDURE — 76705 ECHO EXAM OF ABDOMEN: CPT

## 2023-06-25 ASSESSMENT — PATIENT HEALTH QUESTIONNAIRE - PHQ9
SUM OF ALL RESPONSES TO PHQ QUESTIONS 1-9: 0
2. FEELING DOWN, DEPRESSED OR HOPELESS: 0
3. TROUBLE FALLING OR STAYING ASLEEP: 0
SUM OF ALL RESPONSES TO PHQ QUESTIONS 1-9: 0
1. LITTLE INTEREST OR PLEASURE IN DOING THINGS: NOT AT ALL
4. FEELING TIRED OR HAVING LITTLE ENERGY: 0
6. FEELING BAD ABOUT YOURSELF - OR THAT YOU ARE A FAILURE OR HAVE LET YOURSELF OR YOUR FAMILY DOWN: 0
10. IF YOU CHECKED OFF ANY PROBLEMS, HOW DIFFICULT HAVE THESE PROBLEMS MADE IT FOR YOU TO DO YOUR WORK, TAKE CARE OF THINGS AT HOME, OR GET ALONG WITH OTHER PEOPLE: NOT DIFFICULT AT ALL
9. THOUGHTS THAT YOU WOULD BE BETTER OFF DEAD, OR OF HURTING YOURSELF: 0
7. TROUBLE CONCENTRATING ON THINGS, SUCH AS READING THE NEWSPAPER OR WATCHING TELEVISION: NOT AT ALL
2. FEELING DOWN, DEPRESSED OR HOPELESS: NOT AT ALL
5. POOR APPETITE OR OVEREATING: 0
SUM OF ALL RESPONSES TO PHQ QUESTIONS 1-9: 0
1. LITTLE INTEREST OR PLEASURE IN DOING THINGS: 0
SUM OF ALL RESPONSES TO PHQ QUESTIONS 1-9: 0
7. TROUBLE CONCENTRATING ON THINGS, SUCH AS READING THE NEWSPAPER OR WATCHING TELEVISION: 0
SUM OF ALL RESPONSES TO PHQ QUESTIONS 1-9: 0
5. POOR APPETITE OR OVEREATING: NOT AT ALL
10. IF YOU CHECKED OFF ANY PROBLEMS, HOW DIFFICULT HAVE THESE PROBLEMS MADE IT FOR YOU TO DO YOUR WORK, TAKE CARE OF THINGS AT HOME, OR GET ALONG WITH OTHER PEOPLE: 0
4. FEELING TIRED OR HAVING LITTLE ENERGY: NOT AT ALL
6. FEELING BAD ABOUT YOURSELF - OR THAT YOU ARE A FAILURE OR HAVE LET YOURSELF OR YOUR FAMILY DOWN: NOT AT ALL
3. TROUBLE FALLING OR STAYING ASLEEP: NOT AT ALL
8. MOVING OR SPEAKING SO SLOWLY THAT OTHER PEOPLE COULD HAVE NOTICED. OR THE OPPOSITE, BEING SO FIGETY OR RESTLESS THAT YOU HAVE BEEN MOVING AROUND A LOT MORE THAN USUAL: 0
8. MOVING OR SPEAKING SO SLOWLY THAT OTHER PEOPLE COULD HAVE NOTICED. OR THE OPPOSITE - BEING SO FIDGETY OR RESTLESS THAT YOU HAVE BEEN MOVING AROUND A LOT MORE THAN USUAL: NOT AT ALL
9. THOUGHTS THAT YOU WOULD BE BETTER OFF DEAD, OR OF HURTING YOURSELF: NOT AT ALL
SUM OF ALL RESPONSES TO PHQ9 QUESTIONS 1 & 2: 0

## 2023-06-26 ENCOUNTER — OFFICE VISIT (OUTPATIENT)
Dept: INTERNAL MEDICINE CLINIC | Facility: CLINIC | Age: 68
End: 2023-06-26
Payer: MEDICARE

## 2023-06-26 ENCOUNTER — HOSPITAL ENCOUNTER (OUTPATIENT)
Dept: GENERAL RADIOLOGY | Age: 68
Discharge: HOME | End: 2023-06-29
Payer: MEDICARE

## 2023-06-26 VITALS
DIASTOLIC BLOOD PRESSURE: 80 MMHG | OXYGEN SATURATION: 97 % | HEIGHT: 65 IN | WEIGHT: 140 LBS | HEART RATE: 47 BPM | SYSTOLIC BLOOD PRESSURE: 120 MMHG | BODY MASS INDEX: 23.32 KG/M2 | TEMPERATURE: 97.2 F

## 2023-06-26 DIAGNOSIS — M54.2 NECK PAIN, BILATERAL: ICD-10-CM

## 2023-06-26 DIAGNOSIS — M54.50 ACUTE MIDLINE LOW BACK PAIN WITHOUT SCIATICA: ICD-10-CM

## 2023-06-26 DIAGNOSIS — M54.50 ACUTE MIDLINE LOW BACK PAIN WITHOUT SCIATICA: Primary | ICD-10-CM

## 2023-06-26 PROCEDURE — 3074F SYST BP LT 130 MM HG: CPT | Performed by: PHYSICIAN ASSISTANT

## 2023-06-26 PROCEDURE — G8399 PT W/DXA RESULTS DOCUMENT: HCPCS | Performed by: PHYSICIAN ASSISTANT

## 2023-06-26 PROCEDURE — 1036F TOBACCO NON-USER: CPT | Performed by: PHYSICIAN ASSISTANT

## 2023-06-26 PROCEDURE — 72100 X-RAY EXAM L-S SPINE 2/3 VWS: CPT

## 2023-06-26 PROCEDURE — 3017F COLORECTAL CA SCREEN DOC REV: CPT | Performed by: PHYSICIAN ASSISTANT

## 2023-06-26 PROCEDURE — G8420 CALC BMI NORM PARAMETERS: HCPCS | Performed by: PHYSICIAN ASSISTANT

## 2023-06-26 PROCEDURE — 1090F PRES/ABSN URINE INCON ASSESS: CPT | Performed by: PHYSICIAN ASSISTANT

## 2023-06-26 PROCEDURE — 72040 X-RAY EXAM NECK SPINE 2-3 VW: CPT

## 2023-06-26 PROCEDURE — G8427 DOCREV CUR MEDS BY ELIG CLIN: HCPCS | Performed by: PHYSICIAN ASSISTANT

## 2023-06-26 PROCEDURE — 3079F DIAST BP 80-89 MM HG: CPT | Performed by: PHYSICIAN ASSISTANT

## 2023-06-26 PROCEDURE — 99214 OFFICE O/P EST MOD 30 MIN: CPT | Performed by: PHYSICIAN ASSISTANT

## 2023-06-26 PROCEDURE — 1123F ACP DISCUSS/DSCN MKR DOCD: CPT | Performed by: PHYSICIAN ASSISTANT

## 2023-06-26 PROCEDURE — 72220 X-RAY EXAM SACRUM TAILBONE: CPT

## 2023-06-26 ASSESSMENT — ENCOUNTER SYMPTOMS
VISUAL CHANGE: 0
BOWEL INCONTINENCE: 0
BACK PAIN: 1

## 2023-06-27 DIAGNOSIS — M54.50 ACUTE MIDLINE LOW BACK PAIN WITHOUT SCIATICA: Primary | ICD-10-CM

## 2023-06-27 DIAGNOSIS — M54.2 NECK PAIN, BILATERAL: ICD-10-CM

## 2023-06-27 RX ORDER — METHOCARBAMOL 500 MG/1
500 TABLET, FILM COATED ORAL 4 TIMES DAILY
Qty: 60 TABLET | Refills: 0 | Status: SHIPPED | OUTPATIENT
Start: 2023-06-27 | End: 2023-07-12

## 2023-06-29 ENCOUNTER — OFFICE VISIT (OUTPATIENT)
Dept: ENT CLINIC | Age: 68
End: 2023-06-29
Payer: MEDICARE

## 2023-06-29 VITALS — BODY MASS INDEX: 23.16 KG/M2 | RESPIRATION RATE: 17 BRPM | HEIGHT: 65 IN | WEIGHT: 139 LBS

## 2023-06-29 DIAGNOSIS — H90.3 ASYMMETRIC SNHL (SENSORINEURAL HEARING LOSS): Primary | ICD-10-CM

## 2023-06-29 PROCEDURE — 1036F TOBACCO NON-USER: CPT | Performed by: STUDENT IN AN ORGANIZED HEALTH CARE EDUCATION/TRAINING PROGRAM

## 2023-06-29 PROCEDURE — 1123F ACP DISCUSS/DSCN MKR DOCD: CPT | Performed by: STUDENT IN AN ORGANIZED HEALTH CARE EDUCATION/TRAINING PROGRAM

## 2023-06-29 PROCEDURE — 1090F PRES/ABSN URINE INCON ASSESS: CPT | Performed by: STUDENT IN AN ORGANIZED HEALTH CARE EDUCATION/TRAINING PROGRAM

## 2023-06-29 PROCEDURE — G8427 DOCREV CUR MEDS BY ELIG CLIN: HCPCS | Performed by: STUDENT IN AN ORGANIZED HEALTH CARE EDUCATION/TRAINING PROGRAM

## 2023-06-29 PROCEDURE — G8420 CALC BMI NORM PARAMETERS: HCPCS | Performed by: STUDENT IN AN ORGANIZED HEALTH CARE EDUCATION/TRAINING PROGRAM

## 2023-06-29 PROCEDURE — G8399 PT W/DXA RESULTS DOCUMENT: HCPCS | Performed by: STUDENT IN AN ORGANIZED HEALTH CARE EDUCATION/TRAINING PROGRAM

## 2023-06-29 PROCEDURE — 3017F COLORECTAL CA SCREEN DOC REV: CPT | Performed by: STUDENT IN AN ORGANIZED HEALTH CARE EDUCATION/TRAINING PROGRAM

## 2023-06-29 PROCEDURE — 99213 OFFICE O/P EST LOW 20 MIN: CPT | Performed by: STUDENT IN AN ORGANIZED HEALTH CARE EDUCATION/TRAINING PROGRAM

## 2023-06-29 ASSESSMENT — ENCOUNTER SYMPTOMS
STRIDOR: 0
WHEEZING: 0
CHOKING: 0
EYE DISCHARGE: 0
DIARRHEA: 0
CONSTIPATION: 0
APNEA: 0
COUGH: 0
EYE PAIN: 0
SHORTNESS OF BREATH: 0
SINUS PAIN: 0
SINUS PRESSURE: 0
NAUSEA: 0
EYE ITCHING: 0
FACIAL SWELLING: 0

## 2023-06-30 ENCOUNTER — OFFICE VISIT (OUTPATIENT)
Dept: RHEUMATOLOGY | Age: 68
End: 2023-06-30
Payer: MEDICARE

## 2023-06-30 VITALS
DIASTOLIC BLOOD PRESSURE: 63 MMHG | SYSTOLIC BLOOD PRESSURE: 118 MMHG | WEIGHT: 138 LBS | HEART RATE: 47 BPM | HEIGHT: 65 IN | BODY MASS INDEX: 22.99 KG/M2

## 2023-06-30 DIAGNOSIS — Z71.2 ENCOUNTER TO DISCUSS X-RAY RESULTS: ICD-10-CM

## 2023-06-30 DIAGNOSIS — Z79.1 LONG TERM (CURRENT) USE OF NON-STEROIDAL ANTI-INFLAMMATORIES (NSAID): ICD-10-CM

## 2023-06-30 DIAGNOSIS — R76.8 POSITIVE ANA (ANTINUCLEAR ANTIBODY): Primary | ICD-10-CM

## 2023-06-30 DIAGNOSIS — M13.0 POLYARTHRITIS OF MULTIPLE SITES: ICD-10-CM

## 2023-06-30 DIAGNOSIS — R76.8 POSITIVE ANA (ANTINUCLEAR ANTIBODY): ICD-10-CM

## 2023-06-30 LAB
ALBUMIN SERPL-MCNC: 3.5 G/DL (ref 3.2–4.6)
ALBUMIN/GLOB SERPL: 1 (ref 0.4–1.6)
ALP SERPL-CCNC: 59 U/L (ref 50–136)
ALT SERPL-CCNC: 22 U/L (ref 12–65)
ANION GAP SERPL CALC-SCNC: 4 MMOL/L (ref 2–11)
AST SERPL-CCNC: 12 U/L (ref 15–37)
BILIRUB SERPL-MCNC: 0.4 MG/DL (ref 0.2–1.1)
BUN SERPL-MCNC: 18 MG/DL (ref 8–23)
CALCIUM SERPL-MCNC: 9.3 MG/DL (ref 8.3–10.4)
CHLORIDE SERPL-SCNC: 106 MMOL/L (ref 101–110)
CO2 SERPL-SCNC: 28 MMOL/L (ref 21–32)
CREAT SERPL-MCNC: 0.7 MG/DL (ref 0.6–1)
CRP SERPL-MCNC: <0.3 MG/DL (ref 0–0.9)
GLOBULIN SER CALC-MCNC: 3.4 G/DL (ref 2.8–4.5)
GLUCOSE SERPL-MCNC: 86 MG/DL (ref 65–100)
POTASSIUM SERPL-SCNC: 4.3 MMOL/L (ref 3.5–5.1)
PROT SERPL-MCNC: 6.9 G/DL (ref 6.3–8.2)
SODIUM SERPL-SCNC: 138 MMOL/L (ref 133–143)

## 2023-06-30 PROCEDURE — 3017F COLORECTAL CA SCREEN DOC REV: CPT | Performed by: INTERNAL MEDICINE

## 2023-06-30 PROCEDURE — 1090F PRES/ABSN URINE INCON ASSESS: CPT | Performed by: INTERNAL MEDICINE

## 2023-06-30 PROCEDURE — G8399 PT W/DXA RESULTS DOCUMENT: HCPCS | Performed by: INTERNAL MEDICINE

## 2023-06-30 PROCEDURE — 1123F ACP DISCUSS/DSCN MKR DOCD: CPT | Performed by: INTERNAL MEDICINE

## 2023-06-30 PROCEDURE — 3078F DIAST BP <80 MM HG: CPT | Performed by: INTERNAL MEDICINE

## 2023-06-30 PROCEDURE — 1036F TOBACCO NON-USER: CPT | Performed by: INTERNAL MEDICINE

## 2023-06-30 PROCEDURE — G8427 DOCREV CUR MEDS BY ELIG CLIN: HCPCS | Performed by: INTERNAL MEDICINE

## 2023-06-30 PROCEDURE — 3074F SYST BP LT 130 MM HG: CPT | Performed by: INTERNAL MEDICINE

## 2023-06-30 PROCEDURE — 99215 OFFICE O/P EST HI 40 MIN: CPT | Performed by: INTERNAL MEDICINE

## 2023-06-30 PROCEDURE — G8420 CALC BMI NORM PARAMETERS: HCPCS | Performed by: INTERNAL MEDICINE

## 2023-06-30 ASSESSMENT — ROUTINE ASSESSMENT OF PATIENT INDEX DATA (RAPID3)
ON A SCALE OF ONE TO TEN, HOW DIFFICULT WAS IT FOR YOU TO COMPLETE THE LISTED DAILY PHYSICAL TASKS OVER THE LAST WEEK: 0.3
ON A SCALE OF ONE TO TEN, HOW MUCH OF A PROBLEM HAS UNUSUAL FATIGUE OR TIREDNESS BEEN FOR YOU OVER THE PAST WEEK?: 4
ON A SCALE OF ONE TO TEN, CONSIDERING ALL THE WAYS IN WHICH ILLNESS AND HEALTH CONDITIONS MAY AFFECT YOU AT THIS TIME, PLEASE INDICATE BELOW HOW YOU ARE DOING:: 7
WHEN YOU AWAKENED IN THE MORNING OVER THE LAST WEEK, PLEASE INDICATE THE AMOUNT OF TIME IT TAKES UNTIL YOU ARE AS LIMBER AS YOU WILL BE FOR THE DAY: < 10 MIN
ON A SCALE OF ONE TO TEN, HOW MUCH PAIN HAVE YOU HAD BECAUSE OF YOUR CONDITION OVER THE PAST WEEK?: 7

## 2023-06-30 ASSESSMENT — JOINT PAIN
TOTAL NUMBER OF SWOLLEN JOINTS: 0
TOTAL NUMBER OF TENDER JOINTS: 8

## 2023-07-03 LAB — RHEUMATOID FACT SER QL LA: NEGATIVE

## 2023-07-04 LAB
CCP IGA+IGG SERPL IA-ACNC: 7 UNITS (ref 0–19)
ENA SS-A AB SER-ACNC: 1.8 AI (ref 0–0.9)
ENA SS-B AB SER-ACNC: <0.2 AI (ref 0–0.9)

## 2023-07-13 ENCOUNTER — PATIENT MESSAGE (OUTPATIENT)
Dept: INTERNAL MEDICINE CLINIC | Facility: CLINIC | Age: 68
End: 2023-07-13

## 2023-07-13 DIAGNOSIS — M47.816 LUMBAR FACET ARTHROPATHY: ICD-10-CM

## 2023-07-13 DIAGNOSIS — M25.571 ACUTE RIGHT ANKLE PAIN: ICD-10-CM

## 2023-07-13 DIAGNOSIS — M47.816 LUMBAR SPONDYLOSIS: Primary | ICD-10-CM

## 2023-07-13 RX ORDER — MELOXICAM 15 MG/1
15 TABLET ORAL DAILY PRN
Qty: 30 TABLET | Refills: 5 | Status: SHIPPED | OUTPATIENT
Start: 2023-07-13

## 2023-07-13 NOTE — TELEPHONE ENCOUNTER
From: Skylar Pepper  To: Angela Oliver  Sent: 7/13/2023 7:02 AM EDT  Subject: Lower Back Pain    Good morning Cecy,    I've been doing stretches, had a massage, etc., I'm still dealing with lower back pain. It's really interfering with my activities. I would like a referral to Imelda Mosley M.D at [facility].     Also, could I please have a prescription for Meloxicam called in to [facility]?    Thanks very much,  Damaris Mckeon

## 2023-08-02 ENCOUNTER — HOSPITAL ENCOUNTER (OUTPATIENT)
Dept: GENERAL RADIOLOGY | Age: 68
Discharge: HOME | End: 2023-08-05
Payer: MEDICARE

## 2023-08-02 DIAGNOSIS — R52 PAIN: ICD-10-CM

## 2023-08-02 PROCEDURE — 72170 X-RAY EXAM OF PELVIS: CPT

## 2023-09-02 DIAGNOSIS — I10 ESSENTIAL HYPERTENSION: ICD-10-CM

## 2023-09-05 RX ORDER — ATENOLOL 50 MG/1
TABLET ORAL
Qty: 90 TABLET | Refills: 3 | OUTPATIENT
Start: 2023-09-05

## 2023-09-08 ENCOUNTER — OFFICE VISIT (OUTPATIENT)
Dept: INTERNAL MEDICINE CLINIC | Facility: CLINIC | Age: 68
End: 2023-09-08
Payer: MEDICARE

## 2023-09-08 ENCOUNTER — HOSPITAL ENCOUNTER (OUTPATIENT)
Dept: ULTRASOUND IMAGING | Age: 68
End: 2023-09-08
Payer: MEDICARE

## 2023-09-08 VITALS
DIASTOLIC BLOOD PRESSURE: 78 MMHG | OXYGEN SATURATION: 97 % | HEART RATE: 60 BPM | SYSTOLIC BLOOD PRESSURE: 118 MMHG | BODY MASS INDEX: 22.82 KG/M2 | TEMPERATURE: 97.4 F | WEIGHT: 137 LBS | HEIGHT: 65 IN

## 2023-09-08 DIAGNOSIS — M79.661 PAIN IN RIGHT LOWER LEG: ICD-10-CM

## 2023-09-08 DIAGNOSIS — I82.451 ACUTE DEEP VEIN THROMBOSIS (DVT) OF RIGHT PERONEAL VEIN (HCC): ICD-10-CM

## 2023-09-08 DIAGNOSIS — R26.81 GAIT INSTABILITY: ICD-10-CM

## 2023-09-08 DIAGNOSIS — M79.661 PAIN IN RIGHT LOWER LEG: Primary | ICD-10-CM

## 2023-09-08 DIAGNOSIS — Z87.898 HISTORY OF BRADYCARDIA: ICD-10-CM

## 2023-09-08 PROBLEM — I73.00 RAYNAUD'S PHENOMENON: Status: ACTIVE | Noted: 2018-01-01

## 2023-09-08 PROBLEM — G89.29 CHRONIC RIGHT SHOULDER PAIN: Status: RESOLVED | Noted: 2017-11-08 | Resolved: 2023-09-08

## 2023-09-08 PROBLEM — M25.511 CHRONIC RIGHT SHOULDER PAIN: Status: RESOLVED | Noted: 2017-11-08 | Resolved: 2023-09-08

## 2023-09-08 PROBLEM — M35.00 SJOGREN'S SYNDROME (HCC): Status: ACTIVE | Noted: 2019-01-01

## 2023-09-08 PROCEDURE — 1036F TOBACCO NON-USER: CPT | Performed by: NURSE PRACTITIONER

## 2023-09-08 PROCEDURE — 93971 EXTREMITY STUDY: CPT

## 2023-09-08 PROCEDURE — 1123F ACP DISCUSS/DSCN MKR DOCD: CPT | Performed by: NURSE PRACTITIONER

## 2023-09-08 PROCEDURE — G8399 PT W/DXA RESULTS DOCUMENT: HCPCS | Performed by: NURSE PRACTITIONER

## 2023-09-08 PROCEDURE — 3017F COLORECTAL CA SCREEN DOC REV: CPT | Performed by: NURSE PRACTITIONER

## 2023-09-08 PROCEDURE — 93000 ELECTROCARDIOGRAM COMPLETE: CPT | Performed by: NURSE PRACTITIONER

## 2023-09-08 PROCEDURE — 99214 OFFICE O/P EST MOD 30 MIN: CPT | Performed by: NURSE PRACTITIONER

## 2023-09-08 PROCEDURE — 3078F DIAST BP <80 MM HG: CPT | Performed by: NURSE PRACTITIONER

## 2023-09-08 PROCEDURE — G8427 DOCREV CUR MEDS BY ELIG CLIN: HCPCS | Performed by: NURSE PRACTITIONER

## 2023-09-08 PROCEDURE — G8420 CALC BMI NORM PARAMETERS: HCPCS | Performed by: NURSE PRACTITIONER

## 2023-09-08 PROCEDURE — 3074F SYST BP LT 130 MM HG: CPT | Performed by: NURSE PRACTITIONER

## 2023-09-08 PROCEDURE — 1090F PRES/ABSN URINE INCON ASSESS: CPT | Performed by: NURSE PRACTITIONER

## 2023-09-08 ASSESSMENT — PATIENT HEALTH QUESTIONNAIRE - PHQ9
SUM OF ALL RESPONSES TO PHQ QUESTIONS 1-9: 0
SUM OF ALL RESPONSES TO PHQ QUESTIONS 1-9: 0
SUM OF ALL RESPONSES TO PHQ9 QUESTIONS 1 & 2: 0
SUM OF ALL RESPONSES TO PHQ QUESTIONS 1-9: 0
1. LITTLE INTEREST OR PLEASURE IN DOING THINGS: 0
SUM OF ALL RESPONSES TO PHQ QUESTIONS 1-9: 0
2. FEELING DOWN, DEPRESSED OR HOPELESS: 0

## 2023-09-08 ASSESSMENT — ENCOUNTER SYMPTOMS
ABDOMINAL PAIN: 0
COLOR CHANGE: 0
NAUSEA: 0
VOMITING: 0
BACK PAIN: 0
SHORTNESS OF BREATH: 0
DIARRHEA: 0
COUGH: 0

## 2023-09-25 DIAGNOSIS — N95.1 POSTMENOPAUSAL DISORDER: ICD-10-CM

## 2023-09-25 RX ORDER — ESTRADIOL 0.1 MG/G
2 CREAM VAGINAL DAILY
Qty: 42.5 G | Refills: 0 | Status: SHIPPED | OUTPATIENT
Start: 2023-09-25

## 2023-09-29 ENCOUNTER — OFFICE VISIT (OUTPATIENT)
Dept: RHEUMATOLOGY | Age: 68
End: 2023-09-29
Payer: MEDICARE

## 2023-09-29 VITALS
DIASTOLIC BLOOD PRESSURE: 58 MMHG | SYSTOLIC BLOOD PRESSURE: 123 MMHG | WEIGHT: 139 LBS | HEART RATE: 48 BPM | BODY MASS INDEX: 23.16 KG/M2 | HEIGHT: 65 IN

## 2023-09-29 DIAGNOSIS — Z01.89 ENCOUNTER FOR LABORATORY TEST: ICD-10-CM

## 2023-09-29 DIAGNOSIS — M13.0 POLYARTHRITIS OF MULTIPLE SITES: ICD-10-CM

## 2023-09-29 DIAGNOSIS — R76.8 POSITIVE ANA (ANTINUCLEAR ANTIBODY): ICD-10-CM

## 2023-09-29 DIAGNOSIS — R76.8 POSITIVE ANA (ANTINUCLEAR ANTIBODY): Primary | ICD-10-CM

## 2023-09-29 PROCEDURE — 1123F ACP DISCUSS/DSCN MKR DOCD: CPT | Performed by: INTERNAL MEDICINE

## 2023-09-29 PROCEDURE — 1036F TOBACCO NON-USER: CPT | Performed by: INTERNAL MEDICINE

## 2023-09-29 PROCEDURE — 1090F PRES/ABSN URINE INCON ASSESS: CPT | Performed by: INTERNAL MEDICINE

## 2023-09-29 PROCEDURE — G8427 DOCREV CUR MEDS BY ELIG CLIN: HCPCS | Performed by: INTERNAL MEDICINE

## 2023-09-29 PROCEDURE — G8420 CALC BMI NORM PARAMETERS: HCPCS | Performed by: INTERNAL MEDICINE

## 2023-09-29 PROCEDURE — 3078F DIAST BP <80 MM HG: CPT | Performed by: INTERNAL MEDICINE

## 2023-09-29 PROCEDURE — 3074F SYST BP LT 130 MM HG: CPT | Performed by: INTERNAL MEDICINE

## 2023-09-29 PROCEDURE — 99214 OFFICE O/P EST MOD 30 MIN: CPT | Performed by: INTERNAL MEDICINE

## 2023-09-29 PROCEDURE — 3017F COLORECTAL CA SCREEN DOC REV: CPT | Performed by: INTERNAL MEDICINE

## 2023-09-29 PROCEDURE — G8399 PT W/DXA RESULTS DOCUMENT: HCPCS | Performed by: INTERNAL MEDICINE

## 2023-09-29 ASSESSMENT — JOINT PAIN
TOTAL NUMBER OF TENDER JOINTS: 8
TOTAL NUMBER OF SWOLLEN JOINTS: 7

## 2023-09-29 ASSESSMENT — ROUTINE ASSESSMENT OF PATIENT INDEX DATA (RAPID3)
ON A SCALE OF ONE TO TEN, HOW DIFFICULT WAS IT FOR YOU TO COMPLETE THE LISTED DAILY PHYSICAL TASKS OVER THE LAST WEEK: 0.0
ON A SCALE OF ONE TO TEN, CONSIDERING ALL THE WAYS IN WHICH ILLNESS AND HEALTH CONDITIONS MAY AFFECT YOU AT THIS TIME, PLEASE INDICATE BELOW HOW YOU ARE DOING:: 7
WHEN YOU AWAKENED IN THE MORNING OVER THE LAST WEEK, PLEASE INDICATE THE AMOUNT OF TIME IT TAKES UNTIL YOU ARE AS LIMBER AS YOU WILL BE FOR THE DAY: < 10 MIN
ON A SCALE OF ONE TO TEN, HOW MUCH PAIN HAVE YOU HAD BECAUSE OF YOUR CONDITION OVER THE PAST WEEK?: 7
ON A SCALE OF ONE TO TEN, HOW MUCH OF A PROBLEM HAS UNUSUAL FATIGUE OR TIREDNESS BEEN FOR YOU OVER THE PAST WEEK?: 5

## 2023-09-29 NOTE — PROGRESS NOTES
Mariangel Hernandez M.D.  [address] [place] [address]., [address] [facility]., [address] [place] [address]  Office : [phone], Fax: [phone] OFFICE VISIT NOTE  Date of Visit:  2023 9:06 AM    Patient Information:  Name:  Anabell Cochran  :  1955  Age:  70 y.o. Gender:  female      Ms. Rena Gabriel is here today for follow-up of OA and review results from the last visit. Last visit:  2023       History of Present Illness: On talking to the patient today she states that she now has a blood clot in her right leg and is on Eliquis currently. Pain management has done injections into her hips which did help for a while with pain relief. Declined the flu vaccine today but will get the vaccine in a couple of weeks time. Currently she does complain of an occasional cough with congestion with no associated fever or chills. Her other current joint complaints are as mentioned below. Since the last visit, patient is feeling \"fair\". 7/10  Location:  Some lower back pain. Bilateral hip and groin pain. Some pain with occasional swelling of the PIP and DIP joints with no difficulty opening jars or buttoning and unbuttoning. Quality:  Deep achy pain. Modifying Factors:  1st thing in the morning the stiffness is the worst, movement helps and doing too much worsens the pain. Associated Symptoms:  Intermittent pain with no tingling and numbness from the hips to the knees anteriorly. Intermittent tingling and numbness of his toes.          2023     8:00 AM   DMARD/Biologic   AM Stiffness < 10 min   Pain 7   Fatigue 5   MDHAQ 0   Patient Global Score 7   Medication Name Other   Other Medication Mobic     Last TB screen: <5 years  TB result:  Negative     Current dose of steroids: none  How long on current dose of steroids: NA  How long on continuous steroid therapy: NA     Past DMARDs, if applicable (methotrexate, plaquenil/hydroxychloroquine, sulfasalazine,

## 2023-10-02 LAB
ENA SS-A AB SER-ACNC: 1.1 AI (ref 0–0.9)
ENA SS-B AB SER-ACNC: <0.2 AI (ref 0–0.9)

## 2023-10-06 DIAGNOSIS — I82.451 ACUTE DEEP VEIN THROMBOSIS (DVT) OF RIGHT PERONEAL VEIN (HCC): ICD-10-CM

## 2023-10-11 NOTE — PROGRESS NOTES
NEW PATIENT INTAKE    Referral Diagnosis: Acute deep vein thrombosis (DVT) of right peroneal vein    Referring Provider: ZEINAB Trevizo - CNP    Primary Care Provider: Mita Galvan MD    Family History of Cancer/ Hematology Disorders: Family history significant for father with prostate cancer    Presenting Symptoms: DVT    Chronological History of Pertinent Events:     69-year-old white female    Followed by Rheumatology for OA    9/8/23 - Met with PCP ([facility])  c/o R calf pain x 5 days; denies any trauma to RLE; denies redness, warmth or swelling  Discussed results of venous US showing DVT to right infrapopliteal peroneal vein, as well as superficial thrombus to the lesser saphenous vein. Rx: Eliquis sent to pharmacy  Referral to hematology placed. 9/8/23 - Right lower extremity deep venous ultrasound ([facility])  IMPRESSION:  The study is positive for occlusive thrombus within the infrapopliteal peroneal vein. Additional superficial thrombus throughout the lesser saphenous vein. 9/8/23 - EKG 12 Lead was WNL. ([facility])    A referral has been placed with [facility] for a Medical Hematology consultation and treatment.       Notes from Referring Provider: N/A    Presented at Tumor Board: No    Other Pertinent Information: N/A

## 2023-10-12 ENCOUNTER — HOSPITAL ENCOUNTER (OUTPATIENT)
Dept: LAB | Age: 68
Discharge: HOME | End: 2023-10-12
Payer: MEDICARE

## 2023-10-12 ENCOUNTER — OFFICE VISIT (OUTPATIENT)
Dept: ONCOLOGY | Age: 68
End: 2023-10-12
Payer: MEDICARE

## 2023-10-12 VITALS
BODY MASS INDEX: 23.49 KG/M2 | SYSTOLIC BLOOD PRESSURE: 126 MMHG | HEIGHT: 65 IN | TEMPERATURE: 98.2 F | RESPIRATION RATE: 16 BRPM | DIASTOLIC BLOOD PRESSURE: 60 MMHG | OXYGEN SATURATION: 99 % | WEIGHT: 141 LBS | HEART RATE: 59 BPM

## 2023-10-12 DIAGNOSIS — I82.491 DEEP VEIN THROMBOSIS (DVT) OF OTHER VEIN OF RIGHT LOWER EXTREMITY, UNSPECIFIED CHRONICITY (HCC): Primary | ICD-10-CM

## 2023-10-12 DIAGNOSIS — I82.491 DEEP VEIN THROMBOSIS (DVT) OF OTHER VEIN OF RIGHT LOWER EXTREMITY, UNSPECIFIED CHRONICITY (HCC): ICD-10-CM

## 2023-10-12 DIAGNOSIS — E55.9 VITAMIN D DEFICIENCY: ICD-10-CM

## 2023-10-12 DIAGNOSIS — D68.59 PRIMARY HYPERCOAGULABLE STATE (HCC): ICD-10-CM

## 2023-10-12 DIAGNOSIS — I10 HYPERTENSION, UNSPECIFIED TYPE: ICD-10-CM

## 2023-10-12 DIAGNOSIS — D47.2 MGUS (MONOCLONAL GAMMOPATHY OF UNKNOWN SIGNIFICANCE): ICD-10-CM

## 2023-10-12 LAB
25(OH)D3 SERPL-MCNC: 42.2 NG/ML (ref 30–100)
ALBUMIN SERPL-MCNC: 3.3 G/DL (ref 3.2–4.6)
ALBUMIN/GLOB SERPL: 0.8 (ref 0.4–1.6)
ALP SERPL-CCNC: 79 U/L (ref 50–136)
ALT SERPL-CCNC: 25 U/L (ref 12–65)
ANION GAP SERPL CALC-SCNC: 3 MMOL/L (ref 2–11)
AST SERPL-CCNC: 17 U/L (ref 15–37)
BASOPHILS # BLD: 0 K/UL (ref 0–0.2)
BASOPHILS NFR BLD: 1 % (ref 0–2)
BILIRUB SERPL-MCNC: 0.2 MG/DL (ref 0.2–1.1)
BUN SERPL-MCNC: 16 MG/DL (ref 8–23)
CALCIUM SERPL-MCNC: 8.9 MG/DL (ref 8.3–10.4)
CHLORIDE SERPL-SCNC: 105 MMOL/L (ref 101–110)
CO2 SERPL-SCNC: 31 MMOL/L (ref 21–32)
CREAT SERPL-MCNC: 0.6 MG/DL (ref 0.6–1)
D DIMER PPP FEU-MCNC: 0.37 UG/ML(FEU)
DIFFERENTIAL METHOD BLD: NORMAL
EOSINOPHIL # BLD: 0.1 K/UL (ref 0–0.8)
EOSINOPHIL NFR BLD: 2 % (ref 0.5–7.8)
ERYTHROCYTE [DISTWIDTH] IN BLOOD BY AUTOMATED COUNT: 13.7 % (ref 11.9–14.6)
FERRITIN SERPL-MCNC: 40 NG/ML (ref 8–388)
GLOBULIN SER CALC-MCNC: 3.9 G/DL (ref 2.8–4.5)
GLUCOSE SERPL-MCNC: 106 MG/DL (ref 65–100)
HCT VFR BLD AUTO: 43.6 % (ref 35.8–46.3)
HGB BLD-MCNC: 14.1 G/DL (ref 11.7–15.4)
IMM GRANULOCYTES # BLD AUTO: 0 K/UL (ref 0–0.5)
IMM GRANULOCYTES NFR BLD AUTO: 1 % (ref 0–5)
LYMPHOCYTES # BLD: 1.8 K/UL (ref 0.5–4.6)
LYMPHOCYTES NFR BLD: 29 % (ref 13–44)
MCH RBC QN AUTO: 31.1 PG (ref 26.1–32.9)
MCHC RBC AUTO-ENTMCNC: 32.3 G/DL (ref 31.4–35)
MCV RBC AUTO: 96 FL (ref 82–102)
MONOCYTES # BLD: 0.7 K/UL (ref 0.1–1.3)
MONOCYTES NFR BLD: 11 % (ref 4–12)
NEUTS SEG # BLD: 3.6 K/UL (ref 1.7–8.2)
NEUTS SEG NFR BLD: 56 % (ref 43–78)
NRBC # BLD: 0 K/UL (ref 0–0.2)
PLATELET # BLD AUTO: 290 K/UL (ref 150–450)
PMV BLD AUTO: 9.7 FL (ref 9.4–12.3)
POTASSIUM SERPL-SCNC: 3.7 MMOL/L (ref 3.5–5.1)
PROT SERPL-MCNC: 7.2 G/DL (ref 6.3–8.2)
RBC # BLD AUTO: 4.54 M/UL (ref 4.05–5.2)
SODIUM SERPL-SCNC: 139 MMOL/L (ref 133–143)
VIT B12 SERPL-MCNC: 785 PG/ML (ref 193–986)
WBC # BLD AUTO: 6.3 K/UL (ref 4.3–11.1)

## 2023-10-12 PROCEDURE — 83520 IMMUNOASSAY QUANT NOS NONAB: CPT

## 2023-10-12 PROCEDURE — 85240 CLOT FACTOR VIII AHG 1 STAGE: CPT

## 2023-10-12 PROCEDURE — 3074F SYST BP LT 130 MM HG: CPT | Performed by: INTERNAL MEDICINE

## 2023-10-12 PROCEDURE — 1036F TOBACCO NON-USER: CPT | Performed by: INTERNAL MEDICINE

## 2023-10-12 PROCEDURE — 86147 CARDIOLIPIN ANTIBODY EA IG: CPT

## 2023-10-12 PROCEDURE — G8399 PT W/DXA RESULTS DOCUMENT: HCPCS | Performed by: INTERNAL MEDICINE

## 2023-10-12 PROCEDURE — G8427 DOCREV CUR MEDS BY ELIG CLIN: HCPCS | Performed by: INTERNAL MEDICINE

## 2023-10-12 PROCEDURE — G8420 CALC BMI NORM PARAMETERS: HCPCS | Performed by: INTERNAL MEDICINE

## 2023-10-12 PROCEDURE — 84165 PROTEIN E-PHORESIS SERUM: CPT

## 2023-10-12 PROCEDURE — 86148 ANTI-PHOSPHOLIPID ANTIBODY: CPT

## 2023-10-12 PROCEDURE — 85379 FIBRIN DEGRADATION QUANT: CPT

## 2023-10-12 PROCEDURE — 1123F ACP DISCUSS/DSCN MKR DOCD: CPT | Performed by: INTERNAL MEDICINE

## 2023-10-12 PROCEDURE — 82607 VITAMIN B-12: CPT

## 2023-10-12 PROCEDURE — 82784 ASSAY IGA/IGD/IGG/IGM EACH: CPT

## 2023-10-12 PROCEDURE — G8484 FLU IMMUNIZE NO ADMIN: HCPCS | Performed by: INTERNAL MEDICINE

## 2023-10-12 PROCEDURE — 85025 COMPLETE CBC W/AUTO DIFF WBC: CPT

## 2023-10-12 PROCEDURE — 80053 COMPREHEN METABOLIC PANEL: CPT

## 2023-10-12 PROCEDURE — 3078F DIAST BP <80 MM HG: CPT | Performed by: INTERNAL MEDICINE

## 2023-10-12 PROCEDURE — 3017F COLORECTAL CA SCREEN DOC REV: CPT | Performed by: INTERNAL MEDICINE

## 2023-10-12 PROCEDURE — 82728 ASSAY OF FERRITIN: CPT

## 2023-10-12 PROCEDURE — 36415 COLL VENOUS BLD VENIPUNCTURE: CPT

## 2023-10-12 PROCEDURE — 86334 IMMUNOFIX E-PHORESIS SERUM: CPT

## 2023-10-12 PROCEDURE — 82306 VITAMIN D 25 HYDROXY: CPT

## 2023-10-12 PROCEDURE — 83090 ASSAY OF HOMOCYSTEINE: CPT

## 2023-10-12 PROCEDURE — 1090F PRES/ABSN URINE INCON ASSESS: CPT | Performed by: INTERNAL MEDICINE

## 2023-10-12 PROCEDURE — 99205 OFFICE O/P NEW HI 60 MIN: CPT | Performed by: INTERNAL MEDICINE

## 2023-10-12 ASSESSMENT — PATIENT HEALTH QUESTIONNAIRE - PHQ9
2. FEELING DOWN, DEPRESSED OR HOPELESS: 0
1. LITTLE INTEREST OR PLEASURE IN DOING THINGS: 0
SUM OF ALL RESPONSES TO PHQ QUESTIONS 1-9: 0
SUM OF ALL RESPONSES TO PHQ9 QUESTIONS 1 & 2: 0

## 2023-10-12 NOTE — PATIENT INSTRUCTIONS
Patient Instructions from Today's Visit    Reason for Visit:  New Patient    Diagnosis Information:  https://www.[employer]/. net/about-us/asco-answers-patient-education-materials/tlha-vlfscbm-xtvb-sheets      Plan: You will have some blood work done today  We will do a partial work up today to see how long you will need to stay on a blood thinner    Follow Up: We will bring you back in November for a follow up with  and lab work prior. Recent Lab Results:  Lab work done today downstairs    Treatment Summary has been discussed and given to patient: n/a        -------------------------------------------------------------------------------------------------------------------  Please call our office at [phone] if you have any  of the following symptoms:   Fever of 100.5 or greater  Chills  Shortness of breath  Swelling or pain in one leg    After office hours an answering service is available and will contact a provider for emergencies or if you are experiencing any of the above symptoms. Patient has  MyChart. My Chart log in information explained on the after visit summary printout at the [address] desk.     Courtney Xie MA

## 2023-10-12 NOTE — PROGRESS NOTES
Lambda. I spent 62 minutes on the day of the visit, managing the care of this patient. PLAN:     She should continue taking Eliquis 5 mg BID. I will arrange for her to undergo a skeletal survey, her partial Hypercoagulable work-up is pending; at her next clinic visit I will re-check her CBC, CMP, D-dimers, Beta-2 microglobulin level, Kappa/Lambda level and SPEP with [employer]. Thank you for allowing me to participate in the care of this patient.       En Bland MD  Hematology/BMT

## 2023-10-13 ENCOUNTER — TELEPHONE (OUTPATIENT)
Dept: ONCOLOGY | Age: 68
End: 2023-10-13

## 2023-10-13 DIAGNOSIS — I82.451 ACUTE DEEP VEIN THROMBOSIS (DVT) OF RIGHT PERONEAL VEIN (HCC): ICD-10-CM

## 2023-10-13 NOTE — TELEPHONE ENCOUNTER
Message to Dr. Tisha Boswell team for direction on eliquis fill  Dr. Ana Carrera has sent in prescription for eliquis to [address] [place] [address]. Patient had requested that first month be sent to [facility] because she will run out before mail order can send it.    Call to patient to notify that I have sent a 1 month prescription to [facility] and on Monday will send another to mail order with a start date of 1 month

## 2023-10-13 NOTE — TELEPHONE ENCOUNTER
Physician provider: Kriss Sun MD  Reason for today's call: Medication refill pharm change  Last office visit: n/a    Patient notified that their information will be routed to the [facility] clinical triage team for review. Patient is advised that they will receive a phone call from the triage department. If symptoms worsen before receiving a call back, the patient has been advised to proceed to the nearest ED. Pt called asking to have 1st month of medication: Eliquis 5 MG to be sent to: [facility] on [address] in [place]. Pt states the order pharm will not have medication arriving to her prior to running out.

## 2023-10-14 LAB
CARDIOLIPIN IGA SER IA-ACNC: NORMAL APL U/ML
CARDIOLIPIN IGG SER IA-ACNC: NORMAL GPL U/ML
CARDIOLIPIN IGM SER IA-ACNC: NORMAL MPL U/ML
FACT VIII ACT/NOR PPP: 163 % (ref 56–140)
HCYS SERPL-SCNC: 6.6 UMOL/L (ref 0–17.2)
P ETHANOLAMINE AB, IGA: NORMAL U/ML
P ETHANOLAMINE AB, IGG: NORMAL U/ML
P ETHANOLAMINE AB, IGM: NORMAL U/ML
P GLYCEROL IGA: NORMAL U/ML
P GLYCEROL IGG: NORMAL U/ML
P GLYCEROL IGM: NORMAL U/ML
PS IGA SER-ACNC: <1 APS UNITS (ref 0–19)
PS IGG SER-ACNC: NORMAL UNITS
PS IGM SER-ACNC: NORMAL UNITS

## 2023-10-16 LAB
F5 P.R506Q BLD/T QL: NORMAL
IMP & REVIEW OF LAB RESULTS: NORMAL
IMP & REVIEW OF LAB RESULTS: NORMAL
MTHFR GENE MUT ANL BLD/T: NORMAL

## 2023-10-18 LAB
ALBUMIN SERPL ELPH-MCNC: 3.7 G/DL (ref 2.9–4.4)
ALBUMIN/GLOB SERPL: 1.3 (ref 0.7–1.7)
ALPHA1 GLOB SERPL ELPH-MCNC: 0.2 G/DL (ref 0–0.4)
ALPHA2 GLOB SERPL ELPH-MCNC: 0.7 G/DL (ref 0.4–1)
B-GLOBULIN SERPL ELPH-MCNC: 0.9 G/DL (ref 0.7–1.3)
F2 GENE MUT ANL BLD/T: NORMAL
GAMMA GLOB SERPL ELPH-MCNC: 1.1 G/DL (ref 0.4–1.8)
GLOBULIN SER-MCNC: 3 G/DL (ref 2.2–3.9)
IGA SERPL-MCNC: 328 MG/DL (ref 87–352)
IGG SERPL-MCNC: 1017 MG/DL (ref 586–1602)
IGM SERPL-MCNC: 122 MG/DL (ref 26–217)
IMP & REVIEW OF LAB RESULTS: NORMAL
INTERPRETATION SERPL IEP-IMP: ABNORMAL
M PROTEIN SERPL ELPH-MCNC: 0.2 G/DL
PROT SERPL-MCNC: 6.7 G/DL (ref 6–8.5)

## 2023-10-20 DIAGNOSIS — I82.451 ACUTE DEEP VEIN THROMBOSIS (DVT) OF RIGHT PERONEAL VEIN (HCC): ICD-10-CM

## 2023-10-20 LAB
CARDIOLIPIN IGA SER IA-ACNC: <9 APL U/ML (ref 0–11)
CARDIOLIPIN IGG SER IA-ACNC: <9 GPL U/ML (ref 0–14)
CARDIOLIPIN IGM SER IA-ACNC: 9 MPL U/ML (ref 0–12)
P ETHANOLAMINE AB, IGA: 2.1 U/ML
P ETHANOLAMINE AB, IGG: 0.2 U/ML
P ETHANOLAMINE AB, IGM: 3.4 U/ML
P GLYCEROL IGA: 2.1 U/ML
P GLYCEROL IGG: 1.9 U/ML
P GLYCEROL IGM: 3.5 U/ML
PS IGA SER-ACNC: <1 APS UNITS (ref 0–19)
PS IGG SER-ACNC: <9 UNITS (ref 0–30)
PS IGM SER-ACNC: 28 UNITS (ref 0–30)

## 2023-10-27 ENCOUNTER — HOSPITAL ENCOUNTER (OUTPATIENT)
Dept: GENERAL RADIOLOGY | Age: 68
End: 2023-10-27
Payer: MEDICARE

## 2023-10-27 DIAGNOSIS — D47.2 MGUS (MONOCLONAL GAMMOPATHY OF UNKNOWN SIGNIFICANCE): ICD-10-CM

## 2023-10-27 PROCEDURE — 77075 RADEX OSSEOUS SURVEY COMPL: CPT

## 2023-11-01 ENCOUNTER — TELEPHONE (OUTPATIENT)
Dept: INTERNAL MEDICINE CLINIC | Facility: CLINIC | Age: 68
End: 2023-11-01

## 2023-11-01 DIAGNOSIS — K21.9 GASTROESOPHAGEAL REFLUX DISEASE, UNSPECIFIED WHETHER ESOPHAGITIS PRESENT: ICD-10-CM

## 2023-11-01 RX ORDER — OMEPRAZOLE 20 MG/1
20 CAPSULE, DELAYED RELEASE ORAL DAILY
Qty: 90 CAPSULE | Refills: 10 | OUTPATIENT
Start: 2023-11-01

## 2023-11-15 ENCOUNTER — TELEPHONE (OUTPATIENT)
Dept: ONCOLOGY | Age: 68
End: 2023-11-15

## 2023-11-15 NOTE — TELEPHONE ENCOUNTER
Physician provider: Sierra Tellez MD  Reason for today's call: Med Refill  Last office visit:n/a    Patient notified that their information will be routed to the [facility] clinical triage team for review. Patient is advised that they will receive a phone call from the triage department. If symptoms worsen before receiving a call back, the patient has been advised to proceed to the nearest ED. Pt state she should be receiving her Eliquis on 11/16 but she just took her last one this morning & suppose to take 2 more times. Pt would like to verify if it would be okay if she miss the 2 doses.

## 2023-11-15 NOTE — TELEPHONE ENCOUNTER
Patient informed MD aware of missed doses and will see patient tomorrow during previously scheduled OV. Verbalized understanding.

## 2023-11-15 NOTE — TELEPHONE ENCOUNTER
Physician provider: Alyson Hirsch MD  Reason for today's call: Medication clarity  Last office visit:n/a    Patient notified that their information will be routed to the [facility] clinical triage team for review. Patient is advised that they will receive a phone call from the triage department. If symptoms worsen before receiving a call back, the patient has been advised to proceed to the nearest ED. Pt state she should be receiving her Eliquis on 11/16 but she just took her last one this morning & suppose to take 2 more times. Pt would like to verify if it would be okay if she miss the 2 doses.

## 2023-11-16 ENCOUNTER — OFFICE VISIT (OUTPATIENT)
Dept: ONCOLOGY | Age: 68
End: 2023-11-16
Payer: MEDICARE

## 2023-11-16 ENCOUNTER — HOSPITAL ENCOUNTER (OUTPATIENT)
Dept: LAB | Age: 68
Discharge: HOME | End: 2023-11-16
Payer: MEDICARE

## 2023-11-16 VITALS
BODY MASS INDEX: 23.99 KG/M2 | OXYGEN SATURATION: 100 % | TEMPERATURE: 98 F | WEIGHT: 144 LBS | DIASTOLIC BLOOD PRESSURE: 71 MMHG | RESPIRATION RATE: 16 BRPM | SYSTOLIC BLOOD PRESSURE: 122 MMHG | HEART RATE: 52 BPM | HEIGHT: 65 IN

## 2023-11-16 DIAGNOSIS — D68.59 PRIMARY HYPERCOAGULABLE STATE (HCC): Primary | ICD-10-CM

## 2023-11-16 DIAGNOSIS — I82.491 DEEP VEIN THROMBOSIS (DVT) OF OTHER VEIN OF RIGHT LOWER EXTREMITY, UNSPECIFIED CHRONICITY (HCC): ICD-10-CM

## 2023-11-16 DIAGNOSIS — I10 HYPERTENSION, UNSPECIFIED TYPE: ICD-10-CM

## 2023-11-16 DIAGNOSIS — D47.2 MGUS (MONOCLONAL GAMMOPATHY OF UNKNOWN SIGNIFICANCE): ICD-10-CM

## 2023-11-16 DIAGNOSIS — D68.59 PRIMARY HYPERCOAGULABLE STATE (HCC): ICD-10-CM

## 2023-11-16 DIAGNOSIS — I82.491 DEEP VEIN THROMBOSIS (DVT) OF OTHER VEIN OF RIGHT LOWER EXTREMITY, UNSPECIFIED CHRONICITY (HCC): Primary | ICD-10-CM

## 2023-11-16 LAB
ALBUMIN SERPL-MCNC: 3.9 G/DL (ref 3.2–4.6)
ALBUMIN/GLOB SERPL: 1.2 (ref 0.4–1.6)
ALP SERPL-CCNC: 72 U/L (ref 50–136)
ALT SERPL-CCNC: 22 U/L (ref 12–65)
ANION GAP SERPL CALC-SCNC: 2 MMOL/L (ref 2–11)
AST SERPL-CCNC: 14 U/L (ref 15–37)
BASOPHILS # BLD: 0 K/UL (ref 0–0.2)
BASOPHILS NFR BLD: 1 % (ref 0–2)
BILIRUB SERPL-MCNC: 0.4 MG/DL (ref 0.2–1.1)
BUN SERPL-MCNC: 16 MG/DL (ref 8–23)
CALCIUM SERPL-MCNC: 9.1 MG/DL (ref 8.3–10.4)
CHLORIDE SERPL-SCNC: 105 MMOL/L (ref 101–110)
CO2 SERPL-SCNC: 31 MMOL/L (ref 21–32)
CREAT SERPL-MCNC: 0.7 MG/DL (ref 0.6–1)
D DIMER PPP FEU-MCNC: <0.27 UG/ML(FEU)
DIFFERENTIAL METHOD BLD: NORMAL
EOSINOPHIL # BLD: 0.1 K/UL (ref 0–0.8)
EOSINOPHIL NFR BLD: 1 % (ref 0.5–7.8)
ERYTHROCYTE [DISTWIDTH] IN BLOOD BY AUTOMATED COUNT: 13.4 % (ref 11.9–14.6)
GLOBULIN SER CALC-MCNC: 3.2 G/DL (ref 2.8–4.5)
GLUCOSE SERPL-MCNC: 145 MG/DL (ref 65–100)
HCT VFR BLD AUTO: 40.6 % (ref 35.8–46.3)
HGB BLD-MCNC: 13.4 G/DL (ref 11.7–15.4)
IMM GRANULOCYTES # BLD AUTO: 0 K/UL (ref 0–0.5)
IMM GRANULOCYTES NFR BLD AUTO: 1 % (ref 0–5)
LYMPHOCYTES # BLD: 2 K/UL (ref 0.5–4.6)
LYMPHOCYTES NFR BLD: 28 % (ref 13–44)
MCH RBC QN AUTO: 31.9 PG (ref 26.1–32.9)
MCHC RBC AUTO-ENTMCNC: 33 G/DL (ref 31.4–35)
MCV RBC AUTO: 96.7 FL (ref 82–102)
MONOCYTES # BLD: 0.5 K/UL (ref 0.1–1.3)
MONOCYTES NFR BLD: 7 % (ref 4–12)
NEUTS SEG # BLD: 4.3 K/UL (ref 1.7–8.2)
NEUTS SEG NFR BLD: 62 % (ref 43–78)
NRBC # BLD: 0 K/UL (ref 0–0.2)
PLATELET # BLD AUTO: 249 K/UL (ref 150–450)
PMV BLD AUTO: 10.3 FL (ref 9.4–12.3)
POTASSIUM SERPL-SCNC: 3.8 MMOL/L (ref 3.5–5.1)
PROT SERPL-MCNC: 7.1 G/DL (ref 6.3–8.2)
RBC # BLD AUTO: 4.2 M/UL (ref 4.05–5.2)
SODIUM SERPL-SCNC: 138 MMOL/L (ref 133–143)
WBC # BLD AUTO: 7 K/UL (ref 4.3–11.1)

## 2023-11-16 PROCEDURE — 3074F SYST BP LT 130 MM HG: CPT | Performed by: INTERNAL MEDICINE

## 2023-11-16 PROCEDURE — 86334 IMMUNOFIX E-PHORESIS SERUM: CPT

## 2023-11-16 PROCEDURE — 1090F PRES/ABSN URINE INCON ASSESS: CPT | Performed by: INTERNAL MEDICINE

## 2023-11-16 PROCEDURE — 82784 ASSAY IGA/IGD/IGG/IGM EACH: CPT

## 2023-11-16 PROCEDURE — G8427 DOCREV CUR MEDS BY ELIG CLIN: HCPCS | Performed by: INTERNAL MEDICINE

## 2023-11-16 PROCEDURE — 1036F TOBACCO NON-USER: CPT | Performed by: INTERNAL MEDICINE

## 2023-11-16 PROCEDURE — G8420 CALC BMI NORM PARAMETERS: HCPCS | Performed by: INTERNAL MEDICINE

## 2023-11-16 PROCEDURE — 36415 COLL VENOUS BLD VENIPUNCTURE: CPT

## 2023-11-16 PROCEDURE — 85379 FIBRIN DEGRADATION QUANT: CPT

## 2023-11-16 PROCEDURE — 3017F COLORECTAL CA SCREEN DOC REV: CPT | Performed by: INTERNAL MEDICINE

## 2023-11-16 PROCEDURE — 1123F ACP DISCUSS/DSCN MKR DOCD: CPT | Performed by: INTERNAL MEDICINE

## 2023-11-16 PROCEDURE — 82232 ASSAY OF BETA-2 PROTEIN: CPT

## 2023-11-16 PROCEDURE — G8484 FLU IMMUNIZE NO ADMIN: HCPCS | Performed by: INTERNAL MEDICINE

## 2023-11-16 PROCEDURE — 85025 COMPLETE CBC W/AUTO DIFF WBC: CPT

## 2023-11-16 PROCEDURE — 80053 COMPREHEN METABOLIC PANEL: CPT

## 2023-11-16 PROCEDURE — 84165 PROTEIN E-PHORESIS SERUM: CPT

## 2023-11-16 PROCEDURE — G8399 PT W/DXA RESULTS DOCUMENT: HCPCS | Performed by: INTERNAL MEDICINE

## 2023-11-16 PROCEDURE — 99215 OFFICE O/P EST HI 40 MIN: CPT | Performed by: INTERNAL MEDICINE

## 2023-11-16 PROCEDURE — 83521 IG LIGHT CHAINS FREE EACH: CPT

## 2023-11-16 PROCEDURE — 3078F DIAST BP <80 MM HG: CPT | Performed by: INTERNAL MEDICINE

## 2023-11-16 RX ORDER — FOLIC ACID-PYRIDOXINE-CYANOCOBALAMIN TAB 2.5-25-2 MG 2.5-25-2 MG
1 TAB ORAL DAILY
Qty: 90 TABLET | Refills: 3 | Status: SHIPPED | OUTPATIENT
Start: 2023-11-16 | End: 2024-11-10

## 2023-11-16 RX ORDER — CEPHALEXIN 250 MG/1
CAPSULE ORAL
COMMUNITY
Start: 2023-10-31

## 2023-11-16 NOTE — PROGRESS NOTES
[address] [facility], [address]  Phone: [phone]           11/16/2023  Genaro Howard  1955  [id number]        Genaro Howard is a 70year old  female who has returned to my clinic for a follow-up visit; she was initially referred to me by Dr. Gustavo oRdriguez. In 9/2023 she was found to have a right leg DVT, her partial Hypercoagulable work-up revealed an elevated Factor VIII level and compound heterozygosity for the MTHFR mutation without Hyperhomocysteinemia, she is presently being anti-coagulated with Eliquis. In 10/2023 she was diagnosed with MGUS, IgG Lambda. ALLERGIES:    No known drug allergies. FAMILY HISTORY:    Her daughter had a Pulmonary Embolism. SOCIAL HISTORY:     She is  and lives with her . She used to work as a . She stopped smoking cigarettes in 1/1991. PAST MEDICAL HISTORY:    Hypertension, Anxiety Disorder, Iron Deficiency Anemia, GERD, Allergic Rhinitis, Osteoarthritis, Depression, Migraines, Hyperlipidemia, MGUS, Sjogren's Syndrome, Raynaud's phenomenon and an underlying Hypercoagulable Disorder. ROS:  The patient complained of fatigue and dry mouth; all other systems negative. PHYSICAL EXAM:   The patient was alert, awake and oriented, no acute distress was noted. Oral examination did not reveal any mucosal lesions. Lymph node examination did not reveal any adenopathy. Neck examination revealed a supple neck, no thyromegaly or masses were noted. Chest examination revealed normal vesicular breath sounds. Heart examination revealed S-1 and S-2 with a 2/6 systolic murmur. Abdominal examination revealed a non-tender abdomen, bowel sounds were positive, no organomegaly could be appreciated. Examination of the extremities did not reveal any tenderness or erythema. Examination of the skin did not reveal any lesions. KPS:  90.         LABORATORY INVESTIGATIONS:  CBC

## 2023-11-16 NOTE — PATIENT INSTRUCTIONS
Patient Instructions from Today's Visit    Reason for Visit:  Follow Up    Diagnosis Information:  [email or web address] net/about-us/asco-answers-patient-education-materials/xtal-zuglnue-fkra-sheets    Plan:  Lab work looks stable today  Your Skeletal Survey came back normal  We will continue to monitor you for the DVT and the MGUS  Another option for reflux is famotidine  We found that your at risk for your Homocysteine level rising, so we will send in a prescription that helps lower this level.    -Folbic  We would like you to continue taking Eliquis until March 2024    Follow Up: We will bring you back in January for a follow up with  and lab work prior.     Recent Lab Results:  Hospital Outpatient Visit on 11/16/2023   Component Date Value Ref Range Status    WBC 11/16/2023 7.0  4.3 - 11.1 K/uL Final    RBC 11/16/2023 4.20  4.05 - 5.2 M/uL Final    Hemoglobin 11/16/2023 13.4  11.7 - 15.4 g/dL Final    Hematocrit 11/16/2023 40.6  35.8 - 46.3 % Final    MCV 11/16/2023 96.7  82.0 - 102.0 FL Final    MCH 11/16/2023 31.9  26.1 - 32.9 PG Final    MCHC 11/16/2023 33.0  31.4 - 35.0 g/dL Final    RDW 11/16/2023 13.4  11.9 - 14.6 % Final    Platelets 76/34/3739 249  150 - 450 K/uL Final    MPV 11/16/2023 10.3  9.4 - 12.3 FL Final    nRBC 11/16/2023 0.00  0.0 - 0.2 K/uL Final    **Note: Absolute NRBC parameter is now reported with Hemogram**    Differential Type 11/16/2023 AUTOMATED    Final    Neutrophils % 11/16/2023 62  43 - 78 % Final    Lymphocytes % 11/16/2023 28  13 - 44 % Final    Monocytes % 11/16/2023 7  4.0 - 12.0 % Final    Eosinophils % 11/16/2023 1  0.5 - 7.8 % Final    Basophils % 11/16/2023 1  0.0 - 2.0 % Final    Immature Granulocytes 11/16/2023 1  0.0 - 5.0 % Final    Neutrophils Absolute 11/16/2023 4.3  1.7 - 8.2 K/UL Final    Lymphocytes Absolute 11/16/2023 2.0  0.5 - 4.6 K/UL Final    Monocytes Absolute 11/16/2023 0.5  0.1 - 1.3 K/UL Final    Eosinophils Absolute 11/16/2023 0.1  0.0 - 0.8 K/UL

## 2023-11-17 ENCOUNTER — PATIENT MESSAGE (OUTPATIENT)
Dept: INTERNAL MEDICINE CLINIC | Facility: CLINIC | Age: 68
End: 2023-11-17

## 2023-11-18 LAB — B2 MICROGLOB SERPL-MCNC: 1.4 MG/L (ref 0.6–2.4)

## 2023-11-19 DIAGNOSIS — I10 ESSENTIAL HYPERTENSION: ICD-10-CM

## 2023-11-20 LAB
ALBUMIN SERPL ELPH-MCNC: 3.9 G/DL (ref 2.9–4.4)
ALBUMIN/GLOB SERPL: 1.5 (ref 0.7–1.7)
ALPHA1 GLOB SERPL ELPH-MCNC: 0.2 G/DL (ref 0–0.4)
ALPHA2 GLOB SERPL ELPH-MCNC: 0.6 G/DL (ref 0.4–1)
B-GLOBULIN SERPL ELPH-MCNC: 1.1 G/DL (ref 0.7–1.3)
GAMMA GLOB SERPL ELPH-MCNC: 0.8 G/DL (ref 0.4–1.8)
GLOBULIN SER-MCNC: 2.7 G/DL (ref 2.2–3.9)
IGA SERPL-MCNC: 312 MG/DL (ref 87–352)
IGG SERPL-MCNC: 965 MG/DL (ref 586–1602)
IGM SERPL-MCNC: 115 MG/DL (ref 26–217)
INTERPRETATION SERPL IEP-IMP: ABNORMAL
KAPPA LC FREE SER-MCNC: 9 MG/L (ref 3.3–19.4)
KAPPA LC FREE/LAMBDA FREE SER: 0.65 (ref 0.26–1.65)
LAMBDA LC FREE SERPL-MCNC: 13.8 MG/L (ref 5.7–26.3)
M PROTEIN SERPL ELPH-MCNC: 0.3 G/DL
PROT SERPL-MCNC: 6.6 G/DL (ref 6–8.5)

## 2023-11-20 RX ORDER — ATENOLOL 50 MG/1
50 TABLET ORAL DAILY
Qty: 90 TABLET | Refills: 3 | Status: SHIPPED | OUTPATIENT
Start: 2023-11-20

## 2023-11-20 RX ORDER — BUSPIRONE HYDROCHLORIDE 5 MG/1
5 TABLET ORAL 2 TIMES DAILY PRN
Qty: 60 TABLET | Refills: 2 | Status: SHIPPED | OUTPATIENT
Start: 2023-11-20

## 2023-11-20 NOTE — TELEPHONE ENCOUNTER
From: Adrianne Khan  To: Dr. Elvia Hsu: 2023 4:42 PM EST  Subject: Buspirone Rx    The holidays are coming and I anticipate needing a refill of Buspirone HCL 5 MG Tablet. Please send to [facility] in [place]. Thanks & Happy Thanksgiving.   Mary Keenan

## 2023-11-22 ENCOUNTER — TELEPHONE (OUTPATIENT)
Dept: INTERNAL MEDICINE CLINIC | Facility: CLINIC | Age: 68
End: 2023-11-22

## 2023-12-28 DIAGNOSIS — J34.89 NASAL VESTIBULITIS: Primary | ICD-10-CM

## 2023-12-28 DIAGNOSIS — R42 DIZZINESS: ICD-10-CM

## 2023-12-28 RX ORDER — MECLIZINE HYDROCHLORIDE 25 MG/1
25 TABLET ORAL 3 TIMES DAILY PRN
Qty: 30 TABLET | Refills: 0 | Status: SHIPPED | OUTPATIENT
Start: 2023-12-28 | End: 2024-01-07

## 2024-01-15 DIAGNOSIS — E78.2 MIXED HYPERLIPIDEMIA: ICD-10-CM

## 2024-01-15 DIAGNOSIS — I10 ESSENTIAL HYPERTENSION: Primary | ICD-10-CM

## 2024-01-15 DIAGNOSIS — M85.89 OSTEOPENIA OF MULTIPLE SITES: ICD-10-CM

## 2024-01-17 ENCOUNTER — HOSPITAL ENCOUNTER (OUTPATIENT)
Dept: LAB | Age: 69
Discharge: HOME | End: 2024-01-20

## 2024-01-17 DIAGNOSIS — D68.59 PRIMARY HYPERCOAGULABLE STATE (HCC): ICD-10-CM

## 2024-01-17 DIAGNOSIS — E78.2 MIXED HYPERLIPIDEMIA: ICD-10-CM

## 2024-01-17 DIAGNOSIS — I10 ESSENTIAL HYPERTENSION: ICD-10-CM

## 2024-01-17 DIAGNOSIS — D47.2 MGUS (MONOCLONAL GAMMOPATHY OF UNKNOWN SIGNIFICANCE): Primary | ICD-10-CM

## 2024-01-17 DIAGNOSIS — I82.491 DEEP VEIN THROMBOSIS (DVT) OF OTHER VEIN OF RIGHT LOWER EXTREMITY, UNSPECIFIED CHRONICITY (HCC): ICD-10-CM

## 2024-01-17 DIAGNOSIS — M85.89 OSTEOPENIA OF MULTIPLE SITES: ICD-10-CM

## 2024-01-17 LAB
25(OH)D3 SERPL-MCNC: 44.1 NG/ML (ref 30–100)
APPEARANCE UR: CLEAR
BACTERIA URNS QL MICRO: NEGATIVE /HPF
BASOPHILS # BLD: 0 K/UL (ref 0–0.2)
BASOPHILS NFR BLD: 1 % (ref 0–2)
BILIRUB UR QL: NEGATIVE
CASTS URNS QL MICRO: NORMAL /LPF (ref 0–2)
COLOR UR: NORMAL
DIFFERENTIAL METHOD BLD: NORMAL
EOSINOPHIL NFR BLD: 2 % (ref 0.5–7.8)
EPI CELLS #/AREA URNS HPF: NORMAL /HPF (ref 0–5)
ERYTHROCYTE [DISTWIDTH] IN BLOOD BY AUTOMATED COUNT: 13.5 % (ref 11.9–14.6)
GLUCOSE UR STRIP.AUTO-MCNC: NEGATIVE MG/DL
HCT VFR BLD AUTO: 41.6 % (ref 35.8–46.3)
HGB BLD-MCNC: 13.3 G/DL (ref 11.7–15.4)
HGB UR QL STRIP: NEGATIVE
IMM GRANULOCYTES # BLD AUTO: 0 K/UL (ref 0–0.5)
IMM GRANULOCYTES NFR BLD AUTO: 0 % (ref 0–5)
KETONES UR QL STRIP.AUTO: NEGATIVE MG/DL
LEUKOCYTE ESTERASE UR QL STRIP.AUTO: NEGATIVE
LYMPHOCYTES # BLD: 2 K/UL (ref 0.5–4.6)
LYMPHOCYTES NFR BLD: 42 % (ref 13–44)
MCH RBC QN AUTO: 30.6 PG (ref 26.1–32.9)
MCHC RBC AUTO-ENTMCNC: 32 G/DL (ref 31.4–35)
MCV RBC AUTO: 95.9 FL (ref 82–102)
MONOCYTES # BLD: 0.6 K/UL (ref 0.1–1.3)
MONOCYTES NFR BLD: 12 % (ref 4–12)
MUCOUS THREADS URNS QL MICRO: 0 /LPF
NEUTS SEG # BLD: 2.1 K/UL (ref 1.7–8.2)
NEUTS SEG NFR BLD: 43 % (ref 43–78)
NITRITE UR QL STRIP.AUTO: NEGATIVE
NRBC # BLD: 0 K/UL (ref 0–0.2)
PH UR STRIP: 7 (ref 5–9)
PLATELET # BLD AUTO: 243 K/UL (ref 150–450)
PMV BLD AUTO: 10.9 FL (ref 9.4–12.3)
PROT UR STRIP-MCNC: NEGATIVE MG/DL
RBC # BLD AUTO: 4.34 M/UL (ref 4.05–5.2)
RBC #/AREA URNS HPF: NORMAL /HPF (ref 0–5)
SP GR UR REFRACTOMETRY: 1.01 (ref 1–1.02)
URINE CULTURE IF INDICATED: NORMAL
UROBILINOGEN UR QL STRIP.AUTO: 0.2 EU/DL (ref 0.2–1)
WBC # BLD AUTO: 4.8 K/UL (ref 4.3–11.1)
WBC URNS QL MICRO: NORMAL /HPF (ref 0–4)

## 2024-01-18 ENCOUNTER — HOSPITAL ENCOUNTER (OUTPATIENT)
Dept: LAB | Age: 69
Discharge: HOME | End: 2024-01-18
Payer: MEDICARE

## 2024-01-18 ENCOUNTER — OFFICE VISIT (OUTPATIENT)
Dept: ONCOLOGY | Age: 69
End: 2024-01-18
Payer: MEDICARE

## 2024-01-18 VITALS
HEIGHT: 65 IN | HEART RATE: 50 BPM | BODY MASS INDEX: 24.43 KG/M2 | RESPIRATION RATE: 16 BRPM | WEIGHT: 146.6 LBS | SYSTOLIC BLOOD PRESSURE: 140 MMHG | OXYGEN SATURATION: 99 % | DIASTOLIC BLOOD PRESSURE: 61 MMHG | TEMPERATURE: 98.1 F

## 2024-01-18 DIAGNOSIS — I82.491 DEEP VEIN THROMBOSIS (DVT) OF OTHER VEIN OF RIGHT LOWER EXTREMITY, UNSPECIFIED CHRONICITY (HCC): ICD-10-CM

## 2024-01-18 DIAGNOSIS — D68.59 PRIMARY HYPERCOAGULABLE STATE (HCC): Primary | ICD-10-CM

## 2024-01-18 DIAGNOSIS — D68.59 PRIMARY HYPERCOAGULABLE STATE (HCC): ICD-10-CM

## 2024-01-18 DIAGNOSIS — I10 HYPERTENSION, UNSPECIFIED TYPE: ICD-10-CM

## 2024-01-18 DIAGNOSIS — D47.2 MGUS (MONOCLONAL GAMMOPATHY OF UNKNOWN SIGNIFICANCE): ICD-10-CM

## 2024-01-18 LAB
ALBUMIN SERPL-MCNC: 3.8 G/DL (ref 3.2–4.6)
ALBUMIN SERPL-MCNC: 3.8 G/DL (ref 3.2–4.6)
ALBUMIN/GLOB SERPL: 1 (ref 0.4–1.6)
ALBUMIN/GLOB SERPL: 1.3 (ref 0.4–1.6)
ALP SERPL-CCNC: 64 U/L (ref 50–136)
ALP SERPL-CCNC: 72 U/L (ref 50–136)
ALT SERPL-CCNC: 27 U/L (ref 12–65)
ALT SERPL-CCNC: 30 U/L (ref 12–65)
ANION GAP SERPL CALC-SCNC: 2 MMOL/L (ref 2–11)
ANION GAP SERPL CALC-SCNC: 5 MMOL/L (ref 2–11)
AST SERPL-CCNC: 14 U/L (ref 15–37)
AST SERPL-CCNC: 15 U/L (ref 15–37)
BASOPHILS # BLD: 0 K/UL (ref 0–0.2)
BASOPHILS NFR BLD: 1 % (ref 0–2)
BILIRUB SERPL-MCNC: 0.4 MG/DL (ref 0.2–1.1)
BILIRUB SERPL-MCNC: 0.5 MG/DL (ref 0.2–1.1)
BUN SERPL-MCNC: 14 MG/DL (ref 8–23)
BUN SERPL-MCNC: 14 MG/DL (ref 8–23)
CALCIUM SERPL-MCNC: 10.2 MG/DL (ref 8.3–10.4)
CALCIUM SERPL-MCNC: 8.9 MG/DL (ref 8.3–10.4)
CHLORIDE SERPL-SCNC: 103 MMOL/L (ref 103–113)
CHLORIDE SERPL-SCNC: 103 MMOL/L (ref 103–113)
CHOLEST SERPL-MCNC: 240 MG/DL
CO2 SERPL-SCNC: 30 MMOL/L (ref 21–32)
CO2 SERPL-SCNC: 31 MMOL/L (ref 21–32)
CREAT SERPL-MCNC: 0.7 MG/DL (ref 0.6–1)
CREAT SERPL-MCNC: 0.7 MG/DL (ref 0.6–1)
D DIMER PPP FEU-MCNC: 0.39 UG/ML(FEU)
DIFFERENTIAL METHOD BLD: NORMAL
EOSINOPHIL # BLD: 0.1 K/UL (ref 0–0.8)
EOSINOPHIL NFR BLD: 2 % (ref 0.5–7.8)
ERYTHROCYTE [DISTWIDTH] IN BLOOD BY AUTOMATED COUNT: 13.6 % (ref 11.9–14.6)
GLOBULIN SER CALC-MCNC: 3 G/DL (ref 2.8–4.5)
GLOBULIN SER CALC-MCNC: 3.9 G/DL (ref 2.8–4.5)
GLUCOSE SERPL-MCNC: 95 MG/DL (ref 65–100)
GLUCOSE SERPL-MCNC: 99 MG/DL (ref 65–100)
HCT VFR BLD AUTO: 41.8 % (ref 35.8–46.3)
HDLC SERPL-MCNC: 89 MG/DL (ref 40–60)
HDLC SERPL: 2.7
HGB BLD-MCNC: 13.6 G/DL (ref 11.7–15.4)
IMM GRANULOCYTES # BLD AUTO: 0 K/UL (ref 0–0.5)
IMM GRANULOCYTES NFR BLD AUTO: 0 % (ref 0–5)
LDLC SERPL CALC-MCNC: 141.6 MG/DL
LYMPHOCYTES # BLD: 1.9 K/UL (ref 0.5–4.6)
LYMPHOCYTES NFR BLD: 33 % (ref 13–44)
MCH RBC QN AUTO: 31.3 PG (ref 26.1–32.9)
MCHC RBC AUTO-ENTMCNC: 32.5 G/DL (ref 31.4–35)
MCV RBC AUTO: 96.1 FL (ref 82–102)
MONOCYTES # BLD: 0.6 K/UL (ref 0.1–1.3)
MONOCYTES NFR BLD: 10 % (ref 4–12)
NEUTS SEG # BLD: 3.2 K/UL (ref 1.7–8.2)
NEUTS SEG NFR BLD: 55 % (ref 43–78)
NRBC # BLD: 0 K/UL (ref 0–0.2)
PLATELET # BLD AUTO: 266 K/UL (ref 150–450)
PMV BLD AUTO: 10.2 FL (ref 9.4–12.3)
POTASSIUM SERPL-SCNC: 3.6 MMOL/L (ref 3.5–5.1)
POTASSIUM SERPL-SCNC: 4.3 MMOL/L (ref 3.5–5.1)
PROT SERPL-MCNC: 6.8 G/DL (ref 6.3–8.2)
PROT SERPL-MCNC: 7.7 G/DL (ref 6.3–8.2)
RBC # BLD AUTO: 4.35 M/UL (ref 4.05–5.2)
SODIUM SERPL-SCNC: 136 MMOL/L (ref 136–146)
SODIUM SERPL-SCNC: 138 MMOL/L (ref 136–146)
T4 FREE SERPL-MCNC: 0.9 NG/DL (ref 0.78–1.46)
TRIGL SERPL-MCNC: 47 MG/DL (ref 35–150)
TSH, 3RD GENERATION: 1.59 UIU/ML (ref 0.36–3.74)
VLDLC SERPL CALC-MCNC: 9.4 MG/DL (ref 6–23)
WBC # BLD AUTO: 5.9 K/UL (ref 4.3–11.1)

## 2024-01-18 PROCEDURE — 82784 ASSAY IGA/IGD/IGG/IGM EACH: CPT

## 2024-01-18 PROCEDURE — 3077F SYST BP >= 140 MM HG: CPT | Performed by: INTERNAL MEDICINE

## 2024-01-18 PROCEDURE — 84165 PROTEIN E-PHORESIS SERUM: CPT

## 2024-01-18 PROCEDURE — 36415 COLL VENOUS BLD VENIPUNCTURE: CPT

## 2024-01-18 PROCEDURE — 1090F PRES/ABSN URINE INCON ASSESS: CPT | Performed by: INTERNAL MEDICINE

## 2024-01-18 PROCEDURE — 80053 COMPREHEN METABOLIC PANEL: CPT

## 2024-01-18 PROCEDURE — 83521 IG LIGHT CHAINS FREE EACH: CPT

## 2024-01-18 PROCEDURE — 86334 IMMUNOFIX E-PHORESIS SERUM: CPT

## 2024-01-18 PROCEDURE — 85379 FIBRIN DEGRADATION QUANT: CPT

## 2024-01-18 PROCEDURE — 3017F COLORECTAL CA SCREEN DOC REV: CPT | Performed by: INTERNAL MEDICINE

## 2024-01-18 PROCEDURE — G8427 DOCREV CUR MEDS BY ELIG CLIN: HCPCS | Performed by: INTERNAL MEDICINE

## 2024-01-18 PROCEDURE — 99215 OFFICE O/P EST HI 40 MIN: CPT | Performed by: INTERNAL MEDICINE

## 2024-01-18 PROCEDURE — G8420 CALC BMI NORM PARAMETERS: HCPCS | Performed by: INTERNAL MEDICINE

## 2024-01-18 PROCEDURE — 85025 COMPLETE CBC W/AUTO DIFF WBC: CPT

## 2024-01-18 PROCEDURE — G8399 PT W/DXA RESULTS DOCUMENT: HCPCS | Performed by: INTERNAL MEDICINE

## 2024-01-18 PROCEDURE — G8484 FLU IMMUNIZE NO ADMIN: HCPCS | Performed by: INTERNAL MEDICINE

## 2024-01-18 PROCEDURE — 1123F ACP DISCUSS/DSCN MKR DOCD: CPT | Performed by: INTERNAL MEDICINE

## 2024-01-18 PROCEDURE — 1036F TOBACCO NON-USER: CPT | Performed by: INTERNAL MEDICINE

## 2024-01-18 PROCEDURE — 3078F DIAST BP <80 MM HG: CPT | Performed by: INTERNAL MEDICINE

## 2024-01-18 ASSESSMENT — PATIENT HEALTH QUESTIONNAIRE - PHQ9
SUM OF ALL RESPONSES TO PHQ9 QUESTIONS 1 & 2: 0
2. FEELING DOWN, DEPRESSED OR HOPELESS: 0
SUM OF ALL RESPONSES TO PHQ QUESTIONS 1-9: 0
1. LITTLE INTEREST OR PLEASURE IN DOING THINGS: 0
SUM OF ALL RESPONSES TO PHQ QUESTIONS 1-9: 0

## 2024-01-18 NOTE — PROGRESS NOTES
[place] [address]  Phone: [phone]           1/18/2024  Candelaria Marshall  1955  [id number]        Cadnelaria Marshall is a 68 year old  female who has returned to my clinic for a follow-up visit; she was initially referred to me by Dr. Alisia Sawyer. In 9/2023 she was found to have a right leg DVT, her partial Hypercoagulable work-up revealed an elevated Factor VIII level and compound heterozygosity for the MTHFR mutation without Hyperhomocysteinemia, she is presently being anti-coagulated with Eliquis. In 10/2023 she was diagnosed with MGUS, IgG Lambda ( low-risk group ).        ALLERGIES:    No known drug allergies.        FAMILY HISTORY:    Her daughter had a Pulmonary Embolism.        SOCIAL HISTORY:     She is  and lives with her . She used to work as a . She stopped smoking cigarettes in 1/1991.        PAST MEDICAL HISTORY:    Hypertension, Anxiety Disorder, Iron Deficiency Anemia, GERD, Allergic Rhinitis, Osteoarthritis, Depression, Migraines, Hyperlipidemia, MGUS, Sjogren's Syndrome, Raynaud's phenomenon and an underlying Hypercoagulable Disorder.        ROS:  The patient complained of fatigue and dry mouth; all other systems negative.        PHYSICAL EXAM:   The patient was alert, awake and oriented, no acute distress was noted. Oral examination did not reveal any mucosal lesions. Lymph node examination did not reveal any adenopathy. Neck examination revealed a supple neck, no thyromegaly or masses were noted. Chest examination revealed normal vesicular breath sounds. Heart examination revealed S-1 and S-2 with a 2/6 systolic murmur. Abdominal examination revealed a non-tender abdomen, bowel sounds were positive, no organomegaly could be appreciated. Examination of the extremities did not reveal any tenderness or erythema. Examination of the skin did not reveal any lesions.        KPS:  90.        LABORATORY

## 2024-01-18 NOTE — PATIENT INSTRUCTIONS
Patient Instructions from Today's Visit    Reason for Visit:  Follow Up    Diagnosis Information:  https://www.cancer.net/about-us/asco-answers-patient-education-materials/sfmj-swjxosn-wlrg-sheets      Plan:  Lab work looks stable today  Continue taking Eliquis and Folbic as directed     Follow Up:  We will bring you back in April for a follow up with  and lab work prior.    Recent Lab Results:  Hospital Outpatient Visit on 01/18/2024   Component Date Value Ref Range Status    WBC 01/18/2024 5.9  4.3 - 11.1 K/uL Final    RBC 01/18/2024 4.35  4.05 - 5.2 M/uL Final    Hemoglobin 01/18/2024 13.6  11.7 - 15.4 g/dL Final    Hematocrit 01/18/2024 41.8  35.8 - 46.3 % Final    MCV 01/18/2024 96.1  82.0 - 102.0 FL Final    MCH 01/18/2024 31.3  26.1 - 32.9 PG Final    MCHC 01/18/2024 32.5  31.4 - 35.0 g/dL Final    RDW 01/18/2024 13.6  11.9 - 14.6 % Final    Platelets 01/18/2024 266  150 - 450 K/uL Final    MPV 01/18/2024 10.2  9.4 - 12.3 FL Final    nRBC 01/18/2024 0.00  0.0 - 0.2 K/uL Final    **Note: Absolute NRBC parameter is now reported with Hemogram**    Differential Type 01/18/2024 AUTOMATED    Final    Neutrophils % 01/18/2024 55  43 - 78 % Final    Lymphocytes % 01/18/2024 33  13 - 44 % Final    Monocytes % 01/18/2024 10  4.0 - 12.0 % Final    Eosinophils % 01/18/2024 2  0.5 - 7.8 % Final    Basophils % 01/18/2024 1  0.0 - 2.0 % Final    Immature Granulocytes 01/18/2024 0  0.0 - 5.0 % Final    Neutrophils Absolute 01/18/2024 3.2  1.7 - 8.2 K/UL Final    Lymphocytes Absolute 01/18/2024 1.9  0.5 - 4.6 K/UL Final    Monocytes Absolute 01/18/2024 0.6  0.1 - 1.3 K/UL Final    Eosinophils Absolute 01/18/2024 0.1  0.0 - 0.8 K/UL Final    Basophils Absolute 01/18/2024 0.0  0.0 - 0.2 K/UL Final    Absolute Immature Granulocyte 01/18/2024 0.0  0.0 - 0.5 K/UL Final    Sodium 01/18/2024 136  136 - 146 mmol/L Final    REPORT DELAYED,INSTRUMENT MALFUNCTION    Potassium 01/18/2024 3.6  3.5 - 5.1 mmol/L Final    REPORT

## 2024-01-22 LAB
KAPPA LC FREE SER-MCNC: 10.6 MG/L (ref 3.3–19.4)
KAPPA LC FREE/LAMBDA FREE SER: 0.75 (ref 0.26–1.65)
LAMBDA LC FREE SERPL-MCNC: 14.1 MG/L (ref 5.7–26.3)

## 2024-01-22 SDOH — HEALTH STABILITY: PHYSICAL HEALTH: ON AVERAGE, HOW MANY MINUTES DO YOU ENGAGE IN EXERCISE AT THIS LEVEL?: 20 MIN

## 2024-01-22 SDOH — HEALTH STABILITY: PHYSICAL HEALTH: ON AVERAGE, HOW MANY DAYS PER WEEK DO YOU ENGAGE IN MODERATE TO STRENUOUS EXERCISE (LIKE A BRISK WALK)?: 7 DAYS

## 2024-01-22 ASSESSMENT — LIFESTYLE VARIABLES
HOW OFTEN DURING THE LAST YEAR HAVE YOU FAILED TO DO WHAT WAS NORMALLY EXPECTED FROM YOU BECAUSE OF DRINKING: 0
HAVE YOU OR SOMEONE ELSE BEEN INJURED AS A RESULT OF YOUR DRINKING: NO
HOW OFTEN DURING THE LAST YEAR HAVE YOU FOUND THAT YOU WERE NOT ABLE TO STOP DRINKING ONCE YOU HAD STARTED: 0
HOW OFTEN DURING THE LAST YEAR HAVE YOU FOUND THAT YOU WERE NOT ABLE TO STOP DRINKING ONCE YOU HAD STARTED: NEVER
HAS A RELATIVE, FRIEND, DOCTOR, OR ANOTHER HEALTH PROFESSIONAL EXPRESSED CONCERN ABOUT YOUR DRINKING OR SUGGESTED YOU CUT DOWN: 0
HOW OFTEN DURING THE LAST YEAR HAVE YOU HAD A FEELING OF GUILT OR REMORSE AFTER DRINKING: 1
HOW MANY STANDARD DRINKS CONTAINING ALCOHOL DO YOU HAVE ON A TYPICAL DAY: 1 OR 2
HOW MANY STANDARD DRINKS CONTAINING ALCOHOL DO YOU HAVE ON A TYPICAL DAY: 1
HOW OFTEN DO YOU HAVE SIX OR MORE DRINKS ON ONE OCCASION: 1
HOW OFTEN DURING THE LAST YEAR HAVE YOU BEEN UNABLE TO REMEMBER WHAT HAPPENED THE NIGHT BEFORE BECAUSE YOU HAD BEEN DRINKING: 0
HOW OFTEN DURING THE LAST YEAR HAVE YOU NEEDED AN ALCOHOLIC DRINK FIRST THING IN THE MORNING TO GET YOURSELF GOING AFTER A NIGHT OF HEAVY DRINKING: NEVER
HOW OFTEN DURING THE LAST YEAR HAVE YOU NEEDED AN ALCOHOLIC DRINK FIRST THING IN THE MORNING TO GET YOURSELF GOING AFTER A NIGHT OF HEAVY DRINKING: 0
HOW OFTEN DO YOU HAVE A DRINK CONTAINING ALCOHOL: 5
HOW OFTEN DURING THE LAST YEAR HAVE YOU HAD A FEELING OF GUILT OR REMORSE AFTER DRINKING: LESS THAN MONTHLY
HOW OFTEN DO YOU HAVE A DRINK CONTAINING ALCOHOL: 4 OR MORE TIMES A WEEK
HOW OFTEN DURING THE LAST YEAR HAVE YOU FAILED TO DO WHAT WAS NORMALLY EXPECTED FROM YOU BECAUSE OF DRINKING: NEVER
HAS A RELATIVE, FRIEND, DOCTOR, OR ANOTHER HEALTH PROFESSIONAL EXPRESSED CONCERN ABOUT YOUR DRINKING OR SUGGESTED YOU CUT DOWN: NO
HOW OFTEN DURING THE LAST YEAR HAVE YOU BEEN UNABLE TO REMEMBER WHAT HAPPENED THE NIGHT BEFORE BECAUSE YOU HAD BEEN DRINKING: NEVER
HAVE YOU OR SOMEONE ELSE BEEN INJURED AS A RESULT OF YOUR DRINKING: 0

## 2024-01-22 ASSESSMENT — PATIENT HEALTH QUESTIONNAIRE - PHQ9
SUM OF ALL RESPONSES TO PHQ QUESTIONS 1-9: 0
2. FEELING DOWN, DEPRESSED OR HOPELESS: 0
SUM OF ALL RESPONSES TO PHQ9 QUESTIONS 1 & 2: 0
1. LITTLE INTEREST OR PLEASURE IN DOING THINGS: 0

## 2024-01-23 LAB
ALBUMIN SERPL ELPH-MCNC: 3.9 G/DL (ref 2.9–4.4)
ALBUMIN/GLOB SERPL: 1.3 (ref 0.7–1.7)
ALPHA1 GLOB SERPL ELPH-MCNC: 0.2 G/DL (ref 0–0.4)
ALPHA2 GLOB SERPL ELPH-MCNC: 0.7 G/DL (ref 0.4–1)
B-GLOBULIN SERPL ELPH-MCNC: 1 G/DL (ref 0.7–1.3)
GAMMA GLOB SERPL ELPH-MCNC: 1.2 G/DL (ref 0.4–1.8)
GLOBULIN SER-MCNC: 3.2 G/DL (ref 2.2–3.9)
IGA SERPL-MCNC: 303 MG/DL (ref 87–352)
IGG SERPL-MCNC: 1029 MG/DL (ref 586–1602)
IGM SERPL-MCNC: 110 MG/DL (ref 26–217)
INTERPRETATION SERPL IEP-IMP: ABNORMAL
M PROTEIN SERPL ELPH-MCNC: 0.2 G/DL
PROT SERPL-MCNC: 7.1 G/DL (ref 6–8.5)

## 2024-01-24 ENCOUNTER — OFFICE VISIT (OUTPATIENT)
Dept: INTERNAL MEDICINE CLINIC | Facility: CLINIC | Age: 69
End: 2024-01-24
Payer: MEDICARE

## 2024-01-24 VITALS
SYSTOLIC BLOOD PRESSURE: 90 MMHG | RESPIRATION RATE: 16 BRPM | BODY MASS INDEX: 24.53 KG/M2 | HEART RATE: 46 BPM | HEIGHT: 65 IN | DIASTOLIC BLOOD PRESSURE: 50 MMHG | OXYGEN SATURATION: 100 % | TEMPERATURE: 97.9 F | WEIGHT: 147.2 LBS

## 2024-01-24 DIAGNOSIS — E78.2 MIXED HYPERLIPIDEMIA: ICD-10-CM

## 2024-01-24 DIAGNOSIS — I10 ESSENTIAL HYPERTENSION: ICD-10-CM

## 2024-01-24 DIAGNOSIS — F41.9 ANXIETY AND DEPRESSION: ICD-10-CM

## 2024-01-24 DIAGNOSIS — Z00.00 MEDICARE ANNUAL WELLNESS VISIT, SUBSEQUENT: Primary | ICD-10-CM

## 2024-01-24 DIAGNOSIS — R76.8 POSITIVE ANA (ANTINUCLEAR ANTIBODY): ICD-10-CM

## 2024-01-24 DIAGNOSIS — Z12.31 VISIT FOR SCREENING MAMMOGRAM: ICD-10-CM

## 2024-01-24 DIAGNOSIS — Z15.89 HETEROZYGOUS FOR MTHFR GENE MUTATION: ICD-10-CM

## 2024-01-24 DIAGNOSIS — K21.9 GASTROESOPHAGEAL REFLUX DISEASE, UNSPECIFIED WHETHER ESOPHAGITIS PRESENT: ICD-10-CM

## 2024-01-24 DIAGNOSIS — Z86.718 HISTORY OF DVT (DEEP VEIN THROMBOSIS): ICD-10-CM

## 2024-01-24 DIAGNOSIS — E55.9 VITAMIN D INSUFFICIENCY: ICD-10-CM

## 2024-01-24 DIAGNOSIS — F32.A ANXIETY AND DEPRESSION: ICD-10-CM

## 2024-01-24 DIAGNOSIS — D47.2 MGUS (MONOCLONAL GAMMOPATHY OF UNKNOWN SIGNIFICANCE): ICD-10-CM

## 2024-01-24 DIAGNOSIS — M85.89 OSTEOPENIA OF MULTIPLE SITES: ICD-10-CM

## 2024-01-24 PROBLEM — M35.00 SJOGREN'S SYNDROME (HCC): Status: RESOLVED | Noted: 2019-01-01 | Resolved: 2024-01-24

## 2024-01-24 PROCEDURE — G8427 DOCREV CUR MEDS BY ELIG CLIN: HCPCS | Performed by: INTERNAL MEDICINE

## 2024-01-24 PROCEDURE — 1090F PRES/ABSN URINE INCON ASSESS: CPT | Performed by: INTERNAL MEDICINE

## 2024-01-24 PROCEDURE — 3017F COLORECTAL CA SCREEN DOC REV: CPT | Performed by: INTERNAL MEDICINE

## 2024-01-24 PROCEDURE — 3074F SYST BP LT 130 MM HG: CPT | Performed by: INTERNAL MEDICINE

## 2024-01-24 PROCEDURE — G0439 PPPS, SUBSEQ VISIT: HCPCS | Performed by: INTERNAL MEDICINE

## 2024-01-24 PROCEDURE — 3078F DIAST BP <80 MM HG: CPT | Performed by: INTERNAL MEDICINE

## 2024-01-24 PROCEDURE — G8420 CALC BMI NORM PARAMETERS: HCPCS | Performed by: INTERNAL MEDICINE

## 2024-01-24 PROCEDURE — G8484 FLU IMMUNIZE NO ADMIN: HCPCS | Performed by: INTERNAL MEDICINE

## 2024-01-24 PROCEDURE — G8399 PT W/DXA RESULTS DOCUMENT: HCPCS | Performed by: INTERNAL MEDICINE

## 2024-01-24 PROCEDURE — 99214 OFFICE O/P EST MOD 30 MIN: CPT | Performed by: INTERNAL MEDICINE

## 2024-01-24 PROCEDURE — 1036F TOBACCO NON-USER: CPT | Performed by: INTERNAL MEDICINE

## 2024-01-24 PROCEDURE — 1123F ACP DISCUSS/DSCN MKR DOCD: CPT | Performed by: INTERNAL MEDICINE

## 2024-01-24 RX ORDER — OMEPRAZOLE 20 MG/1
20 CAPSULE, DELAYED RELEASE ORAL DAILY
Qty: 90 CAPSULE | Refills: 3 | Status: SHIPPED | OUTPATIENT
Start: 2024-01-24

## 2024-01-24 ASSESSMENT — ENCOUNTER SYMPTOMS
NAUSEA: 0
COUGH: 0
ABDOMINAL PAIN: 0
CHEST TIGHTNESS: 0
BLOOD IN STOOL: 0
RHINORRHEA: 0
DIARRHEA: 0
VOMITING: 0
SHORTNESS OF BREATH: 0
SINUS PRESSURE: 0

## 2024-01-24 NOTE — PROGRESS NOTES
regular exercise.    -     Comprehensive Metabolic Panel; Future  -     Lipid Panel; Future  -     T4, Free; Future  -     TSH; Future    History of DVT (deep vein thrombosis)  Stable on present medications, continue as prescribed.    Anxiety and depression  Stable on present medications, continue as prescribed.    Heterozygous for MTHFR gene mutation  She has regular f/u w/ Hematology;     MGUS (monoclonal gammopathy of unknown significance)    Positive MELISSA (antinuclear antibody)  She has regular f/u w/ Rheumatology;     Visit for screening mammogram  -     [facility] DIGITAL SCREEN W OR WO CAD BILATERAL; Future    Gastroesophageal reflux disease, unspecified whether esophagitis present  .lcbst  -     omeprazole (PRILOSEC) 20 MG delayed release capsule; Take 1 capsule by mouth daily  -     CBC with Auto Differential; Future    Vitamin D insufficiency  -     Vitamin D 25 Hydroxy; Future          Follow-up and Dispositions    Return in about 1 year (around 1/24/2025), or if symptoms worsen or fail to improve, for medicare annual w/ labs.         Medication Reconciliation:  Current Medications Verified: Current medications/ immunizations were reviewed, including purpose, with patient.  Family History, Social History, Current and Past Medical History was reviewed with patient and updated at today's office visit.  Medication Reconciliation list was given to patient/ family.  Patient was advised to discard old medication lists and provide all providers with current list at each visit and carry list with them in case of emergency.    Completed By:   Alisia Sawyer MD    [facility]  [address] [place] [address], [address]  [place] [address]  [phone] fax  11:59 AMMedicare Annual Wellness Visit    Candelaria Marshall is here for Medicare AWV (Pt is here for Medicare Wellness and to review labs. )    Assessment & Plan   Medicare annual wellness visit, subsequent  Essential hypertension  -

## 2024-01-24 NOTE — PATIENT INSTRUCTIONS
screenings are paid in full while other may be subject to a deductible, co-insurance, and/or copay.    Some of these benefits include a comprehensive review of your medical history including lifestyle, illnesses that may run in your family, and various assessments and screenings as appropriate.    After reviewing your medical record and screening and assessments performed today your provider may have ordered immunizations, labs, imaging, and/or referrals for you.  A list of these orders (if applicable) as well as your Preventive Care list are included within your After Visit Summary for your review.    Other Preventive Recommendations:    A preventive eye exam performed by an eye specialist is recommended every 1-2 years to screen for glaucoma; cataracts, macular degeneration, and other eye disorders.  A preventive dental visit is recommended every 6 months.  Try to get at least 150 minutes of exercise per week or 10,000 steps per day on a pedometer .  Order or download the FREE \"Exercise & Physical Activity: Your Everyday Guide\" from [employer] [place] [employer]. Call 1-960.626.3646 or search [employer] [place] [employer] online.  You need 9856-5805 mg of calcium and 6181-0386 IU of vitamin D per day. It is possible to meet your calcium requirement with diet alone, but a vitamin D supplement is usually necessary to meet this goal.  When exposed to the sun, use a sunscreen that protects against both UVA and UVB radiation with an SPF of 30 or greater. Reapply every 2 to 3 hours or after sweating, drying off with a towel, or swimming.  Always wear a seat belt when traveling in a car. Always wear a helmet when riding a bicycle or motorcycle.

## 2024-01-31 ENCOUNTER — OFFICE VISIT (OUTPATIENT)
Dept: ENT CLINIC | Age: 69
End: 2024-01-31
Payer: MEDICARE

## 2024-01-31 ENCOUNTER — OFFICE VISIT (OUTPATIENT)
Age: 69
End: 2024-01-31
Payer: MEDICARE

## 2024-01-31 VITALS — HEIGHT: 65 IN | BODY MASS INDEX: 24.49 KG/M2 | WEIGHT: 147 LBS

## 2024-01-31 DIAGNOSIS — J30.9 ALLERGIC RHINITIS, UNSPECIFIED SEASONALITY, UNSPECIFIED TRIGGER: Chronic | ICD-10-CM

## 2024-01-31 DIAGNOSIS — H69.93 DYSFUNCTION OF BOTH EUSTACHIAN TUBES: Chronic | ICD-10-CM

## 2024-01-31 DIAGNOSIS — R09.82 PND (POST-NASAL DRIP): Primary | ICD-10-CM

## 2024-01-31 DIAGNOSIS — H90.3 SENSORINEURAL HEARING LOSS (SNHL) OF BOTH EARS: Chronic | ICD-10-CM

## 2024-01-31 DIAGNOSIS — H90.3 SENSORINEURAL HEARING LOSS, BILATERAL: Primary | ICD-10-CM

## 2024-01-31 PROCEDURE — 3017F COLORECTAL CA SCREEN DOC REV: CPT | Performed by: PHYSICIAN ASSISTANT

## 2024-01-31 PROCEDURE — G8399 PT W/DXA RESULTS DOCUMENT: HCPCS | Performed by: PHYSICIAN ASSISTANT

## 2024-01-31 PROCEDURE — 1090F PRES/ABSN URINE INCON ASSESS: CPT | Performed by: PHYSICIAN ASSISTANT

## 2024-01-31 PROCEDURE — 92557 COMPREHENSIVE HEARING TEST: CPT | Performed by: AUDIOLOGIST

## 2024-01-31 PROCEDURE — 92567 TYMPANOMETRY: CPT | Performed by: AUDIOLOGIST

## 2024-01-31 PROCEDURE — G8420 CALC BMI NORM PARAMETERS: HCPCS | Performed by: PHYSICIAN ASSISTANT

## 2024-01-31 PROCEDURE — 99213 OFFICE O/P EST LOW 20 MIN: CPT | Performed by: PHYSICIAN ASSISTANT

## 2024-01-31 PROCEDURE — 1123F ACP DISCUSS/DSCN MKR DOCD: CPT | Performed by: PHYSICIAN ASSISTANT

## 2024-01-31 PROCEDURE — 1036F TOBACCO NON-USER: CPT | Performed by: PHYSICIAN ASSISTANT

## 2024-01-31 PROCEDURE — G8427 DOCREV CUR MEDS BY ELIG CLIN: HCPCS | Performed by: PHYSICIAN ASSISTANT

## 2024-01-31 PROCEDURE — G8484 FLU IMMUNIZE NO ADMIN: HCPCS | Performed by: PHYSICIAN ASSISTANT

## 2024-01-31 RX ORDER — CHLORHEXIDINE GLUCONATE ORAL RINSE 1.2 MG/ML
SOLUTION DENTAL
COMMUNITY
Start: 2024-01-30

## 2024-01-31 RX ORDER — SODIUM FLUORIDE 1.1 G/100G
CREAM ORAL
COMMUNITY
Start: 2024-01-30

## 2024-01-31 ASSESSMENT — ENCOUNTER SYMPTOMS
ALLERGIC/IMMUNOLOGIC NEGATIVE: 1
GASTROINTESTINAL NEGATIVE: 1
EYES NEGATIVE: 1
RESPIRATORY NEGATIVE: 1

## 2024-01-31 NOTE — PROGRESS NOTES
Candelaria Marshall is a 68 y.o. female presents today for recheck hearing. Has had a series of colds, congestion in the ears and flew home from [place] and couldn't clear ears and hearing loss after that. Valsalva did help eventually. But not initially. Congested in the ear feeling remains. Vertigo used to happen quite a bit, especially with rolling over in bed, that hasn't happened recently. One episode prior to [employer] flight. Standing and feels like she could fall over backwards. Wears hearing aids.     Audiogram:     Left ear: Normal hearing to mod severe snhl  Right ear: Normal hearing to mod severe snhl  Discrimination score: left ear 96 % and right ear 92 %  Tympanogram: left ear Type A  and right ear Type A .       Chief Complaint   Patient presents with    Follow-up     Patient presents for a 6 month follow up for HL and audio.  Patient states she had a bunch of colds in between now and last visit.  Patient flew over [place] and had troubles with ears popping.  Same thing happen at beginning of the month.  Patients still feels like her ears are clogged       Patient Active Problem List   Diagnosis    Urinary incontinence    Osteoarthritis    Menopause    Essential hypertension    Insomnia    Vertigo    Migraine without aura or status migrainosus    Seasonal allergic rhinitis due to pollen    Osteopenia of multiple sites    Anxiety and depression    GERD (gastroesophageal reflux disease)    Mixed hyperlipidemia    Raynaud's phenomenon    History of DVT (deep vein thrombosis)    Heterozygous for MTHFR gene mutation    MGUS (monoclonal gammopathy of unknown significance)    Positive MELISSA (antinuclear antibody)        Reviewed and updated this visit by provider:  Tobacco  Allergies  Meds  Problems  Med Hx  Surg Hx  Fam Hx         Review of Systems   Constitutional: Negative.    HENT:  Positive for hearing loss.         Ear fullness   Eyes: Negative.    Respiratory: Negative.     Cardiovascular:

## 2024-01-31 NOTE — PROGRESS NOTES
AUDIOLOGY EVALUATION    Candelaria Marshall had Tympanometry and Audiometry performed today.    The patient reports hearing loss.     Results as follows:    Tympanometry    Type A -  bilaterally    Audiometry    Test Performed - Comprehensive Audiogram    Type of Loss - Right Ear: abnormal hearing: degree of loss is normal to moderately severe sensorineural hearing loss                           Left Ear: abnormal hearing: degree of loss is normal to moderately severe sensorineural hearing loss     SRT   Measurement Right Ear Left Ear   Value 25 30   Unit dB dB     Discrimination  Measurement Right Ear Left Ear   Value 92% 96%   Unit dB dB     Recommend  Binaural amplification and annual audios    Imelda Guzman [facility]  Audiologist

## 2024-02-14 DIAGNOSIS — K21.9 GASTROESOPHAGEAL REFLUX DISEASE, UNSPECIFIED WHETHER ESOPHAGITIS PRESENT: ICD-10-CM

## 2024-02-14 RX ORDER — OMEPRAZOLE 20 MG/1
CAPSULE, DELAYED RELEASE ORAL DAILY
Qty: 90 CAPSULE | Refills: 1 | OUTPATIENT
Start: 2024-02-14

## 2024-02-23 ENCOUNTER — OFFICE VISIT (OUTPATIENT)
Dept: RHEUMATOLOGY | Age: 69
End: 2024-02-23
Payer: MEDICARE

## 2024-02-23 VITALS
BODY MASS INDEX: 24.32 KG/M2 | WEIGHT: 146 LBS | SYSTOLIC BLOOD PRESSURE: 113 MMHG | HEART RATE: 45 BPM | DIASTOLIC BLOOD PRESSURE: 72 MMHG | HEIGHT: 65 IN

## 2024-02-23 DIAGNOSIS — R76.8 POSITIVE ANA (ANTINUCLEAR ANTIBODY): Primary | ICD-10-CM

## 2024-02-23 DIAGNOSIS — M19.042 PRIMARY OSTEOARTHRITIS OF BOTH HANDS: ICD-10-CM

## 2024-02-23 DIAGNOSIS — R76.8 POSITIVE ANA (ANTINUCLEAR ANTIBODY): ICD-10-CM

## 2024-02-23 DIAGNOSIS — M19.041 PRIMARY OSTEOARTHRITIS OF BOTH HANDS: ICD-10-CM

## 2024-02-23 PROCEDURE — 1123F ACP DISCUSS/DSCN MKR DOCD: CPT | Performed by: INTERNAL MEDICINE

## 2024-02-23 PROCEDURE — 1090F PRES/ABSN URINE INCON ASSESS: CPT | Performed by: INTERNAL MEDICINE

## 2024-02-23 PROCEDURE — G8420 CALC BMI NORM PARAMETERS: HCPCS | Performed by: INTERNAL MEDICINE

## 2024-02-23 PROCEDURE — G8399 PT W/DXA RESULTS DOCUMENT: HCPCS | Performed by: INTERNAL MEDICINE

## 2024-02-23 PROCEDURE — 1036F TOBACCO NON-USER: CPT | Performed by: INTERNAL MEDICINE

## 2024-02-23 PROCEDURE — G8484 FLU IMMUNIZE NO ADMIN: HCPCS | Performed by: INTERNAL MEDICINE

## 2024-02-23 PROCEDURE — 3078F DIAST BP <80 MM HG: CPT | Performed by: INTERNAL MEDICINE

## 2024-02-23 PROCEDURE — 99214 OFFICE O/P EST MOD 30 MIN: CPT | Performed by: INTERNAL MEDICINE

## 2024-02-23 PROCEDURE — 3017F COLORECTAL CA SCREEN DOC REV: CPT | Performed by: INTERNAL MEDICINE

## 2024-02-23 PROCEDURE — G8427 DOCREV CUR MEDS BY ELIG CLIN: HCPCS | Performed by: INTERNAL MEDICINE

## 2024-02-23 PROCEDURE — 3074F SYST BP LT 130 MM HG: CPT | Performed by: INTERNAL MEDICINE

## 2024-02-23 ASSESSMENT — JOINT PAIN
TOTAL NUMBER OF TENDER JOINTS: 6
TOTAL NUMBER OF SWOLLEN JOINTS: 4

## 2024-02-23 ASSESSMENT — ROUTINE ASSESSMENT OF PATIENT INDEX DATA (RAPID3)
ON A SCALE OF ONE TO TEN, HOW DIFFICULT WAS IT FOR YOU TO COMPLETE THE LISTED DAILY PHYSICAL TASKS OVER THE LAST WEEK: 0.2
ON A SCALE OF ONE TO TEN, HOW MUCH PAIN HAVE YOU HAD BECAUSE OF YOUR CONDITION OVER THE PAST WEEK?: 4
ON A SCALE OF ONE TO TEN, HOW MUCH OF A PROBLEM HAS UNUSUAL FATIGUE OR TIREDNESS BEEN FOR YOU OVER THE PAST WEEK?: 1
ON A SCALE OF ONE TO TEN, CONSIDERING ALL THE WAYS IN WHICH ILLNESS AND HEALTH CONDITIONS MAY AFFECT YOU AT THIS TIME, PLEASE INDICATE BELOW HOW YOU ARE DOING:: 5
WHEN YOU AWAKENED IN THE MORNING OVER THE LAST WEEK, PLEASE INDICATE THE AMOUNT OF TIME IT TAKES UNTIL YOU ARE AS LIMBER AS YOU WILL BE FOR THE DAY: < 10 MIN

## 2024-02-23 NOTE — PROGRESS NOTES
[facility]  Yina Ford M.D.  [address] , [address]   [facility]  Office : [phone], Fax: [phone]     RHEUMATOLOGY OFFICE VISIT NOTE  Date of Visit:  2024 9:38 AM    Patient Information:  Name:  Candelaria Marshall  :  1955  Age:  68 y.o.   Gender:  female      Ms. Marshall is here today for follow-up of OA and medication monitoring.     Last visit: 23    History of Present Illness: On talking to the patient today she states that she has seen an allergist and was switched from the Flonase to Astelin to help with her seasonal allergies.  On talking to her further she states that she uses an oral antihistamine as needed as well. She is going to PT three times a week for lower back and bilateral hip pain and at PT they have found out that she has pelvic floor weakness as well.  Her current joint complaints are as mentioned below.    Since the last visit, patient is feeling \"fair\".    Pain: 4/10  Location: Some lower back pain. Some neck stiffness with no pain with neck ROM with no tension headaches. Some right shoulder pain. Bilateral wrist pain with no swelling, warmth and redness. Some pain and swelling of the DIP joints of both the hands. Occasional ankle pain with no swelling with no buckling. Occasional knee pain with no swelling with occasional buckling of the right knee.   Quality:  Deep achy to sharp pain.   Modifying Factors:  Random times has pain in her hands.   Associated Symptoms:  No tingling, numbness or pain down the arms or legs with intermittent tingling and numbness of the big toes of both her feet. No limitations with her ADL's. Some weakness of her hands.         2024     9:00 AM   DMARD/Biologic   AM Stiffness < 10 min   Pain 4   Fatigue 1   MDHAQ 0.2   Patient Global Score 5     Last TB screen: <5 years  TB result:  Negative     Current dose of steroids: none  How long on current dose of steroids: NA  How

## 2024-02-24 DIAGNOSIS — G47.00 INSOMNIA, UNSPECIFIED TYPE: ICD-10-CM

## 2024-02-24 LAB
ENA SS-A AB SER-ACNC: 1.3 AI (ref 0–0.9)
ENA SS-B AB SER-ACNC: <0.2 AI (ref 0–0.9)

## 2024-02-27 RX ORDER — TRAZODONE HYDROCHLORIDE 50 MG/1
TABLET ORAL
Qty: 90 TABLET | Refills: 1 | Status: SHIPPED | OUTPATIENT
Start: 2024-02-27

## 2024-03-08 ENCOUNTER — TRANSCRIBE ORDERS (OUTPATIENT)
Dept: SCHEDULING | Age: 69
End: 2024-03-08

## 2024-03-08 DIAGNOSIS — Z12.31 SCREENING MAMMOGRAM FOR HIGH-RISK PATIENT: Primary | ICD-10-CM

## 2024-03-11 ENCOUNTER — TELEPHONE (OUTPATIENT)
Dept: INTERNAL MEDICINE CLINIC | Facility: CLINIC | Age: 69
End: 2024-03-11

## 2024-03-11 DIAGNOSIS — N63.20 MASS OF LEFT BREAST, UNSPECIFIED QUADRANT: Primary | ICD-10-CM

## 2024-03-11 NOTE — TELEPHONE ENCOUNTER
Central Radiology Scheduling needs to have orders for patient    A Diagnostic Mammogram     And a Left Breast Ultrasound ( patient found lump in left breast)

## 2024-03-13 ENCOUNTER — TELEPHONE (OUTPATIENT)
Dept: INTERNAL MEDICINE CLINIC | Facility: CLINIC | Age: 69
End: 2024-03-13

## 2024-03-13 NOTE — TELEPHONE ENCOUNTER
Radiology scheduling called and stated that patients diagnosis on her Diagnostic mammogram is triggering an ABN with the current diagnosis. She states she is not sure but Medicare usually wants a specific location as to where to mass is.

## 2024-03-18 ENCOUNTER — PATIENT MESSAGE (OUTPATIENT)
Dept: INTERNAL MEDICINE CLINIC | Facility: CLINIC | Age: 69
End: 2024-03-18

## 2024-03-18 NOTE — TELEPHONE ENCOUNTER
Ask patient where mass is; ideally, she would schedule visit so I can examine her; schedule first available FTF;

## 2024-03-18 NOTE — TELEPHONE ENCOUNTER
From: Candelaria Marshall  To: Dr. Alisia Sawyer  Sent: 3/18/2024 8:36 AM EDT  Subject: A story for Dr. Sawyer    Good Monday morning,    At my last visit, I told Dr. Sawyer that I had written a short story about a health scare. She said she would love to read it, so, I've attached it. Wilma, you are welcome to read it, too.    Best,  Candelaria

## 2024-03-26 ENCOUNTER — TELEPHONE (OUTPATIENT)
Dept: INTERNAL MEDICINE CLINIC | Facility: CLINIC | Age: 69
End: 2024-03-26

## 2024-03-26 NOTE — TELEPHONE ENCOUNTER
Duplicate encounter. Patient has to come in and be evaluated for further DX clarification on mammogram order. Appointment scheduled for 3/28.

## 2024-03-27 SDOH — ECONOMIC STABILITY: FOOD INSECURITY: WITHIN THE PAST 12 MONTHS, THE FOOD YOU BOUGHT JUST DIDN'T LAST AND YOU DIDN'T HAVE MONEY TO GET MORE.: NEVER TRUE

## 2024-03-27 SDOH — ECONOMIC STABILITY: TRANSPORTATION INSECURITY
IN THE PAST 12 MONTHS, HAS LACK OF TRANSPORTATION KEPT YOU FROM MEETINGS, WORK, OR FROM GETTING THINGS NEEDED FOR DAILY LIVING?: NO

## 2024-03-27 SDOH — ECONOMIC STABILITY: INCOME INSECURITY: HOW HARD IS IT FOR YOU TO PAY FOR THE VERY BASICS LIKE FOOD, HOUSING, MEDICAL CARE, AND HEATING?: NOT VERY HARD

## 2024-03-27 SDOH — ECONOMIC STABILITY: FOOD INSECURITY: WITHIN THE PAST 12 MONTHS, YOU WORRIED THAT YOUR FOOD WOULD RUN OUT BEFORE YOU GOT MONEY TO BUY MORE.: NEVER TRUE

## 2024-03-28 ENCOUNTER — OFFICE VISIT (OUTPATIENT)
Dept: INTERNAL MEDICINE CLINIC | Facility: CLINIC | Age: 69
End: 2024-03-28
Payer: MEDICARE

## 2024-03-28 VITALS
BODY MASS INDEX: 24.49 KG/M2 | SYSTOLIC BLOOD PRESSURE: 118 MMHG | HEART RATE: 56 BPM | TEMPERATURE: 97.1 F | OXYGEN SATURATION: 98 % | WEIGHT: 147 LBS | HEIGHT: 65 IN | DIASTOLIC BLOOD PRESSURE: 80 MMHG

## 2024-03-28 DIAGNOSIS — R06.09 DYSPNEA ON EXERTION: ICD-10-CM

## 2024-03-28 DIAGNOSIS — N63.25 BREAST LUMP ON LEFT SIDE AT 9 O'CLOCK POSITION: Primary | ICD-10-CM

## 2024-03-28 PROCEDURE — G8427 DOCREV CUR MEDS BY ELIG CLIN: HCPCS | Performed by: NURSE PRACTITIONER

## 2024-03-28 PROCEDURE — G8484 FLU IMMUNIZE NO ADMIN: HCPCS | Performed by: NURSE PRACTITIONER

## 2024-03-28 PROCEDURE — 93000 ELECTROCARDIOGRAM COMPLETE: CPT | Performed by: NURSE PRACTITIONER

## 2024-03-28 PROCEDURE — 3017F COLORECTAL CA SCREEN DOC REV: CPT | Performed by: NURSE PRACTITIONER

## 2024-03-28 PROCEDURE — 1123F ACP DISCUSS/DSCN MKR DOCD: CPT | Performed by: NURSE PRACTITIONER

## 2024-03-28 PROCEDURE — G8420 CALC BMI NORM PARAMETERS: HCPCS | Performed by: NURSE PRACTITIONER

## 2024-03-28 PROCEDURE — 99215 OFFICE O/P EST HI 40 MIN: CPT | Performed by: NURSE PRACTITIONER

## 2024-03-28 PROCEDURE — 1090F PRES/ABSN URINE INCON ASSESS: CPT | Performed by: NURSE PRACTITIONER

## 2024-03-28 PROCEDURE — 3074F SYST BP LT 130 MM HG: CPT | Performed by: NURSE PRACTITIONER

## 2024-03-28 PROCEDURE — 3079F DIAST BP 80-89 MM HG: CPT | Performed by: NURSE PRACTITIONER

## 2024-03-28 PROCEDURE — G8399 PT W/DXA RESULTS DOCUMENT: HCPCS | Performed by: NURSE PRACTITIONER

## 2024-03-28 PROCEDURE — 1036F TOBACCO NON-USER: CPT | Performed by: NURSE PRACTITIONER

## 2024-03-28 RX ORDER — ALBUTEROL SULFATE 90 UG/1
2 AEROSOL, METERED RESPIRATORY (INHALATION) 4 TIMES DAILY PRN
Qty: 18 G | Refills: 0 | Status: SHIPPED | OUTPATIENT
Start: 2024-03-28

## 2024-03-28 ASSESSMENT — ENCOUNTER SYMPTOMS
ABDOMINAL PAIN: 0
SHORTNESS OF BREATH: 1
COUGH: 0
NAUSEA: 0
VOMITING: 0
WHEEZING: 0
DIARRHEA: 0

## 2024-03-28 NOTE — PROGRESS NOTES
[facility]      3/28/2024    Patient Name: Candelaria Marshall  :  1955      Chief Complaint:  Chief Complaint   Patient presents with    Breast Mass     Patient c/o mass in left breast. Patient states she noticed the lump about 1 month ago.          HPI  Patient presents today with complaint of left breast mass. She noticed the mass initially about one month ago. It is unchanged since that time. Slightly tender to palpation. Last mammogram was normal on 23.     Patient also with complaint of shortness of breath on exertion. She notices the SOB only when climbing the two flights of stairs at the gym where is she currently participating in physical therapy. She admits that it could be due to being out of shape as she was exercising on a regular basis until about 2023 when she started having trouble with hip pain. She continues walking her dogs about 1 mile daily and denies any SOB with that. She denies any associated CP, dizziness, lightheadedness or edema. History of smoking. Quit early .         Past Medical History:   Diagnosis Date    Acute DVT (deep venous thrombosis) (HCC)     infrapopliteal peroneal vein.    Allergic rhinitis     Anxiety and depression     GERD (gastroesophageal reflux disease)     Hearing loss     I have hearing aids now.    History of bone density study 2023    osteopenia    History of mammogram 2023    HTN (hypertension)     Hyperlipidemia     Menopause     Migraine     Osteoarthritis     Osteopenia     Seasonal allergies     Superficial thrombosis of lower extremity, right     lesser saphenous vein       Past Surgical History:   Procedure Laterality Date    BREAST BIOPSY Right     BUNIONECTOMY Left     COLONOSCOPY      due for repeat     DERMOID CYST  EXCISION  1984    from ovary    HYSTERECTOMY (CERVIX STATUS UNKNOWN)      HYSTERECTOMY, TOTAL ABDOMINAL (CERVIX REMOVED)      ORTHOPEDIC SURGERY Bilateral 2016

## 2024-04-01 DIAGNOSIS — D47.2 MGUS (MONOCLONAL GAMMOPATHY OF UNKNOWN SIGNIFICANCE): Primary | ICD-10-CM

## 2024-04-01 DIAGNOSIS — D68.59 PRIMARY HYPERCOAGULABLE STATE (HCC): ICD-10-CM

## 2024-04-01 DIAGNOSIS — I82.491 DEEP VEIN THROMBOSIS (DVT) OF OTHER VEIN OF RIGHT LOWER EXTREMITY, UNSPECIFIED CHRONICITY (HCC): ICD-10-CM

## 2024-04-04 ENCOUNTER — OFFICE VISIT (OUTPATIENT)
Dept: ONCOLOGY | Age: 69
End: 2024-04-04
Payer: MEDICARE

## 2024-04-04 ENCOUNTER — HOSPITAL ENCOUNTER (OUTPATIENT)
Dept: LAB | Age: 69
Discharge: HOME | End: 2024-04-04
Payer: MEDICARE

## 2024-04-04 VITALS
WEIGHT: 143.7 LBS | BODY MASS INDEX: 23.94 KG/M2 | RESPIRATION RATE: 16 BRPM | OXYGEN SATURATION: 98 % | HEIGHT: 65 IN | DIASTOLIC BLOOD PRESSURE: 61 MMHG | TEMPERATURE: 98.5 F | SYSTOLIC BLOOD PRESSURE: 119 MMHG | HEART RATE: 55 BPM

## 2024-04-04 DIAGNOSIS — I10 HYPERTENSION, UNSPECIFIED TYPE: ICD-10-CM

## 2024-04-04 DIAGNOSIS — D47.2 MGUS (MONOCLONAL GAMMOPATHY OF UNKNOWN SIGNIFICANCE): ICD-10-CM

## 2024-04-04 DIAGNOSIS — I82.491 DEEP VEIN THROMBOSIS (DVT) OF OTHER VEIN OF RIGHT LOWER EXTREMITY, UNSPECIFIED CHRONICITY (HCC): ICD-10-CM

## 2024-04-04 DIAGNOSIS — D47.2 MGUS (MONOCLONAL GAMMOPATHY OF UNKNOWN SIGNIFICANCE): Primary | ICD-10-CM

## 2024-04-04 DIAGNOSIS — D68.59 PRIMARY HYPERCOAGULABLE STATE (HCC): ICD-10-CM

## 2024-04-04 LAB
ALBUMIN SERPL-MCNC: 3.9 G/DL (ref 3.2–4.6)
ALBUMIN/GLOB SERPL: 1.1 (ref 0.4–1.6)
ALP SERPL-CCNC: 64 U/L (ref 50–136)
ALT SERPL-CCNC: 29 U/L (ref 12–65)
ANION GAP SERPL CALC-SCNC: 6 MMOL/L (ref 2–11)
AST SERPL-CCNC: 15 U/L (ref 15–37)
BASOPHILS # BLD: 0 K/UL (ref 0–0.2)
BASOPHILS NFR BLD: 0 % (ref 0–2)
BILIRUB SERPL-MCNC: 0.5 MG/DL (ref 0.2–1.1)
BUN SERPL-MCNC: 17 MG/DL (ref 8–23)
CALCIUM SERPL-MCNC: 9.3 MG/DL (ref 8.3–10.4)
CHLORIDE SERPL-SCNC: 101 MMOL/L (ref 103–113)
CO2 SERPL-SCNC: 27 MMOL/L (ref 21–32)
CREAT SERPL-MCNC: 0.8 MG/DL (ref 0.6–1)
D DIMER PPP FEU-MCNC: <0.27 UG/ML(FEU)
DIFFERENTIAL METHOD BLD: ABNORMAL
EOSINOPHIL # BLD: 0 K/UL (ref 0–0.8)
EOSINOPHIL NFR BLD: 0 % (ref 0.5–7.8)
ERYTHROCYTE [DISTWIDTH] IN BLOOD BY AUTOMATED COUNT: 13.6 % (ref 11.9–14.6)
GLOBULIN SER CALC-MCNC: 3.6 G/DL (ref 2.8–4.5)
GLUCOSE SERPL-MCNC: 139 MG/DL (ref 65–100)
HCT VFR BLD AUTO: 42.5 % (ref 35.8–46.3)
HGB BLD-MCNC: 14.1 G/DL (ref 11.7–15.4)
IMM GRANULOCYTES # BLD AUTO: 0 K/UL (ref 0–0.5)
IMM GRANULOCYTES NFR BLD AUTO: 0 % (ref 0–5)
LYMPHOCYTES # BLD: 1.3 K/UL (ref 0.5–4.6)
LYMPHOCYTES NFR BLD: 14 % (ref 13–44)
MCH RBC QN AUTO: 31.2 PG (ref 26.1–32.9)
MCHC RBC AUTO-ENTMCNC: 33.2 G/DL (ref 31.4–35)
MCV RBC AUTO: 94 FL (ref 82–102)
MONOCYTES # BLD: 0.5 K/UL (ref 0.1–1.3)
MONOCYTES NFR BLD: 5 % (ref 4–12)
NEUTS SEG # BLD: 7.5 K/UL (ref 1.7–8.2)
NEUTS SEG NFR BLD: 81 % (ref 43–78)
NRBC # BLD: 0 K/UL (ref 0–0.2)
PLATELET # BLD AUTO: 243 K/UL (ref 150–450)
PMV BLD AUTO: 10.4 FL (ref 9.4–12.3)
POTASSIUM SERPL-SCNC: 4.1 MMOL/L (ref 3.5–5.1)
PROT SERPL-MCNC: 7.5 G/DL (ref 6.3–8.2)
RBC # BLD AUTO: 4.52 M/UL (ref 4.05–5.2)
SODIUM SERPL-SCNC: 134 MMOL/L (ref 136–146)
WBC # BLD AUTO: 9.3 K/UL (ref 4.3–11.1)

## 2024-04-04 PROCEDURE — 86334 IMMUNOFIX E-PHORESIS SERUM: CPT

## 2024-04-04 PROCEDURE — G8399 PT W/DXA RESULTS DOCUMENT: HCPCS | Performed by: INTERNAL MEDICINE

## 2024-04-04 PROCEDURE — 99215 OFFICE O/P EST HI 40 MIN: CPT | Performed by: INTERNAL MEDICINE

## 2024-04-04 PROCEDURE — 80053 COMPREHEN METABOLIC PANEL: CPT

## 2024-04-04 PROCEDURE — 3074F SYST BP LT 130 MM HG: CPT | Performed by: INTERNAL MEDICINE

## 2024-04-04 PROCEDURE — 82784 ASSAY IGA/IGD/IGG/IGM EACH: CPT

## 2024-04-04 PROCEDURE — 83521 IG LIGHT CHAINS FREE EACH: CPT

## 2024-04-04 PROCEDURE — 84165 PROTEIN E-PHORESIS SERUM: CPT

## 2024-04-04 PROCEDURE — G8427 DOCREV CUR MEDS BY ELIG CLIN: HCPCS | Performed by: INTERNAL MEDICINE

## 2024-04-04 PROCEDURE — 85025 COMPLETE CBC W/AUTO DIFF WBC: CPT

## 2024-04-04 PROCEDURE — 36415 COLL VENOUS BLD VENIPUNCTURE: CPT

## 2024-04-04 PROCEDURE — 1090F PRES/ABSN URINE INCON ASSESS: CPT | Performed by: INTERNAL MEDICINE

## 2024-04-04 PROCEDURE — 85379 FIBRIN DEGRADATION QUANT: CPT

## 2024-04-04 PROCEDURE — G8420 CALC BMI NORM PARAMETERS: HCPCS | Performed by: INTERNAL MEDICINE

## 2024-04-04 PROCEDURE — 3017F COLORECTAL CA SCREEN DOC REV: CPT | Performed by: INTERNAL MEDICINE

## 2024-04-04 PROCEDURE — 1123F ACP DISCUSS/DSCN MKR DOCD: CPT | Performed by: INTERNAL MEDICINE

## 2024-04-04 PROCEDURE — 3078F DIAST BP <80 MM HG: CPT | Performed by: INTERNAL MEDICINE

## 2024-04-04 PROCEDURE — 83090 ASSAY OF HOMOCYSTEINE: CPT

## 2024-04-04 PROCEDURE — 1036F TOBACCO NON-USER: CPT | Performed by: INTERNAL MEDICINE

## 2024-04-04 NOTE — PROGRESS NOTES
[place] [address]  Phone: [phone]           4/4/2024  Candelaria Marshall  1955  [id number]        Candelaria Marshall is a 68 year old  female who has returned to my clinic for a follow-up visit; she was initially referred to me by Dr. Alisia Sawyer. In 9/2023 she was found to have a right leg DVT, her partial Hypercoagulable work-up revealed an elevated Factor VIII level and compound heterozygosity for the MTHFR mutation without Hyperhomocysteinemia, she is presently being anti-coagulated with Eliquis. In 10/2023 she was diagnosed with MGUS, IgG Lambda ( low-risk group ).        ALLERGIES:    No known drug allergies.        FAMILY HISTORY:    Her daughter had a Pulmonary Embolism.        SOCIAL HISTORY:     She is  and lives with her . She used to work as a . She stopped smoking cigarettes in 1/1991.        PAST MEDICAL HISTORY:    Hypertension, Anxiety Disorder, Iron Deficiency Anemia, GERD, Allergic Rhinitis, Osteoarthritis, Depression, Migraines, Hyperlipidemia, MGUS, Sjogren's Syndrome, Raynaud's phenomenon and an underlying Hypercoagulable Disorder.        ROS:  The patient complained of fatigue and dry mouth; all other systems negative.        PHYSICAL EXAM:   The patient was alert, awake and oriented, no acute distress was noted. Oral examination did not reveal any mucosal lesions. Lymph node examination did not reveal any adenopathy. Neck examination revealed a supple neck, no thyromegaly or masses were noted. Chest examination revealed normal vesicular breath sounds. Heart examination revealed S-1 and S-2 with a 2/6 systolic murmur. Abdominal examination revealed a non-tender abdomen, bowel sounds were positive, no organomegaly could be appreciated. Examination of the extremities did not reveal any tenderness or erythema. Examination of the skin did not reveal any lesions.        KPS:  90.        LABORATORY

## 2024-04-04 NOTE — PATIENT INSTRUCTIONS
Patient Information from Today's Visit        Treatment Summary has been discussed and given to patient:N/A  Follow Up:   We will bring you back in September for a follow up with  on lab work prior.  Continue taking Eliquis as directed    Please refer to After Visit Summary or [employer] for upcoming appointment information. If you have any questions regarding your upcoming schedule please reach out to your care team through [employer] or call [phone].          -------------------------------------------------------------------------------------------------------------------  Please call our office at [phone] if you have any  of the following symptoms:   Fever of 100.5 or greater  Chills  Shortness of breath  Swelling or pain in one leg    After office hours an answering service is available and will contact a provider for emergencies or if you are experiencing any of the above symptoms.    Patient has My Chart.  My Chart log in information explained on the after visit summary printout at the check-out desk.    Funmilayo Herron MA

## 2024-04-05 LAB
HCYS SERPL-SCNC: 4.4 UMOL/L (ref 0–17.2)
KAPPA LC FREE SER-MCNC: 6.9 MG/L (ref 3.3–19.4)
KAPPA LC FREE/LAMBDA FREE SER: 0.53 (ref 0.26–1.65)
LAMBDA LC FREE SERPL-MCNC: 12.9 MG/L (ref 5.7–26.3)

## 2024-04-08 LAB
ALBUMIN SERPL ELPH-MCNC: 4 G/DL (ref 2.9–4.4)
ALBUMIN/GLOB SERPL: 1.4 (ref 0.7–1.7)
ALPHA1 GLOB SERPL ELPH-MCNC: 0.2 G/DL (ref 0–0.4)
ALPHA2 GLOB SERPL ELPH-MCNC: 0.7 G/DL (ref 0.4–1)
B-GLOBULIN SERPL ELPH-MCNC: 1 G/DL (ref 0.7–1.3)
GAMMA GLOB SERPL ELPH-MCNC: 1.1 G/DL (ref 0.4–1.8)
GLOBULIN SER-MCNC: 3 G/DL (ref 2.2–3.9)
IGA SERPL-MCNC: 318 MG/DL (ref 87–352)
IGG SERPL-MCNC: 1063 MG/DL (ref 586–1602)
IGM SERPL-MCNC: 104 MG/DL (ref 26–217)
INTERPRETATION SERPL IEP-IMP: ABNORMAL
M PROTEIN SERPL ELPH-MCNC: 0.2 G/DL
PROT SERPL-MCNC: 7 G/DL (ref 6–8.5)

## 2024-04-25 DIAGNOSIS — F41.9 ANXIETY: ICD-10-CM

## 2024-04-25 RX ORDER — ESCITALOPRAM OXALATE 20 MG/1
20 TABLET ORAL DAILY
Qty: 90 TABLET | Refills: 3 | OUTPATIENT
Start: 2024-04-25

## 2024-05-01 DIAGNOSIS — G47.00 INSOMNIA, UNSPECIFIED TYPE: ICD-10-CM

## 2024-05-02 RX ORDER — TRAZODONE HYDROCHLORIDE 50 MG/1
TABLET ORAL
Qty: 90 TABLET | Refills: 1 | Status: SHIPPED | OUTPATIENT
Start: 2024-05-02

## 2024-05-03 ENCOUNTER — HOSPITAL ENCOUNTER (OUTPATIENT)
Dept: MAMMOGRAPHY | Age: 69
End: 2024-05-03
Payer: MEDICARE

## 2024-05-03 DIAGNOSIS — N63.25 BREAST LUMP ON LEFT SIDE AT 9 O'CLOCK POSITION: ICD-10-CM

## 2024-05-03 PROCEDURE — G0279 TOMOSYNTHESIS, MAMMO: HCPCS

## 2024-05-03 PROCEDURE — 76642 ULTRASOUND BREAST LIMITED: CPT

## 2024-05-11 LAB
Lab: NORMAL
Lab: NORMAL
REFERENCE LAB: NORMAL

## 2024-05-19 DIAGNOSIS — G43.009 MIGRAINE WITHOUT AURA AND WITHOUT STATUS MIGRAINOSUS, NOT INTRACTABLE: ICD-10-CM

## 2024-05-21 RX ORDER — RIZATRIPTAN BENZOATE 10 MG/1
10 TABLET, ORALLY DISINTEGRATING ORAL
Qty: 12 TABLET | Refills: 2 | Status: SHIPPED | OUTPATIENT
Start: 2024-05-21

## 2024-05-28 ENCOUNTER — PATIENT MESSAGE (OUTPATIENT)
Dept: INTERNAL MEDICINE CLINIC | Facility: CLINIC | Age: 69
End: 2024-05-28

## 2024-05-29 RX ORDER — VALACYCLOVIR HYDROCHLORIDE 1 G/1
1000 TABLET, FILM COATED ORAL 2 TIMES DAILY
Qty: 12 TABLET | Refills: 0 | Status: SHIPPED | OUTPATIENT
Start: 2024-05-29

## 2024-05-29 NOTE — TELEPHONE ENCOUNTER
From: Candelaria Marshall  To: Dr. Alisia Sawyer  Sent: 5/28/2024 5:53 PM EDT  Subject: Valtrex    Hi!  Could you please send a prescription for 12 generic Valtex tablets to [facility] on [address]? Thx!    Candelaria

## 2024-07-16 DIAGNOSIS — I82.451 ACUTE DEEP VEIN THROMBOSIS (DVT) OF RIGHT PERONEAL VEIN (HCC): ICD-10-CM

## 2024-08-07 ENCOUNTER — OFFICE VISIT (OUTPATIENT)
Dept: ENT CLINIC | Age: 69
End: 2024-08-07
Payer: MEDICARE

## 2024-08-07 VITALS
WEIGHT: 140.4 LBS | DIASTOLIC BLOOD PRESSURE: 62 MMHG | SYSTOLIC BLOOD PRESSURE: 118 MMHG | HEIGHT: 65 IN | BODY MASS INDEX: 23.39 KG/M2

## 2024-08-07 DIAGNOSIS — R09.82 PND (POST-NASAL DRIP): Primary | Chronic | ICD-10-CM

## 2024-08-07 DIAGNOSIS — J30.9 ALLERGIC RHINITIS, UNSPECIFIED SEASONALITY, UNSPECIFIED TRIGGER: Chronic | ICD-10-CM

## 2024-08-07 DIAGNOSIS — J34.89 NASAL VESTIBULITIS: Chronic | ICD-10-CM

## 2024-08-07 PROCEDURE — 3074F SYST BP LT 130 MM HG: CPT | Performed by: PHYSICIAN ASSISTANT

## 2024-08-07 PROCEDURE — G8399 PT W/DXA RESULTS DOCUMENT: HCPCS | Performed by: PHYSICIAN ASSISTANT

## 2024-08-07 PROCEDURE — 99214 OFFICE O/P EST MOD 30 MIN: CPT | Performed by: PHYSICIAN ASSISTANT

## 2024-08-07 PROCEDURE — 3078F DIAST BP <80 MM HG: CPT | Performed by: PHYSICIAN ASSISTANT

## 2024-08-07 PROCEDURE — G8420 CALC BMI NORM PARAMETERS: HCPCS | Performed by: PHYSICIAN ASSISTANT

## 2024-08-07 PROCEDURE — 3017F COLORECTAL CA SCREEN DOC REV: CPT | Performed by: PHYSICIAN ASSISTANT

## 2024-08-07 PROCEDURE — G8427 DOCREV CUR MEDS BY ELIG CLIN: HCPCS | Performed by: PHYSICIAN ASSISTANT

## 2024-08-07 PROCEDURE — 1123F ACP DISCUSS/DSCN MKR DOCD: CPT | Performed by: PHYSICIAN ASSISTANT

## 2024-08-07 PROCEDURE — 1090F PRES/ABSN URINE INCON ASSESS: CPT | Performed by: PHYSICIAN ASSISTANT

## 2024-08-07 PROCEDURE — 1036F TOBACCO NON-USER: CPT | Performed by: PHYSICIAN ASSISTANT

## 2024-08-07 ASSESSMENT — ENCOUNTER SYMPTOMS
GASTROINTESTINAL NEGATIVE: 1
EYES NEGATIVE: 1
ALLERGIC/IMMUNOLOGIC NEGATIVE: 1
RESPIRATORY NEGATIVE: 1

## 2024-08-07 NOTE — PROGRESS NOTES
Candelaria Marshall is a 68 y.o. female presents today with c/o pnd. Saw allergist and was switched to Astelin and generic nasacort.  Allergic to dog, has 2 dogs, and dust mite. Sneezing bad again in the last 3 weeks. But has stopped Astelin because it doesn't taste well. No immunotherapy.     Chief Complaint   Patient presents with    Follow-up     6 month f/u for PND.  States that she was doing great but in the last 3 weeks she has had the PND again.  She has had a lot of sneezing.   Patient completed her referral to [facility].  Not causing any soreness in her throat.  Not using flonase but is using nasocort.  States it works better       Patient Active Problem List   Diagnosis    Urinary incontinence    Osteoarthritis    Menopause    Essential hypertension    Insomnia    Vertigo    Migraine without aura or status migrainosus    Seasonal allergic rhinitis due to pollen    Osteopenia of multiple sites    Anxiety and depression    GERD (gastroesophageal reflux disease)    Mixed hyperlipidemia    Raynaud's phenomenon    History of DVT (deep vein thrombosis)    Heterozygous for MTHFR gene mutation    MGUS (monoclonal gammopathy of unknown significance)    Positive MELISSA (antinuclear antibody)        Reviewed and updated this visit by provider:  Tobacco  Allergies  Meds  Problems  Med Hx  Surg Hx  Fam Hx         Review of Systems   Constitutional: Negative.    HENT:  Positive for postnasal drip.    Eyes: Negative.    Respiratory: Negative.     Cardiovascular: Negative.    Gastrointestinal: Negative.    Endocrine: Negative.    Genitourinary: Negative.    Musculoskeletal: Negative.    Skin: Negative.    Allergic/Immunologic: Negative.    Neurological: Negative.    Hematological: Negative.    Psychiatric/Behavioral: Negative.          /62 (Site: Left Upper Arm, Position: Sitting)   Ht 1.651 m (5' 5\")   Wt 63.7 kg (140 lb 6.4 oz)   BMI 23.36 kg/m²     Physical Exam:    General: Well developed, well

## 2024-09-04 ENCOUNTER — OFFICE VISIT (OUTPATIENT)
Dept: ONCOLOGY | Age: 69
End: 2024-09-04
Payer: MEDICARE

## 2024-09-04 ENCOUNTER — HOSPITAL ENCOUNTER (OUTPATIENT)
Dept: LAB | Age: 69
Discharge: HOME | End: 2024-09-07
Payer: MEDICARE

## 2024-09-04 VITALS
OXYGEN SATURATION: 100 % | DIASTOLIC BLOOD PRESSURE: 71 MMHG | SYSTOLIC BLOOD PRESSURE: 126 MMHG | RESPIRATION RATE: 16 BRPM | BODY MASS INDEX: 23.31 KG/M2 | WEIGHT: 139.9 LBS | TEMPERATURE: 98.3 F | HEART RATE: 60 BPM | HEIGHT: 65 IN

## 2024-09-04 DIAGNOSIS — I82.451 ACUTE DEEP VEIN THROMBOSIS (DVT) OF RIGHT PERONEAL VEIN (HCC): Primary | ICD-10-CM

## 2024-09-04 DIAGNOSIS — D68.59 PRIMARY HYPERCOAGULABLE STATE (HCC): ICD-10-CM

## 2024-09-04 DIAGNOSIS — D47.2 MGUS (MONOCLONAL GAMMOPATHY OF UNKNOWN SIGNIFICANCE): ICD-10-CM

## 2024-09-04 DIAGNOSIS — I82.491 DEEP VEIN THROMBOSIS (DVT) OF OTHER VEIN OF RIGHT LOWER EXTREMITY, UNSPECIFIED CHRONICITY (HCC): ICD-10-CM

## 2024-09-04 DIAGNOSIS — I10 HYPERTENSION, UNSPECIFIED TYPE: ICD-10-CM

## 2024-09-04 DIAGNOSIS — I82.491 DEEP VEIN THROMBOSIS (DVT) OF OTHER VEIN OF RIGHT LOWER EXTREMITY, UNSPECIFIED CHRONICITY (HCC): Primary | ICD-10-CM

## 2024-09-04 DIAGNOSIS — I82.451 ACUTE DEEP VEIN THROMBOSIS (DVT) OF RIGHT PERONEAL VEIN (HCC): ICD-10-CM

## 2024-09-04 LAB
ALBUMIN SERPL-MCNC: 3.7 G/DL (ref 3.2–4.6)
ALBUMIN/GLOB SERPL: 1.3 (ref 1–1.9)
ALP SERPL-CCNC: 51 U/L (ref 35–104)
ALT SERPL-CCNC: 34 U/L (ref 12–65)
ANION GAP SERPL CALC-SCNC: 11 MMOL/L (ref 9–18)
AST SERPL-CCNC: 28 U/L (ref 15–37)
BASOPHILS # BLD: 0 K/UL (ref 0–0.2)
BASOPHILS NFR BLD: 0 % (ref 0–2)
BILIRUB SERPL-MCNC: 0.5 MG/DL (ref 0–1.2)
BUN SERPL-MCNC: 18 MG/DL (ref 8–23)
CALCIUM SERPL-MCNC: 9.6 MG/DL (ref 8.8–10.2)
CHLORIDE SERPL-SCNC: 100 MMOL/L (ref 98–107)
CO2 SERPL-SCNC: 26 MMOL/L (ref 20–28)
CREAT SERPL-MCNC: 0.68 MG/DL (ref 0.6–1.1)
D DIMER PPP FEU-MCNC: <0.27 UG/ML(FEU)
DIFFERENTIAL METHOD BLD: NORMAL
EOSINOPHIL # BLD: 0.1 K/UL (ref 0–0.8)
EOSINOPHIL NFR BLD: 1 % (ref 0.5–7.8)
ERYTHROCYTE [DISTWIDTH] IN BLOOD BY AUTOMATED COUNT: 14 % (ref 11.9–14.6)
GLOBULIN SER CALC-MCNC: 2.9 G/DL (ref 2.3–3.5)
GLUCOSE SERPL-MCNC: 99 MG/DL (ref 70–99)
HCT VFR BLD AUTO: 40.6 % (ref 35.8–46.3)
HGB BLD-MCNC: 13.4 G/DL (ref 11.7–15.4)
IMM GRANULOCYTES # BLD AUTO: 0 K/UL (ref 0–0.5)
IMM GRANULOCYTES NFR BLD AUTO: 0 % (ref 0–5)
KAPPA LC FREE SER-MCNC: 7.3 MG/L (ref 2.4–20.7)
KAPPA LC FREE/LAMBDA FREE SER: 0.4 (ref 0.2–0.8)
LAMBDA LC FREE SERPL-MCNC: 19 MG/L (ref 4.2–27.7)
LYMPHOCYTES # BLD: 1.9 K/UL (ref 0.5–4.6)
LYMPHOCYTES NFR BLD: 23 % (ref 13–44)
MCH RBC QN AUTO: 32.5 PG (ref 26.1–32.9)
MCHC RBC AUTO-ENTMCNC: 33 G/DL (ref 31.4–35)
MCV RBC AUTO: 98.5 FL (ref 82–102)
MONOCYTES # BLD: 0.7 K/UL (ref 0.1–1.3)
MONOCYTES NFR BLD: 8 % (ref 4–12)
NEUTS SEG # BLD: 5.6 K/UL (ref 1.7–8.2)
NEUTS SEG NFR BLD: 68 % (ref 43–78)
NRBC # BLD: 0 K/UL (ref 0–0.2)
PLATELET # BLD AUTO: 239 K/UL (ref 150–450)
PMV BLD AUTO: 9.8 FL (ref 9.4–12.3)
POTASSIUM SERPL-SCNC: 4.2 MMOL/L (ref 3.5–5.1)
PROT SERPL-MCNC: 6.7 G/DL (ref 6.3–8.2)
RBC # BLD AUTO: 4.12 M/UL (ref 4.05–5.2)
SODIUM SERPL-SCNC: 137 MMOL/L (ref 136–145)
WBC # BLD AUTO: 8.2 K/UL (ref 4.3–11.1)

## 2024-09-04 PROCEDURE — G8427 DOCREV CUR MEDS BY ELIG CLIN: HCPCS | Performed by: INTERNAL MEDICINE

## 2024-09-04 PROCEDURE — G8420 CALC BMI NORM PARAMETERS: HCPCS | Performed by: INTERNAL MEDICINE

## 2024-09-04 PROCEDURE — 3074F SYST BP LT 130 MM HG: CPT | Performed by: INTERNAL MEDICINE

## 2024-09-04 PROCEDURE — 80053 COMPREHEN METABOLIC PANEL: CPT

## 2024-09-04 PROCEDURE — 3078F DIAST BP <80 MM HG: CPT | Performed by: INTERNAL MEDICINE

## 2024-09-04 PROCEDURE — G8399 PT W/DXA RESULTS DOCUMENT: HCPCS | Performed by: INTERNAL MEDICINE

## 2024-09-04 PROCEDURE — 85025 COMPLETE CBC W/AUTO DIFF WBC: CPT

## 2024-09-04 PROCEDURE — 99215 OFFICE O/P EST HI 40 MIN: CPT | Performed by: INTERNAL MEDICINE

## 2024-09-04 PROCEDURE — 86334 IMMUNOFIX E-PHORESIS SERUM: CPT

## 2024-09-04 PROCEDURE — 85379 FIBRIN DEGRADATION QUANT: CPT

## 2024-09-04 PROCEDURE — 3017F COLORECTAL CA SCREEN DOC REV: CPT | Performed by: INTERNAL MEDICINE

## 2024-09-04 PROCEDURE — 84165 PROTEIN E-PHORESIS SERUM: CPT

## 2024-09-04 PROCEDURE — 36415 COLL VENOUS BLD VENIPUNCTURE: CPT

## 2024-09-04 PROCEDURE — 82784 ASSAY IGA/IGD/IGG/IGM EACH: CPT

## 2024-09-04 PROCEDURE — 1090F PRES/ABSN URINE INCON ASSESS: CPT | Performed by: INTERNAL MEDICINE

## 2024-09-04 PROCEDURE — 1123F ACP DISCUSS/DSCN MKR DOCD: CPT | Performed by: INTERNAL MEDICINE

## 2024-09-04 PROCEDURE — 83521 IG LIGHT CHAINS FREE EACH: CPT

## 2024-09-04 PROCEDURE — 83090 ASSAY OF HOMOCYSTEINE: CPT

## 2024-09-04 PROCEDURE — 1036F TOBACCO NON-USER: CPT | Performed by: INTERNAL MEDICINE

## 2024-09-04 NOTE — PATIENT INSTRUCTIONS
Patient Information from Today's Visit    The members of your [facility] are listed below:    Physician Provider: Ji Cabrera Medical Oncologist  Advanced Practice Clinician: Yanelis Quiñonez NP  Registered Nurse: N/A  Navigator: N/A  Medical Assistant: Funmilayo HERRERA MA and Krista LEMA MA  : Nicole HERRERA   Supportive Care Services: Laila DANIEL LMSW    Diagnosis:   DVT/MGUS      Follow Up Instructions:   Lab work looks stable today   We will bring you back in January 2025 for a follow up with  and lab work prior.       Treatment Summary has been discussed and given to patient:N/A      Current Labs:   Hospital Outpatient Visit on 09/04/2024   Component Date Value Ref Range Status    WBC 09/04/2024 8.2  4.3 - 11.1 K/uL Final    RBC 09/04/2024 4.12  4.05 - 5.2 M/uL Final    Hemoglobin 09/04/2024 13.4  11.7 - 15.4 g/dL Final    Hematocrit 09/04/2024 40.6  35.8 - 46.3 % Final    MCV 09/04/2024 98.5  82.0 - 102.0 FL Final    MCH 09/04/2024 32.5  26.1 - 32.9 PG Final    MCHC 09/04/2024 33.0  31.4 - 35.0 g/dL Final    RDW 09/04/2024 14.0  11.9 - 14.6 % Final    Platelets 09/04/2024 239  150 - 450 K/uL Final    MPV 09/04/2024 9.8  9.4 - 12.3 FL Final    nRBC 09/04/2024 0.00  0.0 - 0.2 K/uL Final    **Note: Absolute NRBC parameter is now reported with Hemogram**    Neutrophils % 09/04/2024 68  43 - 78 % Final    Lymphocytes % 09/04/2024 23  13 - 44 % Final    Monocytes % 09/04/2024 8  4.0 - 12.0 % Final    Eosinophils % 09/04/2024 1  0.5 - 7.8 % Final    Basophils % 09/04/2024 0  0.0 - 2.0 % Final    Immature Granulocytes % 09/04/2024 0  0.0 - 5.0 % Final    Neutrophils Absolute 09/04/2024 5.6  1.7 - 8.2 K/UL Final    Lymphocytes Absolute 09/04/2024 1.9  0.5 - 4.6 K/UL Final    Monocytes Absolute 09/04/2024 0.7  0.1 - 1.3 K/UL Final    Eosinophils Absolute 09/04/2024 0.1  0.0 - 0.8 K/UL Final    Basophils Absolute 09/04/2024 0.0  0.0 - 0.2 K/UL Final    Immature Granulocytes Absolute 09/04/2024 0.0  0.0 -

## 2024-09-04 NOTE — PROGRESS NOTES
[place] [address]  Phone: [phone]           9/4/2024  Candelaria Marshall  1955  [id number]        Candelaria Masrhall is a 69 year old  female who has returned to my clinic for a follow-up visit; she was initially referred to me by Dr. Alisia Sawyer. In 9/2023 she was found to have a right leg DVT, her partial Hypercoagulable work-up revealed an elevated Factor VIII level and compound heterozygosity for the MTHFR mutation without Hyperhomocysteinemia, she was anti-coagulated with Eliquis for 1 year. In 10/2023 she was diagnosed with MGUS, IgG Lambda ( low-risk group ).        ALLERGIES:    No known drug allergies.        FAMILY HISTORY:    Her daughter had a Pulmonary Embolism.        SOCIAL HISTORY:     She is  and lives with her . She used to work as a . She stopped smoking cigarettes in 1/1991.        PAST MEDICAL HISTORY:    Hypertension, Anxiety Disorder, Iron Deficiency Anemia, GERD, Allergic Rhinitis, Osteoarthritis, Depression, Migraines, Hyperlipidemia, MGUS, Sjogren's Syndrome, Raynaud's phenomenon and an underlying Hypercoagulable Disorder.        ROS:  The patient complained of fatigue and dry mouth; all other systems negative.        PHYSICAL EXAM:   The patient was alert, awake and oriented, no acute distress was noted. Oral examination did not reveal any mucosal lesions. Lymph node examination did not reveal any adenopathy. Neck examination revealed a supple neck, no thyromegaly or masses were noted. Chest examination revealed normal vesicular breath sounds. Heart examination revealed S-1 and S-2 with a 2/6 systolic murmur. Abdominal examination revealed a non-tender abdomen, bowel sounds were positive, no organomegaly could be appreciated. Examination of the extremities did not reveal any tenderness or erythema. Examination of the skin did not reveal any lesions.        KPS:  90.        LABORATORY

## 2024-09-05 LAB — HCYS SERPL-SCNC: 5.8 UMOL/L (ref 0–17.2)

## 2024-09-07 DIAGNOSIS — I10 ESSENTIAL HYPERTENSION: ICD-10-CM

## 2024-09-08 LAB
ALBUMIN SERPL ELPH-MCNC: 3.6 G/DL (ref 2.9–4.4)
ALBUMIN/GLOB SERPL: 1.3 (ref 0.7–1.7)
ALPHA1 GLOB SERPL ELPH-MCNC: 0.2 G/DL (ref 0–0.4)
ALPHA2 GLOB SERPL ELPH-MCNC: 0.6 G/DL (ref 0.4–1)
B-GLOBULIN SERPL ELPH-MCNC: 1.2 G/DL (ref 0.7–1.3)
GAMMA GLOB SERPL ELPH-MCNC: 0.8 G/DL (ref 0.4–1.8)
GLOBULIN SER-MCNC: 2.8 G/DL (ref 2.2–3.9)
IGA SERPL-MCNC: 223 MG/DL (ref 87–352)
IGG SERPL-MCNC: 917 MG/DL (ref 586–1602)
IGM SERPL-MCNC: 98 MG/DL (ref 26–217)
INTERPRETATION SERPL IEP-IMP: ABNORMAL
M PROTEIN SERPL ELPH-MCNC: 0.3 G/DL
PROT SERPL-MCNC: 6.4 G/DL (ref 6–8.5)

## 2024-09-08 RX ORDER — ATENOLOL 50 MG/1
50 TABLET ORAL DAILY
Qty: 90 TABLET | Refills: 3 | OUTPATIENT
Start: 2024-09-08

## 2024-09-25 DIAGNOSIS — I82.491 DEEP VEIN THROMBOSIS (DVT) OF OTHER VEIN OF RIGHT LOWER EXTREMITY, UNSPECIFIED CHRONICITY (HCC): ICD-10-CM

## 2024-09-25 DIAGNOSIS — D68.59 PRIMARY HYPERCOAGULABLE STATE (HCC): ICD-10-CM

## 2024-09-25 DIAGNOSIS — G47.00 INSOMNIA, UNSPECIFIED TYPE: ICD-10-CM

## 2024-09-25 RX ORDER — TRAZODONE HYDROCHLORIDE 50 MG/1
TABLET, FILM COATED ORAL
Qty: 90 TABLET | Refills: 3 | OUTPATIENT
Start: 2024-09-25

## 2024-10-04 ENCOUNTER — CLINICAL DOCUMENTATION (OUTPATIENT)
Dept: ONCOLOGY | Age: 69
End: 2024-10-04

## 2024-10-04 ENCOUNTER — TELEPHONE (OUTPATIENT)
Dept: ONCOLOGY | Age: 69
End: 2024-10-04

## 2024-10-04 ENCOUNTER — HOSPITAL ENCOUNTER (OUTPATIENT)
Dept: ULTRASOUND IMAGING | Age: 69
Discharge: HOME | End: 2024-10-07
Attending: INTERNAL MEDICINE
Payer: MEDICARE

## 2024-10-04 ENCOUNTER — TELEPHONE (OUTPATIENT)
Dept: INTERNAL MEDICINE CLINIC | Facility: CLINIC | Age: 69
End: 2024-10-04

## 2024-10-04 DIAGNOSIS — D68.59 PRIMARY HYPERCOAGULABLE STATE (HCC): ICD-10-CM

## 2024-10-04 DIAGNOSIS — I82.491 DEEP VEIN THROMBOSIS (DVT) OF OTHER VEIN OF RIGHT LOWER EXTREMITY, UNSPECIFIED CHRONICITY (HCC): ICD-10-CM

## 2024-10-04 DIAGNOSIS — D68.59 PRIMARY HYPERCOAGULABLE STATE (HCC): Primary | ICD-10-CM

## 2024-10-04 DIAGNOSIS — I82.451 ACUTE DEEP VEIN THROMBOSIS (DVT) OF RIGHT PERONEAL VEIN (HCC): ICD-10-CM

## 2024-10-04 PROCEDURE — 93971 EXTREMITY STUDY: CPT

## 2024-10-04 NOTE — TELEPHONE ENCOUNTER
Patient experiencing pain in her right calf, like a deep ache, 6/10 on pain scale. Looks like \"a varicose vein behind it, bruised looking\". It is in the same place as her last blood clot. She had stopped her eliquis about 3-4 weeks ago as directed.  Message to Dr. Murphy team    Order placed for STAT US of RLE. Call placed to patient and radiology number supplied. She is going to reach out to them to make an appointment

## 2024-10-04 NOTE — TELEPHONE ENCOUNTER
Physician provider: Dr. Cabrera  Reason for today's call (Please detail here patients chief complaint): blood clot possible  Last office visit:09/04/24  Patient Callback Number: [phone]   Was callback number verified?: No  Calls to office within the last 48 hours?:No    Patient notified that their information will be routed to the [facility] clinical triage team for review. Patient is advised that they will receive a phone call from the triage department. If symptom related and symptoms worsen before receiving a call back, the patient has been advised to proceed to the nearest ED.    Patient is experiencing calf pain, and in last visit patient was told to stop Eliquis. Patient is concerned there is a blood clot in the calf. Patient states it is in the same calf and in the same place as the last time she had experienced a blood clot.

## 2024-10-04 NOTE — PROGRESS NOTES
Jeni from ultrasound calling with preliminary report. Results are right peroneal vein thrombus in right upper calf. She is not on blood thinners. Discontinued approx 1 month ago. Wilma Pena NP and Deepika Piedra NP.  Orders received and completed by Anna Saucedo NP.

## 2024-10-04 NOTE — TELEPHONE ENCOUNTER
We will start patient on Eliquis 5 mg. . Call to US to notify patient can go home and to get name of her pharmacy. Prescription pended to [facility].

## 2024-10-04 NOTE — TELEPHONE ENCOUNTER
SAVANNA: Received report from Dr. Arias, Radiology: DVT R peroneal vein noted on US;   Results discussed with patient; rx for [facility] 5 mg [facility], per Dr. Samuel, earlier today; she also went to  samples from the office;

## 2024-10-13 ENCOUNTER — TELEPHONE (OUTPATIENT)
Dept: ONCOLOGY | Age: 69
End: 2024-10-13

## 2024-10-13 NOTE — PROGRESS NOTES
Pt called reporting pain in calf, taking Eliquis compliantly. She did climb a ladder and wonders if pain could be a muscle strain. Recommended to monitor and call us beatriz tomorrow if pain continues/persists and we can order a Doppler. She verbalized understanding and appreciated my call.    Wilma Pena, LY

## 2024-10-14 DIAGNOSIS — I82.451 ACUTE DEEP VEIN THROMBOSIS (DVT) OF RIGHT PERONEAL VEIN (HCC): ICD-10-CM

## 2024-10-21 ENCOUNTER — HOSPITAL ENCOUNTER (OUTPATIENT)
Age: 69
Discharge: HOME | End: 2024-10-23
Payer: MEDICARE

## 2024-10-21 DIAGNOSIS — M48.07 LUMBOSACRAL STENOSIS: ICD-10-CM

## 2024-10-21 PROCEDURE — 72148 MRI LUMBAR SPINE W/O DYE: CPT

## 2024-10-23 ENCOUNTER — APPOINTMENT (OUTPATIENT)
Age: 69
End: 2024-10-23
Attending: ANESTHESIOLOGY
Payer: MEDICARE

## 2024-10-23 DIAGNOSIS — I82.451 ACUTE DEEP VEIN THROMBOSIS (DVT) OF RIGHT PERONEAL VEIN (HCC): Primary | ICD-10-CM

## 2024-10-23 DIAGNOSIS — D68.59 PRIMARY HYPERCOAGULABLE STATE (HCC): ICD-10-CM

## 2024-10-24 ENCOUNTER — HOSPITAL ENCOUNTER (OUTPATIENT)
Dept: LAB | Age: 69
Discharge: HOME | End: 2024-10-24
Payer: MEDICARE

## 2024-10-24 ENCOUNTER — OFFICE VISIT (OUTPATIENT)
Dept: ONCOLOGY | Age: 69
End: 2024-10-24
Payer: MEDICARE

## 2024-10-24 VITALS
SYSTOLIC BLOOD PRESSURE: 145 MMHG | HEART RATE: 55 BPM | HEIGHT: 65 IN | DIASTOLIC BLOOD PRESSURE: 77 MMHG | RESPIRATION RATE: 12 BRPM | OXYGEN SATURATION: 100 % | WEIGHT: 142.6 LBS | TEMPERATURE: 98.3 F | BODY MASS INDEX: 23.76 KG/M2

## 2024-10-24 DIAGNOSIS — I82.491 DEEP VEIN THROMBOSIS (DVT) OF OTHER VEIN OF RIGHT LOWER EXTREMITY, UNSPECIFIED CHRONICITY (HCC): ICD-10-CM

## 2024-10-24 DIAGNOSIS — I82.451 ACUTE DEEP VEIN THROMBOSIS (DVT) OF RIGHT PERONEAL VEIN (HCC): ICD-10-CM

## 2024-10-24 DIAGNOSIS — D47.2 MGUS (MONOCLONAL GAMMOPATHY OF UNKNOWN SIGNIFICANCE): ICD-10-CM

## 2024-10-24 DIAGNOSIS — D68.59 PRIMARY HYPERCOAGULABLE STATE (HCC): Primary | ICD-10-CM

## 2024-10-24 DIAGNOSIS — D68.59 PRIMARY HYPERCOAGULABLE STATE (HCC): ICD-10-CM

## 2024-10-24 LAB
ALBUMIN SERPL-MCNC: 4 G/DL (ref 3.2–4.6)
ALBUMIN/GLOB SERPL: 1.1 (ref 1–1.9)
ALP SERPL-CCNC: 70 U/L (ref 35–104)
ALT SERPL-CCNC: 27 U/L (ref 8–45)
ANION GAP SERPL CALC-SCNC: 8 MMOL/L (ref 9–18)
AST SERPL-CCNC: 25 U/L (ref 15–37)
BASOPHILS # BLD: 0.1 K/UL (ref 0–0.2)
BASOPHILS NFR BLD: 1 % (ref 0–2)
BILIRUB SERPL-MCNC: 0.3 MG/DL (ref 0–1.2)
BUN SERPL-MCNC: 17 MG/DL (ref 8–23)
CALCIUM SERPL-MCNC: 9.3 MG/DL (ref 8.8–10.2)
CHLORIDE SERPL-SCNC: 100 MMOL/L (ref 98–107)
CO2 SERPL-SCNC: 27 MMOL/L (ref 20–28)
CREAT SERPL-MCNC: 0.67 MG/DL (ref 0.6–1.1)
D DIMER PPP FEU-MCNC: <0.27 UG/ML(FEU)
DIFFERENTIAL METHOD BLD: NORMAL
EOSINOPHIL # BLD: 0.1 K/UL (ref 0–0.8)
EOSINOPHIL NFR BLD: 1 % (ref 0.5–7.8)
ERYTHROCYTE [DISTWIDTH] IN BLOOD BY AUTOMATED COUNT: 14.2 % (ref 11.9–14.6)
GLOBULIN SER CALC-MCNC: 3.7 G/DL (ref 2.3–3.5)
GLUCOSE SERPL-MCNC: 106 MG/DL (ref 70–99)
HCT VFR BLD AUTO: 44.7 % (ref 35.8–46.3)
HGB BLD-MCNC: 14.5 G/DL (ref 11.7–15.4)
IMM GRANULOCYTES # BLD AUTO: 0 K/UL (ref 0–0.5)
IMM GRANULOCYTES NFR BLD AUTO: 0 % (ref 0–5)
KAPPA LC FREE SER-MCNC: 9.1 MG/L (ref 2.4–20.7)
KAPPA LC FREE/LAMBDA FREE SER: 0.4 (ref 0.2–0.8)
LAMBDA LC FREE SERPL-MCNC: 24.1 MG/L (ref 4.2–27.7)
LYMPHOCYTES # BLD: 1.6 K/UL (ref 0.5–4.6)
LYMPHOCYTES NFR BLD: 21 % (ref 13–44)
MCH RBC QN AUTO: 32.4 PG (ref 26.1–32.9)
MCHC RBC AUTO-ENTMCNC: 32.4 G/DL (ref 31.4–35)
MCV RBC AUTO: 100 FL (ref 82–102)
MONOCYTES # BLD: 0.7 K/UL (ref 0.1–1.3)
MONOCYTES NFR BLD: 9 % (ref 4–12)
NEUTS SEG # BLD: 5.2 K/UL (ref 1.7–8.2)
NEUTS SEG NFR BLD: 68 % (ref 43–78)
NRBC # BLD: 0 K/UL (ref 0–0.2)
PLATELET # BLD AUTO: 243 K/UL (ref 150–450)
PMV BLD AUTO: 10 FL (ref 9.4–12.3)
POTASSIUM SERPL-SCNC: 4.2 MMOL/L (ref 3.5–5.1)
PROT SERPL-MCNC: 7.7 G/DL (ref 6.3–8.2)
RBC # BLD AUTO: 4.47 M/UL (ref 4.05–5.2)
SODIUM SERPL-SCNC: 135 MMOL/L (ref 136–145)
WBC # BLD AUTO: 7.6 K/UL (ref 4.3–11.1)

## 2024-10-24 PROCEDURE — 1160F RVW MEDS BY RX/DR IN RCRD: CPT | Performed by: INTERNAL MEDICINE

## 2024-10-24 PROCEDURE — G8399 PT W/DXA RESULTS DOCUMENT: HCPCS | Performed by: INTERNAL MEDICINE

## 2024-10-24 PROCEDURE — 85306 CLOT INHIBIT PROT S FREE: CPT

## 2024-10-24 PROCEDURE — 80053 COMPREHEN METABOLIC PANEL: CPT

## 2024-10-24 PROCEDURE — 3078F DIAST BP <80 MM HG: CPT | Performed by: INTERNAL MEDICINE

## 2024-10-24 PROCEDURE — 85379 FIBRIN DEGRADATION QUANT: CPT

## 2024-10-24 PROCEDURE — 1090F PRES/ABSN URINE INCON ASSESS: CPT | Performed by: INTERNAL MEDICINE

## 2024-10-24 PROCEDURE — 0077U IG PARAPROTEIN QUAL BLD/UR: CPT

## 2024-10-24 PROCEDURE — 99215 OFFICE O/P EST HI 40 MIN: CPT | Performed by: INTERNAL MEDICINE

## 2024-10-24 PROCEDURE — 3017F COLORECTAL CA SCREEN DOC REV: CPT | Performed by: INTERNAL MEDICINE

## 2024-10-24 PROCEDURE — 85613 RUSSELL VIPER VENOM DILUTED: CPT

## 2024-10-24 PROCEDURE — 36415 COLL VENOUS BLD VENIPUNCTURE: CPT

## 2024-10-24 PROCEDURE — 1159F MED LIST DOCD IN RCRD: CPT | Performed by: INTERNAL MEDICINE

## 2024-10-24 PROCEDURE — 85670 THROMBIN TIME PLASMA: CPT

## 2024-10-24 PROCEDURE — 1123F ACP DISCUSS/DSCN MKR DOCD: CPT | Performed by: INTERNAL MEDICINE

## 2024-10-24 PROCEDURE — 85705 THROMBOPLASTIN INHIBITION: CPT

## 2024-10-24 PROCEDURE — G8427 DOCREV CUR MEDS BY ELIG CLIN: HCPCS | Performed by: INTERNAL MEDICINE

## 2024-10-24 PROCEDURE — 85303 CLOT INHIBIT PROT C ACTIVITY: CPT

## 2024-10-24 PROCEDURE — G8484 FLU IMMUNIZE NO ADMIN: HCPCS | Performed by: INTERNAL MEDICINE

## 2024-10-24 PROCEDURE — 85025 COMPLETE CBC W/AUTO DIFF WBC: CPT

## 2024-10-24 PROCEDURE — 83521 IG LIGHT CHAINS FREE EACH: CPT

## 2024-10-24 PROCEDURE — 83090 ASSAY OF HOMOCYSTEINE: CPT

## 2024-10-24 PROCEDURE — G8420 CALC BMI NORM PARAMETERS: HCPCS | Performed by: INTERNAL MEDICINE

## 2024-10-24 PROCEDURE — 1036F TOBACCO NON-USER: CPT | Performed by: INTERNAL MEDICINE

## 2024-10-24 PROCEDURE — 1125F AMNT PAIN NOTED PAIN PRSNT: CPT | Performed by: INTERNAL MEDICINE

## 2024-10-24 PROCEDURE — 85305 CLOT INHIBIT PROT S TOTAL: CPT

## 2024-10-24 PROCEDURE — 3077F SYST BP >= 140 MM HG: CPT | Performed by: INTERNAL MEDICINE

## 2024-10-24 PROCEDURE — 85300 ANTITHROMBIN III ACTIVITY: CPT

## 2024-10-24 PROCEDURE — 82784 ASSAY IGA/IGD/IGG/IGM EACH: CPT

## 2024-10-24 PROCEDURE — 85732 THROMBOPLASTIN TIME PARTIAL: CPT

## 2024-10-24 RX ORDER — CEPHALEXIN 500 MG/1
CAPSULE ORAL
COMMUNITY
Start: 2024-10-15 | End: 2024-10-25 | Stop reason: ALTCHOICE

## 2024-10-24 ASSESSMENT — PATIENT HEALTH QUESTIONNAIRE - PHQ9
SUM OF ALL RESPONSES TO PHQ QUESTIONS 1-9: 0
SUM OF ALL RESPONSES TO PHQ QUESTIONS 1-9: 0
2. FEELING DOWN, DEPRESSED OR HOPELESS: NOT AT ALL
SUM OF ALL RESPONSES TO PHQ QUESTIONS 1-9: 0
SUM OF ALL RESPONSES TO PHQ9 QUESTIONS 1 & 2: 0
1. LITTLE INTEREST OR PLEASURE IN DOING THINGS: NOT AT ALL
SUM OF ALL RESPONSES TO PHQ QUESTIONS 1-9: 0

## 2024-10-24 NOTE — PROGRESS NOTES
the skin did not reveal any lesions.        KPS:  90.        LABORATORY INVESTIGATIONS:  CBC showed a WBC count of 7.6, ANC was 5.2, Hemoglobin was 14.5 and Platelets were 243. Medical problems and test results were reviewed with the patient today.        ASSESSMENT:    right leg DVT; Primary Hypercoagulable State; MGUS, IgG Lambda. I spent 42 minutes on the day of the visit, managing the care of this patient.        PLAN:     She should continue taking Eliquis 5 mg BID and Folbic 1 tablet every day; at her next clinic visit I will re-check her CBC, CMP, D-dimers, Homocysteine level, Kappa/Lambda level and SPEP with [employer].        Ji Cabrera MD  Hematology/BMT

## 2024-10-24 NOTE — PATIENT INSTRUCTIONS
or Emergency Room visits within 24 hours of discharge.    -------------------------------------------------------------------------------------------------------------------  Please call our office at [phone] if you have any  of the following symptoms:   Fever of 100.5 or greater  Chills  Shortness of breath  Swelling or pain in one leg    After office hours an answering service is available and will contact a provider for emergencies or if you are experiencing any of the above symptoms.        Krista Browning CMA.

## 2024-10-25 ENCOUNTER — OFFICE VISIT (OUTPATIENT)
Dept: RHEUMATOLOGY | Age: 69
End: 2024-10-25

## 2024-10-25 VITALS
HEART RATE: 47 BPM | SYSTOLIC BLOOD PRESSURE: 138 MMHG | WEIGHT: 141.8 LBS | BODY MASS INDEX: 23.63 KG/M2 | DIASTOLIC BLOOD PRESSURE: 80 MMHG | HEIGHT: 65 IN

## 2024-10-25 DIAGNOSIS — R76.8 POSITIVE ANA (ANTINUCLEAR ANTIBODY): Primary | ICD-10-CM

## 2024-10-25 DIAGNOSIS — M19.042 PRIMARY OSTEOARTHRITIS OF BOTH HANDS: ICD-10-CM

## 2024-10-25 DIAGNOSIS — M19.041 PRIMARY OSTEOARTHRITIS OF BOTH HANDS: ICD-10-CM

## 2024-10-25 LAB
APTT SCREEN TO CONFIRM RATIO: 1.05 RATIO (ref 0–1.34)
AT III PPP CHRO-ACNC: 145 % (ref 75–135)
CONFIRM APTT/NORMAL: 39 SEC (ref 0–47.6)
DRVVT RATIO: 1.1 RATIO (ref 0.8–1.2)
HCYS SERPL-SCNC: 7.1 UMOL/L (ref 0–17.2)
LA 2 SCREEN W REFLEX-IMP: ABNORMAL
MIXING DRVVT: 42.7 SEC (ref 0–40.4)
PROT C PPP-ACNC: 171 % (ref 73–180)
PROT S AG ACT/NOR PPP IA: 90 % (ref 60–150)
PROT S FREE AG ACT/NOR PPP IA: 121 % (ref 61–136)
SCREEN APTT: 32.9 SEC (ref 0–43.5)
SCREEN DRVVT: 53.9 SEC (ref 0–47)
THROMBIN TIME: 18.4 SEC (ref 0–23)

## 2024-10-25 ASSESSMENT — ROUTINE ASSESSMENT OF PATIENT INDEX DATA (RAPID3)
ON A SCALE OF ONE TO TEN, HOW MUCH OF A PROBLEM HAS UNUSUAL FATIGUE OR TIREDNESS BEEN FOR YOU OVER THE PAST WEEK?: 7
ON A SCALE OF ONE TO TEN, HOW DIFFICULT WAS IT FOR YOU TO COMPLETE THE LISTED DAILY PHYSICAL TASKS OVER THE LAST WEEK: 0.0
ON A SCALE OF ONE TO TEN, CONSIDERING ALL THE WAYS IN WHICH ILLNESS AND HEALTH CONDITIONS MAY AFFECT YOU AT THIS TIME, PLEASE INDICATE BELOW HOW YOU ARE DOING:: 2
ON A SCALE OF ONE TO TEN, HOW MUCH PAIN HAVE YOU HAD BECAUSE OF YOUR CONDITION OVER THE PAST WEEK?: 3

## 2024-10-25 ASSESSMENT — JOINT PAIN
TOTAL NUMBER OF SWOLLEN JOINTS: 0
TOTAL NUMBER OF TENDER JOINTS: 4

## 2024-10-25 NOTE — PROGRESS NOTES
detect.                --------------------------------------------------------------------------------------------------------------------------------------------------------------------------------------------------------------------------------

## 2024-10-29 LAB — IMMUNOLOGIST REVIEW: NORMAL

## 2024-10-31 ENCOUNTER — LAB (OUTPATIENT)
Dept: INTERNAL MEDICINE CLINIC | Facility: CLINIC | Age: 69
End: 2024-10-31

## 2024-10-31 ENCOUNTER — OFFICE VISIT (OUTPATIENT)
Dept: INTERNAL MEDICINE CLINIC | Facility: CLINIC | Age: 69
End: 2024-10-31

## 2024-10-31 VITALS
OXYGEN SATURATION: 100 % | HEART RATE: 55 BPM | BODY MASS INDEX: 23.53 KG/M2 | WEIGHT: 141.4 LBS | SYSTOLIC BLOOD PRESSURE: 142 MMHG | TEMPERATURE: 97.1 F | DIASTOLIC BLOOD PRESSURE: 78 MMHG

## 2024-10-31 DIAGNOSIS — R53.83 OTHER FATIGUE: Primary | ICD-10-CM

## 2024-10-31 DIAGNOSIS — N89.8 VAGINAL ITCHING: ICD-10-CM

## 2024-10-31 DIAGNOSIS — R53.83 OTHER FATIGUE: ICD-10-CM

## 2024-10-31 LAB — TSH W FREE THYROID IF ABNORMAL: 0.99 UIU/ML (ref 0.27–4.2)

## 2024-10-31 RX ORDER — FLUCONAZOLE 150 MG/1
150 TABLET ORAL ONCE
Qty: 1 TABLET | Refills: 0 | Status: SHIPPED | OUTPATIENT
Start: 2024-10-31 | End: 2024-10-31

## 2024-10-31 ASSESSMENT — ENCOUNTER SYMPTOMS
BLOOD IN STOOL: 0
ABDOMINAL PAIN: 0
DIARRHEA: 0
WHEEZING: 0
VOMITING: 0
COUGH: 0
SHORTNESS OF BREATH: 0
RHINORRHEA: 0
NAUSEA: 0
CONSTIPATION: 0
SORE THROAT: 0
SINUS PRESSURE: 0

## 2024-10-31 NOTE — PROGRESS NOTES
[facility]      10/31/2024    Patient Name: Candelaria Marshall  :  1955      Chief Complaint:  Chief Complaint   Patient presents with    Fatigue         HPI  Patient presents today with complaint of fatigue. Symptoms started about 3 months ago. For the past couple of months, she has been having to take a nap almost daily. No recent medication changes. Follows a healthy diet and stays well-hydrated. Has been less active for the past few weeks than she has been previously. Says that her  tells her she snores occasionally but not every night. No witnessed apneic episodes. She wakes up feeling refreshed. She reports occasional palpitations \"for years\" which have been no worse recently than baseline. EKG completed in our office on 3/28/24 showing sinus bradycardia. No CP, SOB. Feels that Lexapro controls her depression and anxiety fairly well but does admit to feeling stressed about the upcoming election.     Also with complaint of what she believes to be a vaginal yeast infection. She completed a 1-day Monistat earlier this week. Symptoms are improved but not completely resolved.         Past Medical History:   Diagnosis Date    Acute DVT (deep venous thrombosis) (HCC)     infrapopliteal peroneal vein.    Allergic rhinitis     Anticoagulant long-term use     Anxiety and depression     Chronic back pain     GERD (gastroesophageal reflux disease)     Hearing loss     I have hearing aids now.    History of bone density study 2023    osteopenia    History of mammogram 2023    HTN (hypertension)     Hyperlipidemia     Menopause     Migraine     Osteoarthritis 2000    Osteopenia     Rash 2014    Seasonal allergies     Superficial thrombosis of lower extremity, right     lesser saphenous vein    Tinnitus     TMJ dysfunction        Past Surgical History:   Procedure Laterality Date    BREAST BIOPSY Right     BUNIONECTOMY Left     COLONOSCOPY      due for repeat

## 2024-11-05 DIAGNOSIS — I10 ESSENTIAL HYPERTENSION: ICD-10-CM

## 2024-11-05 DIAGNOSIS — G47.00 INSOMNIA, UNSPECIFIED TYPE: ICD-10-CM

## 2024-11-05 DIAGNOSIS — I82.491 DEEP VEIN THROMBOSIS (DVT) OF OTHER VEIN OF RIGHT LOWER EXTREMITY, UNSPECIFIED CHRONICITY (HCC): ICD-10-CM

## 2024-11-05 DIAGNOSIS — D68.59 PRIMARY HYPERCOAGULABLE STATE (HCC): ICD-10-CM

## 2024-11-06 RX ORDER — ATENOLOL 50 MG/1
50 TABLET ORAL DAILY
Qty: 90 TABLET | Refills: 3 | Status: SHIPPED | OUTPATIENT
Start: 2024-11-06

## 2024-11-06 RX ORDER — TRAZODONE HYDROCHLORIDE 50 MG/1
TABLET, FILM COATED ORAL
Qty: 90 TABLET | Refills: 1 | Status: SHIPPED | OUTPATIENT
Start: 2024-11-06

## 2024-11-13 ENCOUNTER — TELEPHONE (OUTPATIENT)
Dept: ONCOLOGY | Age: 69
End: 2024-11-13

## 2024-11-13 DIAGNOSIS — I82.491 DEEP VEIN THROMBOSIS (DVT) OF OTHER VEIN OF RIGHT LOWER EXTREMITY, UNSPECIFIED CHRONICITY (HCC): ICD-10-CM

## 2024-11-13 DIAGNOSIS — D68.59 PRIMARY HYPERCOAGULABLE STATE (HCC): ICD-10-CM

## 2024-11-13 DIAGNOSIS — K21.9 GASTROESOPHAGEAL REFLUX DISEASE, UNSPECIFIED WHETHER ESOPHAGITIS PRESENT: ICD-10-CM

## 2024-11-13 NOTE — TELEPHONE ENCOUNTER
Physician provider: Dr. Cabrera  Reason for today's call (Please detail here patients chief complaint): Rx refill  Last office visit:n/a  Patient Callback Number: [phone]  Was callback number verified?: Yes  Preferred pharmacy (If refill request): [facility]  Calls to office within the last 48 hours?:No    Patient notified that their information will be routed to the [facility] clinical triage team for review. Patient is advised that they will receive a phone call from the triage department. If symptom related and symptoms worsen before receiving a call back, the patient has been advised to proceed to the nearest ED.          Rx refill Folbic    [facility]    Pt would like Rx sent to [facility] in [place] instead of [facility]    [phone]

## 2024-11-13 NOTE — TELEPHONE ENCOUNTER
Medication Requested: Folbic    Date last prescribed: 11/16/23    [facility]    Action Taken: Chart reviewed, refill pended and routed for signature.

## 2024-11-14 ENCOUNTER — OFFICE VISIT (OUTPATIENT)
Dept: INTERNAL MEDICINE CLINIC | Facility: CLINIC | Age: 69
End: 2024-11-14

## 2024-11-14 VITALS
SYSTOLIC BLOOD PRESSURE: 120 MMHG | WEIGHT: 146.6 LBS | HEIGHT: 65 IN | TEMPERATURE: 97 F | HEART RATE: 50 BPM | OXYGEN SATURATION: 98 % | DIASTOLIC BLOOD PRESSURE: 86 MMHG | BODY MASS INDEX: 24.43 KG/M2

## 2024-11-14 DIAGNOSIS — G89.29 ACUTE ON CHRONIC LOW BACK PAIN: ICD-10-CM

## 2024-11-14 DIAGNOSIS — M54.42 LEFT-SIDED LOW BACK PAIN WITH LEFT-SIDED SCIATICA, UNSPECIFIED CHRONICITY: Primary | ICD-10-CM

## 2024-11-14 DIAGNOSIS — M54.50 ACUTE ON CHRONIC LOW BACK PAIN: ICD-10-CM

## 2024-11-14 RX ORDER — BACLOFEN 10 MG/1
10 TABLET ORAL 3 TIMES DAILY
COMMUNITY

## 2024-11-14 RX ORDER — METHYLPREDNISOLONE 4 MG/1
TABLET ORAL
Qty: 21 TABLET | Refills: 0 | Status: SHIPPED | OUTPATIENT
Start: 2024-11-14 | End: 2024-11-20

## 2024-11-14 RX ORDER — FOLIC ACID-PYRIDOXINE-CYANOCOBALAMIN TAB 2.5-25-2 MG 2.5-25-2 MG
1 TAB ORAL DAILY
Qty: 90 TABLET | Refills: 3 | Status: SHIPPED | OUTPATIENT
Start: 2024-11-14 | End: 2025-11-09

## 2024-11-14 ASSESSMENT — ENCOUNTER SYMPTOMS
NAUSEA: 0
BACK PAIN: 1
COUGH: 0
SHORTNESS OF BREATH: 0
ABDOMINAL PAIN: 0
VOMITING: 0

## 2024-11-14 NOTE — PROGRESS NOTES
Pt informed of chemo education class on 1/23 at 11. He verbalized an understanding.  
UPPER GASTROINTESTINAL ENDOSCOPY  ?       Family History   Problem Relation Age of Onset    Stroke Mother         Brain Aneurism    Heart Disease Mother     High Blood Pressure Mother     Osteoarthritis Mother     Stroke Father     Prostate Cancer Father     Cancer Father         Prostate    High Blood Pressure Father     Hearing Loss Father     Dementia Sister     Hypertension Brother     Hearing Loss Brother     Migraines Sister     Hearing Loss Sister     Hearing Loss Brother     Breast Cancer Neg Hx        Social History     Tobacco Use    Smoking status: Former     Current packs/day: 0.00     Average packs/day: 0.5 packs/day for 22.0 years (11.0 ttl pk-yrs)     Types: Cigarettes     Start date: 1969     Quit date: 1991     Years since quittin.8     Passive exposure: Never    Smokeless tobacco: Never    Tobacco comments:     < 1 pack per day, age 14 - 34   Vaping Use    Vaping status: Never Used   Substance Use Topics    Alcohol use: Yes     Alcohol/week: 7.0 standard drinks of alcohol     Types: 7 Glasses of wine per week     Comment: Recently cut back (2 months)    Drug use: Not Currently     Frequency: 3.0 times per week     Types: Marijuana (Weed)     Comment: Back in the 70's       Current Outpatient Medications:     baclofen (LIORESAL) 10 MG tablet, Take 1 tablet by mouth 3 times daily, Disp: , Rfl:     methylPREDNISolone (MEDROL DOSEPACK) 4 MG tablet, Take by mouth., Disp: 21 tablet, Rfl: 0    atenolol (TENORMIN) 50 MG tablet, Take 1 tablet by mouth daily, Disp: 90 tablet, Rfl: 3    traZODone (DESYREL) 50 MG tablet, TAKE 1 TABLET BY MOUTH EVERY DAY AT NIGHT, Disp: 90 tablet, Rfl: 1    apixaban (ELIQUIS) 5 MG TABS tablet, Take 1 tablet by mouth 2 times daily, Disp: 60 tablet, Rfl: 5    estradiol (ESTRACE) 0.1 MG/GM vaginal cream, Place 2 g vaginally daily INSERT 2 G INTO VAGINA DAILY., Disp: 42.5 g, Rfl: 0    valACYclovir (VALTREX) 1 g tablet, Take 1 tablet by mouth 2 times daily, Disp: 12

## 2024-11-25 ENCOUNTER — OFFICE VISIT (OUTPATIENT)
Dept: INTERNAL MEDICINE CLINIC | Facility: CLINIC | Age: 69
End: 2024-11-25
Payer: MEDICARE

## 2024-11-25 VITALS
DIASTOLIC BLOOD PRESSURE: 70 MMHG | HEIGHT: 65 IN | WEIGHT: 146.6 LBS | OXYGEN SATURATION: 99 % | BODY MASS INDEX: 24.43 KG/M2 | SYSTOLIC BLOOD PRESSURE: 114 MMHG | TEMPERATURE: 97.3 F | HEART RATE: 58 BPM

## 2024-11-25 DIAGNOSIS — N39.0 ACUTE UTI: ICD-10-CM

## 2024-11-25 DIAGNOSIS — R30.0 DYSURIA: ICD-10-CM

## 2024-11-25 DIAGNOSIS — M48.061 SPINAL STENOSIS OF LUMBAR REGION WITHOUT NEUROGENIC CLAUDICATION: ICD-10-CM

## 2024-11-25 DIAGNOSIS — R30.0 DYSURIA: Primary | ICD-10-CM

## 2024-11-25 DIAGNOSIS — M54.42 LEFT-SIDED LOW BACK PAIN WITH LEFT-SIDED SCIATICA, UNSPECIFIED CHRONICITY: ICD-10-CM

## 2024-11-25 LAB
BILIRUBIN, URINE, POC: NEGATIVE
BLOOD URINE, POC: NEGATIVE
GLUCOSE URINE, POC: NEGATIVE
KETONES, URINE, POC: NEGATIVE
LEUKOCYTE ESTERASE, URINE, POC: NEGATIVE
NITRITE, URINE, POC: NEGATIVE
PH, URINE, POC: 7.5 (ref 4.6–8)
PROTEIN,URINE, POC: NEGATIVE
SPECIFIC GRAVITY, URINE, POC: 1.01 (ref 1–1.03)
URINALYSIS CLARITY, POC: CLEAR
URINALYSIS COLOR, POC: YELLOW
UROBILINOGEN, POC: NORMAL

## 2024-11-25 PROCEDURE — 1125F AMNT PAIN NOTED PAIN PRSNT: CPT | Performed by: PHYSICIAN ASSISTANT

## 2024-11-25 PROCEDURE — 99214 OFFICE O/P EST MOD 30 MIN: CPT | Performed by: PHYSICIAN ASSISTANT

## 2024-11-25 PROCEDURE — G8484 FLU IMMUNIZE NO ADMIN: HCPCS | Performed by: PHYSICIAN ASSISTANT

## 2024-11-25 PROCEDURE — 3078F DIAST BP <80 MM HG: CPT | Performed by: PHYSICIAN ASSISTANT

## 2024-11-25 PROCEDURE — 1159F MED LIST DOCD IN RCRD: CPT | Performed by: PHYSICIAN ASSISTANT

## 2024-11-25 PROCEDURE — G8427 DOCREV CUR MEDS BY ELIG CLIN: HCPCS | Performed by: PHYSICIAN ASSISTANT

## 2024-11-25 PROCEDURE — 3017F COLORECTAL CA SCREEN DOC REV: CPT | Performed by: PHYSICIAN ASSISTANT

## 2024-11-25 PROCEDURE — 1036F TOBACCO NON-USER: CPT | Performed by: PHYSICIAN ASSISTANT

## 2024-11-25 PROCEDURE — G8399 PT W/DXA RESULTS DOCUMENT: HCPCS | Performed by: PHYSICIAN ASSISTANT

## 2024-11-25 PROCEDURE — 81003 URINALYSIS AUTO W/O SCOPE: CPT | Performed by: PHYSICIAN ASSISTANT

## 2024-11-25 PROCEDURE — 1090F PRES/ABSN URINE INCON ASSESS: CPT | Performed by: PHYSICIAN ASSISTANT

## 2024-11-25 PROCEDURE — G8420 CALC BMI NORM PARAMETERS: HCPCS | Performed by: PHYSICIAN ASSISTANT

## 2024-11-25 PROCEDURE — 1123F ACP DISCUSS/DSCN MKR DOCD: CPT | Performed by: PHYSICIAN ASSISTANT

## 2024-11-25 PROCEDURE — 3074F SYST BP LT 130 MM HG: CPT | Performed by: PHYSICIAN ASSISTANT

## 2024-11-25 RX ORDER — NITROFURANTOIN 25; 75 MG/1; MG/1
100 CAPSULE ORAL 2 TIMES DAILY
Qty: 28 CAPSULE | Refills: 0 | Status: SHIPPED | OUTPATIENT
Start: 2024-11-25 | End: 2024-12-09

## 2024-11-25 ASSESSMENT — PATIENT HEALTH QUESTIONNAIRE - PHQ9
SUM OF ALL RESPONSES TO PHQ QUESTIONS 1-9: 2
1. LITTLE INTEREST OR PLEASURE IN DOING THINGS: SEVERAL DAYS
2. FEELING DOWN, DEPRESSED OR HOPELESS: SEVERAL DAYS
SUM OF ALL RESPONSES TO PHQ QUESTIONS 1-9: 2
SUM OF ALL RESPONSES TO PHQ9 QUESTIONS 1 & 2: 2

## 2024-11-25 ASSESSMENT — ENCOUNTER SYMPTOMS: BACK PAIN: 1

## 2024-11-25 NOTE — PROGRESS NOTES
normal.      Breath sounds: Normal breath sounds.   Abdominal:      Tenderness: There is no right CVA tenderness or left CVA tenderness.   Skin:     General: Skin is warm and dry.   Neurological:      Mental Status: She is alert and oriented to person, place, and time.   Psychiatric:         Mood and Affect: Mood normal.         Behavior: Behavior normal.        ASSESSMENT AND PLAN:  Candelaria was seen today for urinary pain.    Diagnoses and all orders for this visit:    Dysuria  -     AMB POC URINALYSIS DIP STICK AUTO W/O MICRO  -     Culture, Urine; Future    Acute UTI  -     nitrofurantoin, macrocrystal-monohydrate, (MACROBID) 100 MG capsule; Take 1 capsule by mouth 2 times daily for 14 days    Left-sided low back pain with left-sided sciatica, unspecified chronicity  -     External Referral To Orthopedic Surgery    Spinal stenosis of lumbar region without neurogenic claudication  -     External Referral To Orthopedic Surgery    UA normal, will check culture. Initiate treatment with macrobid until culture returns. Placed new referral to ortho/pain mgt per patient request.     Juana Baptiste PA-C

## 2024-11-26 ASSESSMENT — ENCOUNTER SYMPTOMS: GASTROINTESTINAL NEGATIVE: 1

## 2024-11-27 LAB
BACTERIA SPEC CULT: NORMAL
SERVICE CMNT-IMP: NORMAL

## 2024-12-02 ENCOUNTER — TELEPHONE (OUTPATIENT)
Dept: INTERNAL MEDICINE CLINIC | Facility: CLINIC | Age: 69
End: 2024-12-02

## 2024-12-02 NOTE — TELEPHONE ENCOUNTER
Pt called, states she needs her referral to Dr. Martinez to be re sent. Pt states she was told that their office has not yet received it.

## 2024-12-04 ENCOUNTER — PATIENT MESSAGE (OUTPATIENT)
Dept: INTERNAL MEDICINE CLINIC | Facility: CLINIC | Age: 69
End: 2024-12-04

## 2024-12-05 RX ORDER — VALACYCLOVIR HYDROCHLORIDE 1 G/1
1000 TABLET, FILM COATED ORAL 2 TIMES DAILY
Qty: 12 TABLET | Refills: 0 | Status: SHIPPED | OUTPATIENT
Start: 2024-12-05

## 2025-01-25 SDOH — ECONOMIC STABILITY: FOOD INSECURITY: WITHIN THE PAST 12 MONTHS, THE FOOD YOU BOUGHT JUST DIDN'T LAST AND YOU DIDN'T HAVE MONEY TO GET MORE.: NEVER TRUE

## 2025-01-25 SDOH — ECONOMIC STABILITY: FOOD INSECURITY: WITHIN THE PAST 12 MONTHS, YOU WORRIED THAT YOUR FOOD WOULD RUN OUT BEFORE YOU GOT MONEY TO BUY MORE.: NEVER TRUE

## 2025-01-25 SDOH — ECONOMIC STABILITY: TRANSPORTATION INSECURITY
IN THE PAST 12 MONTHS, HAS THE LACK OF TRANSPORTATION KEPT YOU FROM MEDICAL APPOINTMENTS OR FROM GETTING MEDICATIONS?: NO

## 2025-01-25 SDOH — HEALTH STABILITY: PHYSICAL HEALTH: ON AVERAGE, HOW MANY MINUTES DO YOU ENGAGE IN EXERCISE AT THIS LEVEL?: 20 MIN

## 2025-01-25 SDOH — HEALTH STABILITY: PHYSICAL HEALTH: ON AVERAGE, HOW MANY DAYS PER WEEK DO YOU ENGAGE IN MODERATE TO STRENUOUS EXERCISE (LIKE A BRISK WALK)?: 7 DAYS

## 2025-01-25 SDOH — ECONOMIC STABILITY: INCOME INSECURITY: IN THE LAST 12 MONTHS, WAS THERE A TIME WHEN YOU WERE NOT ABLE TO PAY THE MORTGAGE OR RENT ON TIME?: NO

## 2025-01-25 ASSESSMENT — PATIENT HEALTH QUESTIONNAIRE - PHQ9
2. FEELING DOWN, DEPRESSED OR HOPELESS: NOT AT ALL
SUM OF ALL RESPONSES TO PHQ QUESTIONS 1-9: 0
SUM OF ALL RESPONSES TO PHQ QUESTIONS 1-9: 0
SUM OF ALL RESPONSES TO PHQ9 QUESTIONS 1 & 2: 0
SUM OF ALL RESPONSES TO PHQ QUESTIONS 1-9: 0
SUM OF ALL RESPONSES TO PHQ QUESTIONS 1-9: 0
1. LITTLE INTEREST OR PLEASURE IN DOING THINGS: NOT AT ALL

## 2025-01-25 ASSESSMENT — LIFESTYLE VARIABLES
HOW OFTEN DURING THE LAST YEAR HAVE YOU HAD A FEELING OF GUILT OR REMORSE AFTER DRINKING: NEVER
HOW OFTEN DURING THE LAST YEAR HAVE YOU BEEN UNABLE TO REMEMBER WHAT HAPPENED THE NIGHT BEFORE BECAUSE YOU HAD BEEN DRINKING: NEVER
HOW OFTEN DO YOU HAVE SIX OR MORE DRINKS ON ONE OCCASION: 1
HOW OFTEN DURING THE LAST YEAR HAVE YOU FAILED TO DO WHAT WAS NORMALLY EXPECTED FROM YOU BECAUSE OF DRINKING: NEVER
HOW OFTEN DURING THE LAST YEAR HAVE YOU NEEDED AN ALCOHOLIC DRINK FIRST THING IN THE MORNING TO GET YOURSELF GOING AFTER A NIGHT OF HEAVY DRINKING: NEVER
HOW MANY STANDARD DRINKS CONTAINING ALCOHOL DO YOU HAVE ON A TYPICAL DAY: 1 OR 2
HAS A RELATIVE, FRIEND, DOCTOR, OR ANOTHER HEALTH PROFESSIONAL EXPRESSED CONCERN ABOUT YOUR DRINKING OR SUGGESTED YOU CUT DOWN: NO
HAS A RELATIVE, FRIEND, DOCTOR, OR ANOTHER HEALTH PROFESSIONAL EXPRESSED CONCERN ABOUT YOUR DRINKING OR SUGGESTED YOU CUT DOWN: NO
HOW OFTEN DURING THE LAST YEAR HAVE YOU FAILED TO DO WHAT WAS NORMALLY EXPECTED FROM YOU BECAUSE OF DRINKING: NEVER
HOW OFTEN DURING THE LAST YEAR HAVE YOU HAD A FEELING OF GUILT OR REMORSE AFTER DRINKING: NEVER
HOW MANY STANDARD DRINKS CONTAINING ALCOHOL DO YOU HAVE ON A TYPICAL DAY: 1
HOW OFTEN DURING THE LAST YEAR HAVE YOU FOUND THAT YOU WERE NOT ABLE TO STOP DRINKING ONCE YOU HAD STARTED: NEVER
HOW OFTEN DURING THE LAST YEAR HAVE YOU BEEN UNABLE TO REMEMBER WHAT HAPPENED THE NIGHT BEFORE BECAUSE YOU HAD BEEN DRINKING: NEVER
HOW OFTEN DURING THE LAST YEAR HAVE YOU NEEDED AN ALCOHOLIC DRINK FIRST THING IN THE MORNING TO GET YOURSELF GOING AFTER A NIGHT OF HEAVY DRINKING: NEVER
HOW OFTEN DO YOU HAVE A DRINK CONTAINING ALCOHOL: 4 OR MORE TIMES A WEEK
HOW OFTEN DO YOU HAVE A DRINK CONTAINING ALCOHOL: 5
HAVE YOU OR SOMEONE ELSE BEEN INJURED AS A RESULT OF YOUR DRINKING: NO
HAVE YOU OR SOMEONE ELSE BEEN INJURED AS A RESULT OF YOUR DRINKING: NO
HOW OFTEN DURING THE LAST YEAR HAVE YOU FOUND THAT YOU WERE NOT ABLE TO STOP DRINKING ONCE YOU HAD STARTED: NEVER

## 2025-01-28 ENCOUNTER — OFFICE VISIT (OUTPATIENT)
Dept: INTERNAL MEDICINE CLINIC | Facility: CLINIC | Age: 70
End: 2025-01-28

## 2025-01-28 VITALS
OXYGEN SATURATION: 99 % | RESPIRATION RATE: 12 BRPM | WEIGHT: 144 LBS | SYSTOLIC BLOOD PRESSURE: 112 MMHG | HEART RATE: 54 BPM | BODY MASS INDEX: 24.59 KG/M2 | HEIGHT: 64 IN | DIASTOLIC BLOOD PRESSURE: 82 MMHG | TEMPERATURE: 97.3 F

## 2025-01-28 DIAGNOSIS — I10 ESSENTIAL HYPERTENSION: ICD-10-CM

## 2025-01-28 DIAGNOSIS — F41.9 ANXIETY AND DEPRESSION: ICD-10-CM

## 2025-01-28 DIAGNOSIS — F32.A ANXIETY AND DEPRESSION: ICD-10-CM

## 2025-01-28 DIAGNOSIS — E78.2 MIXED HYPERLIPIDEMIA: ICD-10-CM

## 2025-01-28 DIAGNOSIS — R76.8 POSITIVE ANA (ANTINUCLEAR ANTIBODY): ICD-10-CM

## 2025-01-28 DIAGNOSIS — Z12.31 VISIT FOR SCREENING MAMMOGRAM: ICD-10-CM

## 2025-01-28 DIAGNOSIS — Z15.89 HETEROZYGOUS FOR MTHFR GENE MUTATION: ICD-10-CM

## 2025-01-28 DIAGNOSIS — M85.89 OSTEOPENIA OF MULTIPLE SITES: ICD-10-CM

## 2025-01-28 DIAGNOSIS — R79.9 ABNORMAL BLOOD CHEMISTRY: ICD-10-CM

## 2025-01-28 DIAGNOSIS — D47.2 MGUS (MONOCLONAL GAMMOPATHY OF UNKNOWN SIGNIFICANCE): ICD-10-CM

## 2025-01-28 DIAGNOSIS — Z00.00 MEDICARE ANNUAL WELLNESS VISIT, SUBSEQUENT: Primary | ICD-10-CM

## 2025-01-28 DIAGNOSIS — E55.9 VITAMIN D INSUFFICIENCY: ICD-10-CM

## 2025-01-28 ASSESSMENT — PATIENT HEALTH QUESTIONNAIRE - PHQ9
2. FEELING DOWN, DEPRESSED OR HOPELESS: NOT AT ALL
1. LITTLE INTEREST OR PLEASURE IN DOING THINGS: NOT AT ALL
8. MOVING OR SPEAKING SO SLOWLY THAT OTHER PEOPLE COULD HAVE NOTICED. OR THE OPPOSITE, BEING SO FIGETY OR RESTLESS THAT YOU HAVE BEEN MOVING AROUND A LOT MORE THAN USUAL: NOT AT ALL
3. TROUBLE FALLING OR STAYING ASLEEP: NOT AT ALL
10. IF YOU CHECKED OFF ANY PROBLEMS, HOW DIFFICULT HAVE THESE PROBLEMS MADE IT FOR YOU TO DO YOUR WORK, TAKE CARE OF THINGS AT HOME, OR GET ALONG WITH OTHER PEOPLE: NOT DIFFICULT AT ALL
4. FEELING TIRED OR HAVING LITTLE ENERGY: NOT AT ALL
5. POOR APPETITE OR OVEREATING: NOT AT ALL
SUM OF ALL RESPONSES TO PHQ QUESTIONS 1-9: 0
SUM OF ALL RESPONSES TO PHQ QUESTIONS 1-9: 0
9. THOUGHTS THAT YOU WOULD BE BETTER OFF DEAD, OR OF HURTING YOURSELF: NOT AT ALL
SUM OF ALL RESPONSES TO PHQ9 QUESTIONS 1 & 2: 0
7. TROUBLE CONCENTRATING ON THINGS, SUCH AS READING THE NEWSPAPER OR WATCHING TELEVISION: NOT AT ALL
SUM OF ALL RESPONSES TO PHQ QUESTIONS 1-9: 0
SUM OF ALL RESPONSES TO PHQ QUESTIONS 1-9: 0
6. FEELING BAD ABOUT YOURSELF - OR THAT YOU ARE A FAILURE OR HAVE LET YOURSELF OR YOUR FAMILY DOWN: NOT AT ALL

## 2025-01-28 ASSESSMENT — ENCOUNTER SYMPTOMS
VOMITING: 0
COUGH: 0
SINUS PRESSURE: 0
RHINORRHEA: 0
SHORTNESS OF BREATH: 0
NAUSEA: 0
ABDOMINAL PAIN: 0
BLOOD IN STOOL: 0
DIARRHEA: 0
CHEST TIGHTNESS: 0

## 2025-01-28 NOTE — PROGRESS NOTES
[facility]      2025    Patient Name: Candelaria Marshall  :  1955    Subjective:    Chief Complaint:  Chief Complaint   Patient presents with    Medicare AWV         HPI Here for Medicare Wellness visit; patient had fasting labs done recently and results were reviewed in detail today;   She has hx of MGUS, saw Dr. Kendrick at [facility] on 25; she has hx of DVT, MTHFR mutation and high homocysteine, on Eliquis; additional labs ordered; records reviewed;   She has hx of positive MELISSA, saw Dr. Ford in October; no active synovitis on exam; she is to f/u in 6 months; records reviewed;  She has osteopenia, takes daily calcium and vitamin D, walks 1 mile daily;   She has been going to PT for sciatica, which she found helpful;   Mammogram: 2024  Pap smear: declined further  Bone density: 3/2025, osteopenia  Colonoscopy:   Eye exam:   Dental exam:   Pneumovax 23:   Prevnar 13: 2020  Shingrix:   Tdap: 2016  Fluvax: 2024  RSV: 2023  Moderna Covid 19 booster: 2024  HTN:  Patient compliant with taking blood pressure medications: Yes  Discussed importance of following low sodium DASH diet, increasing physical activity, taking medications as ordered, decreasing alcohol intake, keeping f/u visits to recheck blood pressure, monitoring blood pressure at home and keeping a log, with goal blood pressure <140/90.  Home bp readings are: good      Past Medical History:   Diagnosis Date    Acute DVT (deep venous thrombosis) (HCC)     infrapopliteal peroneal vein.    Allergic rhinitis     Anticoagulant long-term use     Anxiety and depression     Chronic back pain     GERD (gastroesophageal reflux disease)     Hearing loss     I have hearing aids now.    History of bone density study 2023    osteopenia    History of mammogram 2023    HTN (hypertension)     Hyperlipidemia     Menopause     Migraine     Osteoarthritis 2000    Osteopenia     Rash

## 2025-01-30 RX ORDER — FOLIC ACID-PYRIDOXINE-CYANOCOBALAMIN TAB 2.5-25-2 MG 2.5-25-2 MG
1 TAB ORAL DAILY
Qty: 90 TABLET | Refills: 3 | OUTPATIENT
Start: 2025-01-30

## 2025-01-30 RX ORDER — APIXABAN 5 MG/1
5 TABLET, FILM COATED ORAL 2 TIMES DAILY
Qty: 60 TABLET | Refills: 0 | OUTPATIENT
Start: 2025-01-30

## 2025-01-30 RX ORDER — FOLIC ACID-PYRIDOXINE-CYANOCOBALAMIN TAB 2.5-25-2 MG 2.5-25-2 MG
1 TAB ORAL DAILY
Qty: 90 TABLET | Refills: 3 | OUTPATIENT
Start: 2025-01-30 | End: 2026-01-25

## 2025-01-31 ENCOUNTER — PATIENT MESSAGE (OUTPATIENT)
Dept: INTERNAL MEDICINE CLINIC | Facility: CLINIC | Age: 70
End: 2025-01-31

## 2025-01-31 DIAGNOSIS — I82.491 DEEP VEIN THROMBOSIS (DVT) OF OTHER VEIN OF RIGHT LOWER EXTREMITY, UNSPECIFIED CHRONICITY (HCC): ICD-10-CM

## 2025-01-31 DIAGNOSIS — D68.59 PRIMARY HYPERCOAGULABLE STATE (HCC): ICD-10-CM

## 2025-01-31 RX ORDER — FOLIC ACID-PYRIDOXINE-CYANOCOBALAMIN TAB 2.5-25-2 MG 2.5-25-2 MG
1 TAB ORAL DAILY
Qty: 90 TABLET | Refills: 3 | Status: SHIPPED | OUTPATIENT
Start: 2025-01-31 | End: 2026-01-26

## 2025-02-21 ENCOUNTER — PATIENT MESSAGE (OUTPATIENT)
Dept: INTERNAL MEDICINE CLINIC | Facility: CLINIC | Age: 70
End: 2025-02-21

## 2025-02-21 DIAGNOSIS — N95.1 POSTMENOPAUSAL DISORDER: ICD-10-CM

## 2025-02-21 RX ORDER — ESTRADIOL 0.1 MG/G
2 CREAM VAGINAL DAILY
Qty: 42.5 G | Refills: 0 | Status: SHIPPED | OUTPATIENT
Start: 2025-02-21

## 2025-02-21 NOTE — TELEPHONE ENCOUNTER
I called pt at 1523   Per MD Sawyer   Prescription sent to pharmacy, keep follow up appointment;     Pt voiced understanding

## 2025-03-11 DIAGNOSIS — F41.9 ANXIETY: ICD-10-CM

## 2025-03-12 RX ORDER — ESCITALOPRAM OXALATE 20 MG/1
20 TABLET ORAL DAILY
Qty: 90 TABLET | Refills: 3 | Status: SHIPPED | OUTPATIENT
Start: 2025-03-12

## 2025-03-20 DIAGNOSIS — K21.9 GASTROESOPHAGEAL REFLUX DISEASE, UNSPECIFIED WHETHER ESOPHAGITIS PRESENT: ICD-10-CM

## 2025-03-24 RX ORDER — OMEPRAZOLE 20 MG/1
20 CAPSULE, DELAYED RELEASE ORAL DAILY
Qty: 90 CAPSULE | Refills: 3 | Status: SHIPPED | OUTPATIENT
Start: 2025-03-24

## 2025-04-02 DIAGNOSIS — G47.00 INSOMNIA, UNSPECIFIED TYPE: ICD-10-CM

## 2025-04-02 RX ORDER — TRAZODONE HYDROCHLORIDE 50 MG/1
50 TABLET ORAL NIGHTLY
Qty: 90 TABLET | Refills: 3 | OUTPATIENT
Start: 2025-04-02

## 2025-04-28 ENCOUNTER — OFFICE VISIT (OUTPATIENT)
Dept: RHEUMATOLOGY | Age: 70
End: 2025-04-28
Payer: MEDICARE

## 2025-04-28 VITALS
BODY MASS INDEX: 24.69 KG/M2 | HEIGHT: 64 IN | WEIGHT: 144.6 LBS | SYSTOLIC BLOOD PRESSURE: 108 MMHG | OXYGEN SATURATION: 97 % | HEART RATE: 55 BPM | DIASTOLIC BLOOD PRESSURE: 68 MMHG

## 2025-04-28 DIAGNOSIS — M19.042 PRIMARY OSTEOARTHRITIS OF BOTH HANDS: ICD-10-CM

## 2025-04-28 DIAGNOSIS — R76.8 POSITIVE ANA (ANTINUCLEAR ANTIBODY): Primary | ICD-10-CM

## 2025-04-28 DIAGNOSIS — M19.041 PRIMARY OSTEOARTHRITIS OF BOTH HANDS: ICD-10-CM

## 2025-04-28 PROCEDURE — 1159F MED LIST DOCD IN RCRD: CPT | Performed by: INTERNAL MEDICINE

## 2025-04-28 PROCEDURE — G8419 CALC BMI OUT NRM PARAM NOF/U: HCPCS | Performed by: INTERNAL MEDICINE

## 2025-04-28 PROCEDURE — 3074F SYST BP LT 130 MM HG: CPT | Performed by: INTERNAL MEDICINE

## 2025-04-28 PROCEDURE — G2211 COMPLEX E/M VISIT ADD ON: HCPCS | Performed by: INTERNAL MEDICINE

## 2025-04-28 PROCEDURE — G8427 DOCREV CUR MEDS BY ELIG CLIN: HCPCS | Performed by: INTERNAL MEDICINE

## 2025-04-28 PROCEDURE — G8399 PT W/DXA RESULTS DOCUMENT: HCPCS | Performed by: INTERNAL MEDICINE

## 2025-04-28 PROCEDURE — 1090F PRES/ABSN URINE INCON ASSESS: CPT | Performed by: INTERNAL MEDICINE

## 2025-04-28 PROCEDURE — 1123F ACP DISCUSS/DSCN MKR DOCD: CPT | Performed by: INTERNAL MEDICINE

## 2025-04-28 PROCEDURE — 1160F RVW MEDS BY RX/DR IN RCRD: CPT | Performed by: INTERNAL MEDICINE

## 2025-04-28 PROCEDURE — 3017F COLORECTAL CA SCREEN DOC REV: CPT | Performed by: INTERNAL MEDICINE

## 2025-04-28 PROCEDURE — 3078F DIAST BP <80 MM HG: CPT | Performed by: INTERNAL MEDICINE

## 2025-04-28 PROCEDURE — 99214 OFFICE O/P EST MOD 30 MIN: CPT | Performed by: INTERNAL MEDICINE

## 2025-04-28 PROCEDURE — 1036F TOBACCO NON-USER: CPT | Performed by: INTERNAL MEDICINE

## 2025-04-28 ASSESSMENT — JOINT PAIN
TOTAL NUMBER OF SWOLLEN JOINTS: 0
TOTAL NUMBER OF TENDER JOINTS: 2

## 2025-04-28 ASSESSMENT — ROUTINE ASSESSMENT OF PATIENT INDEX DATA (RAPID3)
WHEN YOU AWAKENED IN THE MORNING OVER THE LAST WEEK, PLEASE INDICATE THE AMOUNT OF TIME IT TAKES UNTIL YOU ARE AS LIMBER AS YOU WILL BE FOR THE DAY: < 10 MIN
ON A SCALE OF ONE TO TEN, HOW MUCH OF A PROBLEM HAS UNUSUAL FATIGUE OR TIREDNESS BEEN FOR YOU OVER THE PAST WEEK?: 1
ON A SCALE OF ONE TO TEN, HOW MUCH PAIN HAVE YOU HAD BECAUSE OF YOUR CONDITION OVER THE PAST WEEK?: 6
ON A SCALE OF ONE TO TEN, CONSIDERING ALL THE WAYS IN WHICH ILLNESS AND HEALTH CONDITIONS MAY AFFECT YOU AT THIS TIME, PLEASE INDICATE BELOW HOW YOU ARE DOING:: 6
ON A SCALE OF ONE TO TEN, HOW DIFFICULT WAS IT FOR YOU TO COMPLETE THE LISTED DAILY PHYSICAL TASKS OVER THE LAST WEEK: 0.1

## 2025-04-28 NOTE — PROGRESS NOTES
deformities.     Radiology Reports Reviewed (if available):  Last 3 months     MRI LUMBAR SPINE WO CONTRAST  Narrative: History: Spinal stenosis, lumbosacral region    Exam: MRI lumbar spine without contrast    Technique: Axial and sagittal T1 and T2-weighted sequences are available for  review.  A sagittal STIR sequence is also available for review.    COMPARISON:  None    Findings:    Mild dextroscoliosis. Vertebral body heights are maintained. Negative for  fracture or marrow replacement. Conus medullaris is intact and terminates at L1.  Mild paraspinal muscle atrophy. No paraspinal mass.    L1-L2: No focal disc abnormality, spinal stenosis or foraminal narrowing.    L2-L3: No focal disc abnormality, spinal stenosis or foraminal narrowing.    L3-L4: No focal disc abnormality, spinal stenosis or foraminal narrowing. Mild  bilateral facet arthrosis.    L4-L5: Small right central disc protrusion. Moderate bilateral facet arthrosis.  Ligamentum flavum hypertrophy. Mild spinal stenosis including mild narrowing of  the right lateral recess. Mild right foraminal narrowing.    L5-S1: Broad-based small central disc protrusion. Mild bilateral facet  arthrosis. No significant spinal stenosis or foraminal narrowing.  Impression: 1. Acquired mild spinal stenosis at L4-L5 as above.  2. No significant foraminal narrowing.    Electronically signed by Kaushal Dumont         Lab Reports Reviewed (if available): Last 3 months    No visits with results within 3 Month(s) from this visit.   Latest known visit with results is:   Lab on 01/21/2025   Component Date Value Ref Range Status    Color, UA 01/21/2025 YELLOW/STRAW    Final    Color Reference Range: Straw, Yellow or Dark Yellow    Appearance 01/21/2025 CLEAR    Final    Specific Gravity, UA 01/21/2025 1.021  1.001 - 1.023   Final    pH, Urine 01/21/2025 6.5  5.0 - 9.0   Final    Protein, UA 01/21/2025 Negative  Negative mg/dL Final    Glucose, Ur 01/21/2025 Negative  Negative

## 2025-05-21 ENCOUNTER — HOSPITAL ENCOUNTER (OUTPATIENT)
Dept: MAMMOGRAPHY | Age: 70
Discharge: HOME | End: 2025-05-24
Attending: INTERNAL MEDICINE
Payer: MEDICARE

## 2025-05-21 DIAGNOSIS — Z12.31 VISIT FOR SCREENING MAMMOGRAM: ICD-10-CM

## 2025-05-21 DIAGNOSIS — M85.89 OSTEOPENIA OF MULTIPLE SITES: ICD-10-CM

## 2025-05-21 PROCEDURE — 77080 DXA BONE DENSITY AXIAL: CPT

## 2025-05-21 PROCEDURE — 77063 BREAST TOMOSYNTHESIS BI: CPT

## 2025-05-27 ENCOUNTER — RESULTS FOLLOW-UP (OUTPATIENT)
Dept: INTERNAL MEDICINE CLINIC | Facility: CLINIC | Age: 70
End: 2025-05-27

## 2025-06-02 DIAGNOSIS — G47.00 INSOMNIA, UNSPECIFIED TYPE: ICD-10-CM

## 2025-06-02 RX ORDER — TRAZODONE HYDROCHLORIDE 50 MG/1
TABLET ORAL
Qty: 90 TABLET | Refills: 1 | Status: SHIPPED | OUTPATIENT
Start: 2025-06-02

## 2025-06-02 NOTE — TELEPHONE ENCOUNTER
Medication Refill Request    Name of Medication : Trazodone    Strength of Medication: 50 Mg    Directions: 1 tablet by mouth every night    30 day or 90 day supply: 90    Preferred Pharmacy: [address] Appt. Date: 1/28/25    Next Appt. Date: 1/29/26    Additional Information For Provider: Pt said she had a bottle but she lost it.

## 2025-06-10 DIAGNOSIS — G43.009 MIGRAINE WITHOUT AURA AND WITHOUT STATUS MIGRAINOSUS, NOT INTRACTABLE: ICD-10-CM

## 2025-06-11 DIAGNOSIS — D68.59 PRIMARY HYPERCOAGULABLE STATE: Primary | ICD-10-CM

## 2025-06-11 DIAGNOSIS — I82.491 DEEP VEIN THROMBOSIS (DVT) OF OTHER VEIN OF RIGHT LOWER EXTREMITY, UNSPECIFIED CHRONICITY (HCC): ICD-10-CM

## 2025-06-11 DIAGNOSIS — D47.2 MGUS (MONOCLONAL GAMMOPATHY OF UNKNOWN SIGNIFICANCE): ICD-10-CM

## 2025-06-11 RX ORDER — RIZATRIPTAN BENZOATE 10 MG/1
TABLET, ORALLY DISINTEGRATING ORAL
Qty: 12 TABLET | Refills: 2 | OUTPATIENT
Start: 2025-06-11

## 2025-06-19 ENCOUNTER — OFFICE VISIT (OUTPATIENT)
Dept: ONCOLOGY | Age: 70
End: 2025-06-19

## 2025-06-19 ENCOUNTER — HOSPITAL ENCOUNTER (OUTPATIENT)
Dept: LAB | Age: 70
Discharge: HOME | End: 2025-06-19
Payer: MEDICARE

## 2025-06-19 VITALS
HEIGHT: 65 IN | BODY MASS INDEX: 24.03 KG/M2 | TEMPERATURE: 98.8 F | WEIGHT: 144.2 LBS | OXYGEN SATURATION: 100 % | DIASTOLIC BLOOD PRESSURE: 68 MMHG | RESPIRATION RATE: 18 BRPM | SYSTOLIC BLOOD PRESSURE: 128 MMHG | HEART RATE: 52 BPM

## 2025-06-19 DIAGNOSIS — D68.59 PRIMARY HYPERCOAGULABLE STATE: Primary | ICD-10-CM

## 2025-06-19 DIAGNOSIS — I82.491 DEEP VEIN THROMBOSIS (DVT) OF OTHER VEIN OF RIGHT LOWER EXTREMITY, UNSPECIFIED CHRONICITY (HCC): ICD-10-CM

## 2025-06-19 DIAGNOSIS — D47.2 MGUS (MONOCLONAL GAMMOPATHY OF UNKNOWN SIGNIFICANCE): ICD-10-CM

## 2025-06-19 DIAGNOSIS — D68.59 PRIMARY HYPERCOAGULABLE STATE: ICD-10-CM

## 2025-06-19 LAB
ALBUMIN SERPL-MCNC: 3.5 G/DL (ref 3.2–4.6)
ALBUMIN/GLOB SERPL: 1.2 (ref 1–1.9)
ALP SERPL-CCNC: 54 U/L (ref 35–104)
ALT SERPL-CCNC: 20 U/L (ref 8–45)
ANION GAP SERPL CALC-SCNC: 11 MMOL/L (ref 7–16)
AST SERPL-CCNC: 19 U/L (ref 15–37)
BASOPHILS # BLD: 0.04 K/UL (ref 0–0.2)
BASOPHILS NFR BLD: 0.5 % (ref 0–2)
BILIRUB SERPL-MCNC: 0.3 MG/DL (ref 0–1.2)
BUN SERPL-MCNC: 16 MG/DL (ref 8–23)
CALCIUM SERPL-MCNC: 9.1 MG/DL (ref 8.8–10.2)
CHLORIDE SERPL-SCNC: 101 MMOL/L (ref 98–107)
CO2 SERPL-SCNC: 26 MMOL/L (ref 20–29)
CREAT SERPL-MCNC: 0.7 MG/DL (ref 0.6–1.1)
D DIMER PPP FEU-MCNC: <0.27 UG/ML(FEU)
DIFFERENTIAL METHOD BLD: NORMAL
EOSINOPHIL # BLD: 0.1 K/UL (ref 0–0.8)
EOSINOPHIL NFR BLD: 1.2 % (ref 0.5–7.8)
ERYTHROCYTE [DISTWIDTH] IN BLOOD BY AUTOMATED COUNT: 13.8 % (ref 11.9–14.6)
GLOBULIN SER CALC-MCNC: 2.9 G/DL (ref 2.3–3.5)
GLUCOSE SERPL-MCNC: 130 MG/DL (ref 70–99)
HCT VFR BLD AUTO: 40.3 % (ref 35.8–46.3)
HGB BLD-MCNC: 13.2 G/DL (ref 11.7–15.4)
IMM GRANULOCYTES # BLD AUTO: 0.02 K/UL (ref 0–0.5)
IMM GRANULOCYTES NFR BLD AUTO: 0.2 % (ref 0–5)
KAPPA LC FREE SER-MCNC: 4.9 MG/L (ref 2.4–20.7)
KAPPA LC FREE/LAMBDA FREE SER: 0.2 (ref 0.2–0.8)
LAMBDA LC FREE SERPL-MCNC: 22.6 MG/L (ref 4.2–27.7)
LYMPHOCYTES # BLD: 1.9 K/UL (ref 0.5–4.6)
LYMPHOCYTES NFR BLD: 23.5 % (ref 13–44)
MCH RBC QN AUTO: 32 PG (ref 26.1–32.9)
MCHC RBC AUTO-ENTMCNC: 32.8 G/DL (ref 31.4–35)
MCV RBC AUTO: 97.8 FL (ref 82–102)
MONOCYTES # BLD: 0.69 K/UL (ref 0.1–1.3)
MONOCYTES NFR BLD: 8.5 % (ref 4–12)
NEUTS SEG # BLD: 5.35 K/UL (ref 1.7–8.2)
NEUTS SEG NFR BLD: 66.1 % (ref 43–78)
NRBC # BLD: 0 K/UL (ref 0–0.2)
PLATELET # BLD AUTO: 246 K/UL (ref 150–450)
PMV BLD AUTO: 9.8 FL (ref 9.4–12.3)
POTASSIUM SERPL-SCNC: 3.9 MMOL/L (ref 3.5–5.1)
PROT SERPL-MCNC: 6.4 G/DL (ref 6.3–8.2)
RBC # BLD AUTO: 4.12 M/UL (ref 4.05–5.2)
SODIUM SERPL-SCNC: 138 MMOL/L (ref 136–145)
WBC # BLD AUTO: 8.1 K/UL (ref 4.3–11.1)

## 2025-06-19 PROCEDURE — 85379 FIBRIN DEGRADATION QUANT: CPT

## 2025-06-19 PROCEDURE — 84165 PROTEIN E-PHORESIS SERUM: CPT

## 2025-06-19 PROCEDURE — 80053 COMPREHEN METABOLIC PANEL: CPT

## 2025-06-19 PROCEDURE — 85025 COMPLETE CBC W/AUTO DIFF WBC: CPT

## 2025-06-19 PROCEDURE — 83090 ASSAY OF HOMOCYSTEINE: CPT

## 2025-06-19 PROCEDURE — 83521 IG LIGHT CHAINS FREE EACH: CPT

## 2025-06-19 PROCEDURE — 36415 COLL VENOUS BLD VENIPUNCTURE: CPT

## 2025-06-19 PROCEDURE — 86334 IMMUNOFIX E-PHORESIS SERUM: CPT

## 2025-06-19 PROCEDURE — 82784 ASSAY IGA/IGD/IGG/IGM EACH: CPT

## 2025-06-19 NOTE — PROGRESS NOTES
[place] [address]  Phone: [phone]           6/19/2025  Candelaria Marshall  1955  [id number]        Candelaria Marshall is a 69 year old  female who has returned to my clinic for a follow-up visit; she was initially referred to me by Dr. Alisia Sawyer. In 9/2023 she was found to have a right leg DVT, her partial Hypercoagulable work-up revealed an elevated Factor VIII level and compound heterozygosity for the MTHFR mutation without Hyperhomocysteinemia, she was anti-coagulated with Eliquis for 1 year. In 10/2024 she again developed a right leg DVT and is now on Eliquis 5 mg BID. In 10/2023 she was diagnosed with MGUS, IgG Lambda ( low-risk group ).      REASON FOR CLINIC VISIT:      6 month follow-up and fatigue.        ALLERGIES:    No known drug allergies.        FAMILY HISTORY:    Her daughter had a Pulmonary Embolism.        SOCIAL HISTORY:     She is  and lives with her . She used to work as a . She stopped smoking cigarettes in 1/1991.        PAST MEDICAL HISTORY:    Hypertension, Anxiety Disorder, Iron Deficiency Anemia, GERD, Melanoma, Allergic Rhinitis, Osteoarthritis, Depression, Migraines, Hyperlipidemia, MGUS, Sjogren's Syndrome, Raynaud's phenomenon and an underlying Hypercoagulable Disorder.        ROS:  The patient complained of fatigue and dry mouth; all other systems negative.        PHYSICAL EXAM:   The patient was alert, awake and oriented, no acute distress was noted. Oral examination did not reveal any mucosal lesions. Lymph node examination did not reveal any adenopathy. Neck examination revealed a supple neck, no thyromegaly or masses were noted. Chest examination revealed normal vesicular breath sounds. Heart examination revealed S-1 and S-2 with a 2/6 systolic murmur. Abdominal examination revealed a non-tender abdomen, bowel sounds were positive, no organomegaly could be appreciated. Examination of

## 2025-06-19 NOTE — PATIENT INSTRUCTIONS
Patient Information from Today's Visit    The members of your [facility] are listed below:    Physician Provider: Dr. Ji Cabrera   Advanced Practice Clinician: Yanelis Quiñonez   Registered Nurse: Nicole DEVRIES  Nurse Navigator: N/A  Medical Assistant: Krista LEMA  : Yanelis \"Tsering\" I.   Supportive Care Services: Mavisfransico MARÍAOSMELW    Diagnosis (Information Sheet Provided on Day of Diagnosis):     --DVT / MGUS      Follow Up Instructions:     -Reviewed labs.   -Continue taking your medications as prescribed. We have sent another prescription for your Eliquis to [facility] at your request.   -We will recheck your labs and follow up in 11 months.     Has Treatment Plan Been Finalized? N/A     Current Labs:   Hospital Outpatient Visit on 06/19/2025   Component Date Value Ref Range Status    WBC 06/19/2025 8.1  4.3 - 11.1 K/uL Final    RBC 06/19/2025 4.12  4.05 - 5.2 M/uL Final    Hemoglobin 06/19/2025 13.2  11.7 - 15.4 g/dL Final    Hematocrit 06/19/2025 40.3  35.8 - 46.3 % Final    MCV 06/19/2025 97.8  82.0 - 102.0 FL Final    MCH 06/19/2025 32.0  26.1 - 32.9 PG Final    MCHC 06/19/2025 32.8  31.4 - 35.0 g/dL Final    RDW 06/19/2025 13.8  11.9 - 14.6 % Final    Platelets 06/19/2025 246  150 - 450 K/uL Final    MPV 06/19/2025 9.8  9.4 - 12.3 FL Final    nRBC 06/19/2025 0.00  0.0 - 0.2 K/uL Final    **Note: Absolute NRBC parameter is now reported with Hemogram**    Neutrophils % 06/19/2025 66.1  43.0 - 78.0 % Final    Lymphocytes % 06/19/2025 23.5  13.0 - 44.0 % Final    Monocytes % 06/19/2025 8.5  4.0 - 12.0 % Final    Eosinophils % 06/19/2025 1.2  0.5 - 7.8 % Final    Basophils % 06/19/2025 0.5  0.0 - 2.0 % Final    Immature Granulocytes % 06/19/2025 0.2  0.0 - 5.0 % Final    Neutrophils Absolute 06/19/2025 5.35  1.70 - 8.20 K/UL Final    Lymphocytes Absolute 06/19/2025 1.90  0.50 - 4.60 K/UL Final    Monocytes Absolute 06/19/2025 0.69  0.10 - 1.30 K/UL Final    Eosinophils Absolute 06/19/2025 0.10  0.00 -

## 2025-06-20 LAB — HCYS SERPL-SCNC: 5.4 UMOL/L (ref 0–17.2)

## 2025-06-22 ENCOUNTER — PATIENT MESSAGE (OUTPATIENT)
Dept: INTERNAL MEDICINE CLINIC | Facility: CLINIC | Age: 70
End: 2025-06-22

## 2025-06-22 DIAGNOSIS — G43.009 MIGRAINE WITHOUT AURA AND WITHOUT STATUS MIGRAINOSUS, NOT INTRACTABLE: ICD-10-CM

## 2025-06-23 LAB
ALBUMIN SERPL ELPH-MCNC: 3.4 G/DL (ref 2.9–4.4)
ALBUMIN/GLOB SERPL: 1.3 (ref 0.7–1.7)
ALPHA1 GLOB SERPL ELPH-MCNC: 0.2 G/DL (ref 0–0.4)
ALPHA2 GLOB SERPL ELPH-MCNC: 0.6 G/DL (ref 0.4–1)
B-GLOBULIN SERPL ELPH-MCNC: 1.1 G/DL (ref 0.7–1.3)
GAMMA GLOB SERPL ELPH-MCNC: 0.8 G/DL (ref 0.4–1.8)
GLOBULIN SER-MCNC: 2.7 G/DL (ref 2.2–3.9)
IGA SERPL-MCNC: 201 MG/DL (ref 87–352)
IGG SERPL-MCNC: 863 MG/DL (ref 586–1602)
IGM SERPL-MCNC: 82 MG/DL (ref 26–217)
INTERPRETATION SERPL IEP-IMP: ABNORMAL
M PROTEIN SERPL ELPH-MCNC: 0.3 G/DL
PROT SERPL-MCNC: 6.1 G/DL (ref 6–8.5)

## 2025-06-23 RX ORDER — RIZATRIPTAN BENZOATE 10 MG/1
10 TABLET, ORALLY DISINTEGRATING ORAL
Qty: 12 TABLET | Refills: 2 | Status: SHIPPED | OUTPATIENT
Start: 2025-06-23

## 2025-06-23 NOTE — TELEPHONE ENCOUNTER
Refill request for Rizatriptan sent to Dr Sawyer for review.   Last refill 05/21/2024 #12 with 2 refills  Next apt 01/29/2026

## 2025-07-24 ENCOUNTER — OFFICE VISIT (OUTPATIENT)
Dept: ENT CLINIC | Age: 70
End: 2025-07-24
Payer: MEDICARE

## 2025-07-24 VITALS — WEIGHT: 146 LBS | BODY MASS INDEX: 24.32 KG/M2 | HEIGHT: 65 IN

## 2025-07-24 DIAGNOSIS — H90.3 SENSORINEURAL HEARING LOSS (SNHL) OF BOTH EARS: ICD-10-CM

## 2025-07-24 DIAGNOSIS — H61.23 BILATERAL IMPACTED CERUMEN: ICD-10-CM

## 2025-07-24 DIAGNOSIS — H69.93 DYSFUNCTION OF BOTH EUSTACHIAN TUBES: ICD-10-CM

## 2025-07-24 DIAGNOSIS — J01.90 ACUTE RHINOSINUSITIS: Primary | ICD-10-CM

## 2025-07-24 PROCEDURE — 1126F AMNT PAIN NOTED NONE PRSNT: CPT | Performed by: STUDENT IN AN ORGANIZED HEALTH CARE EDUCATION/TRAINING PROGRAM

## 2025-07-24 PROCEDURE — G8427 DOCREV CUR MEDS BY ELIG CLIN: HCPCS | Performed by: STUDENT IN AN ORGANIZED HEALTH CARE EDUCATION/TRAINING PROGRAM

## 2025-07-24 PROCEDURE — 1123F ACP DISCUSS/DSCN MKR DOCD: CPT | Performed by: STUDENT IN AN ORGANIZED HEALTH CARE EDUCATION/TRAINING PROGRAM

## 2025-07-24 PROCEDURE — 1160F RVW MEDS BY RX/DR IN RCRD: CPT | Performed by: STUDENT IN AN ORGANIZED HEALTH CARE EDUCATION/TRAINING PROGRAM

## 2025-07-24 PROCEDURE — 69210 REMOVE IMPACTED EAR WAX UNI: CPT | Performed by: STUDENT IN AN ORGANIZED HEALTH CARE EDUCATION/TRAINING PROGRAM

## 2025-07-24 PROCEDURE — 1159F MED LIST DOCD IN RCRD: CPT | Performed by: STUDENT IN AN ORGANIZED HEALTH CARE EDUCATION/TRAINING PROGRAM

## 2025-07-24 PROCEDURE — G8399 PT W/DXA RESULTS DOCUMENT: HCPCS | Performed by: STUDENT IN AN ORGANIZED HEALTH CARE EDUCATION/TRAINING PROGRAM

## 2025-07-24 PROCEDURE — G8420 CALC BMI NORM PARAMETERS: HCPCS | Performed by: STUDENT IN AN ORGANIZED HEALTH CARE EDUCATION/TRAINING PROGRAM

## 2025-07-24 PROCEDURE — 99213 OFFICE O/P EST LOW 20 MIN: CPT | Performed by: STUDENT IN AN ORGANIZED HEALTH CARE EDUCATION/TRAINING PROGRAM

## 2025-07-24 PROCEDURE — 1090F PRES/ABSN URINE INCON ASSESS: CPT | Performed by: STUDENT IN AN ORGANIZED HEALTH CARE EDUCATION/TRAINING PROGRAM

## 2025-07-24 PROCEDURE — 3017F COLORECTAL CA SCREEN DOC REV: CPT | Performed by: STUDENT IN AN ORGANIZED HEALTH CARE EDUCATION/TRAINING PROGRAM

## 2025-07-24 PROCEDURE — 1036F TOBACCO NON-USER: CPT | Performed by: STUDENT IN AN ORGANIZED HEALTH CARE EDUCATION/TRAINING PROGRAM

## 2025-07-24 ASSESSMENT — ENCOUNTER SYMPTOMS
GASTROINTESTINAL NEGATIVE: 1
EYES NEGATIVE: 1
RESPIRATORY NEGATIVE: 1
SINUS PAIN: 0
ALLERGIC/IMMUNOLOGIC NEGATIVE: 1
SINUS PRESSURE: 1

## 2025-07-24 NOTE — PROGRESS NOTES
HPI:    Candelaria Marshall is a 69 y.o. female seen Established   Chief Complaint   Patient presents with    Sinus Problem     head is feeling clogged up after having a sinus infection.  Having some sinus pressure.  No discoloration to mucous.  Having pressure and fullness in both ears.  Feels like her hearing is muffled.  Her HA's were just serviced.  Denies pain, drainage, and itching from either ear.      Other     Mentions tooth pain when her sinuses act up.        History of Present Illness  69-year-old female presents for follow-up of sinus infection. History of hearing loss with hearing aids and intermittent eustachian tube dysfunction. Recent sinus infection with unresolved ear and head pressure, fullness, and muffled hearing. Recurrence of eustachian tube dysfunction, contracted infection from granddaughter's  a month ago, no antibiotics taken. Third sinus infection this year, all from granddaughter. Using triamcinolone spray, saline nasal spray, and [employer] allergy pills.        Past Medical History, Past Surgical History, Family history, Social History, and Medications were all reviewed with the patient today and updated as necessary.     Allergies   Allergen Reactions    Codeine Other (See Comments)     Chest pain    Thimerosal (Thiomersal)     Wound Dressing Adhesive Itching       Patient Active Problem List   Diagnosis    Urinary incontinence    Osteoarthritis    Menopause    Essential hypertension    Insomnia    Vertigo    Migraine without aura or status migrainosus    Seasonal allergic rhinitis due to pollen    Osteopenia of multiple sites    Anxiety and depression    GERD (gastroesophageal reflux disease)    Mixed hyperlipidemia    Raynaud's phenomenon    History of DVT (deep vein thrombosis)    Heterozygous for MTHFR gene mutation    MGUS (monoclonal gammopathy of unknown significance)    Positive MELISSA (antinuclear antibody)       Current Outpatient Medications   Medication Sig    rizatriptan

## 2025-08-22 ENCOUNTER — PATIENT MESSAGE (OUTPATIENT)
Dept: INTERNAL MEDICINE CLINIC | Facility: CLINIC | Age: 70
End: 2025-08-22